# Patient Record
Sex: FEMALE | Race: WHITE | Employment: OTHER | URBAN - METROPOLITAN AREA
[De-identification: names, ages, dates, MRNs, and addresses within clinical notes are randomized per-mention and may not be internally consistent; named-entity substitution may affect disease eponyms.]

---

## 2017-02-08 ENCOUNTER — OFFICE VISIT - HEALTHEAST (OUTPATIENT)
Dept: FAMILY MEDICINE | Facility: CLINIC | Age: 82
End: 2017-02-08

## 2017-02-08 DIAGNOSIS — K56.609 SMALL BOWEL OBSTRUCTION (H): ICD-10-CM

## 2017-02-08 DIAGNOSIS — C50.312 MALIGNANT NEOPLASM OF LOWER-INNER QUADRANT OF LEFT FEMALE BREAST (H): ICD-10-CM

## 2017-02-08 DIAGNOSIS — N63.20 LEFT BREAST LUMP: ICD-10-CM

## 2017-02-08 ASSESSMENT — MIFFLIN-ST. JEOR: SCORE: 1055.45

## 2017-02-15 ENCOUNTER — HOSPITAL ENCOUNTER (OUTPATIENT)
Dept: ULTRASOUND IMAGING | Facility: CLINIC | Age: 82
Discharge: HOME | End: 2017-02-15
Attending: FAMILY MEDICINE

## 2017-02-15 ENCOUNTER — HOSPITAL ENCOUNTER (OUTPATIENT)
Dept: MAMMOGRAPHY | Facility: CLINIC | Age: 82
Discharge: HOME | End: 2017-02-15
Attending: FAMILY MEDICINE

## 2017-02-15 ENCOUNTER — COMMUNICATION - HEALTHEAST (OUTPATIENT)
Dept: TELEHEALTH | Facility: CLINIC | Age: 82
End: 2017-02-15

## 2017-02-15 DIAGNOSIS — N63.20 LEFT BREAST LUMP: ICD-10-CM

## 2017-02-21 ENCOUNTER — COMMUNICATION - HEALTHEAST (OUTPATIENT)
Dept: SCHEDULING | Facility: CLINIC | Age: 82
End: 2017-02-21

## 2017-02-21 DIAGNOSIS — I10 ESSENTIAL HYPERTENSION WITH GOAL BLOOD PRESSURE LESS THAN 140/90: ICD-10-CM

## 2017-02-21 DIAGNOSIS — E78.5 DYSLIPIDEMIA: ICD-10-CM

## 2017-02-24 ENCOUNTER — HOSPITAL ENCOUNTER (OUTPATIENT)
Dept: ULTRASOUND IMAGING | Facility: CLINIC | Age: 82
Discharge: HOME | End: 2017-02-24
Attending: FAMILY MEDICINE

## 2017-02-24 DIAGNOSIS — N63.20 BREAST MASS, LEFT: ICD-10-CM

## 2017-02-27 ENCOUNTER — COMMUNICATION - HEALTHEAST (OUTPATIENT)
Dept: ONCOLOGY | Facility: CLINIC | Age: 82
End: 2017-02-27

## 2017-02-28 ENCOUNTER — COMMUNICATION - HEALTHEAST (OUTPATIENT)
Dept: ONCOLOGY | Facility: CLINIC | Age: 82
End: 2017-02-28

## 2017-03-06 ENCOUNTER — COMMUNICATION - HEALTHEAST (OUTPATIENT)
Dept: FAMILY MEDICINE | Facility: CLINIC | Age: 82
End: 2017-03-06

## 2017-03-06 LAB
LAB AP CHARGES (HE HISTORICAL CONVERSION): ABNORMAL
LAB AP IHC ER/PR REPORT,ADDENDUM (HE HISTORICAL CONVERSION): ABNORMAL
PATH REPORT.ADDENDUM SPEC: ABNORMAL
PATH REPORT.COMMENTS IMP SPEC: ABNORMAL
PATH REPORT.COMMENTS IMP SPEC: ABNORMAL
PATH REPORT.FINAL DX SPEC: ABNORMAL
PATH REPORT.GROSS SPEC: ABNORMAL
PATH REPORT.MICROSCOPIC SPEC OTHER STN: ABNORMAL
PATH REPORT.RELEVANT HX SPEC: ABNORMAL
RESULT FLAG (HE HISTORICAL CONVERSION): ABNORMAL

## 2017-03-07 ENCOUNTER — OFFICE VISIT - HEALTHEAST (OUTPATIENT)
Dept: SURGERY | Facility: CLINIC | Age: 82
End: 2017-03-07

## 2017-03-07 DIAGNOSIS — C50.312 MALIGNANT NEOPLASM OF LOWER-INNER QUADRANT OF LEFT FEMALE BREAST (H): ICD-10-CM

## 2017-03-08 ENCOUNTER — OFFICE VISIT - HEALTHEAST (OUTPATIENT)
Dept: FAMILY MEDICINE | Facility: CLINIC | Age: 82
End: 2017-03-08

## 2017-03-08 ENCOUNTER — AMBULATORY - HEALTHEAST (OUTPATIENT)
Dept: SURGERY | Facility: CLINIC | Age: 82
End: 2017-03-08

## 2017-03-08 DIAGNOSIS — C50.312 MALIGNANT NEOPLASM OF LOWER-INNER QUADRANT OF LEFT FEMALE BREAST (H): ICD-10-CM

## 2017-03-08 DIAGNOSIS — Z01.818 PRE-OP EXAM: ICD-10-CM

## 2017-03-08 LAB
ATRIAL RATE - MUSE: 65 BPM
DIASTOLIC BLOOD PRESSURE - MUSE: NORMAL MMHG
INTERPRETATION ECG - MUSE: NORMAL
P AXIS - MUSE: 24 DEGREES
PR INTERVAL - MUSE: 156 MS
QRS DURATION - MUSE: 82 MS
QT - MUSE: 422 MS
QTC - MUSE: 438 MS
R AXIS - MUSE: -11 DEGREES
SYSTOLIC BLOOD PRESSURE - MUSE: NORMAL MMHG
T AXIS - MUSE: 41 DEGREES
VENTRICULAR RATE- MUSE: 65 BPM

## 2017-03-08 ASSESSMENT — MIFFLIN-ST. JEOR: SCORE: 1075.86

## 2017-03-10 ENCOUNTER — AMBULATORY - HEALTHEAST (OUTPATIENT)
Dept: SURGERY | Facility: CLINIC | Age: 82
End: 2017-03-10

## 2017-03-10 ENCOUNTER — RECORDS - HEALTHEAST (OUTPATIENT)
Dept: ADMINISTRATIVE | Facility: OTHER | Age: 82
End: 2017-03-10

## 2017-03-14 ENCOUNTER — RECORDS - HEALTHEAST (OUTPATIENT)
Dept: ADMINISTRATIVE | Facility: OTHER | Age: 82
End: 2017-03-14

## 2017-03-16 ENCOUNTER — OFFICE VISIT - HEALTHEAST (OUTPATIENT)
Dept: SURGERY | Facility: CLINIC | Age: 82
End: 2017-03-16

## 2017-03-16 ENCOUNTER — COMMUNICATION - HEALTHEAST (OUTPATIENT)
Dept: ONCOLOGY | Facility: CLINIC | Age: 82
End: 2017-03-16

## 2017-03-16 DIAGNOSIS — C50.312 MALIGNANT NEOPLASM OF LOWER-INNER QUADRANT OF LEFT FEMALE BREAST (H): ICD-10-CM

## 2017-03-16 ASSESSMENT — MIFFLIN-ST. JEOR: SCORE: 1094

## 2017-03-17 ENCOUNTER — COMMUNICATION - HEALTHEAST (OUTPATIENT)
Dept: ONCOLOGY | Facility: HOSPITAL | Age: 82
End: 2017-03-17

## 2017-03-27 ENCOUNTER — OFFICE VISIT - HEALTHEAST (OUTPATIENT)
Dept: ONCOLOGY | Facility: CLINIC | Age: 82
End: 2017-03-27

## 2017-03-27 DIAGNOSIS — K56.609 SMALL BOWEL OBSTRUCTION (H): ICD-10-CM

## 2017-03-27 DIAGNOSIS — C50.312 MALIGNANT NEOPLASM OF LOWER-INNER QUADRANT OF LEFT FEMALE BREAST (H): ICD-10-CM

## 2017-03-27 DIAGNOSIS — N83.201 RIGHT OVARIAN CYST: ICD-10-CM

## 2017-03-27 ASSESSMENT — MIFFLIN-ST. JEOR: SCORE: 1089.47

## 2017-03-28 ENCOUNTER — COMMUNICATION - HEALTHEAST (OUTPATIENT)
Dept: ONCOLOGY | Facility: CLINIC | Age: 82
End: 2017-03-28

## 2017-04-03 ENCOUNTER — COMMUNICATION - HEALTHEAST (OUTPATIENT)
Dept: ONCOLOGY | Facility: CLINIC | Age: 82
End: 2017-04-03

## 2017-04-06 ENCOUNTER — COMMUNICATION - HEALTHEAST (OUTPATIENT)
Dept: ONCOLOGY | Facility: CLINIC | Age: 82
End: 2017-04-06

## 2017-04-06 ENCOUNTER — COMMUNICATION - HEALTHEAST (OUTPATIENT)
Dept: ADMINISTRATIVE | Facility: HOSPITAL | Age: 82
End: 2017-04-06

## 2017-04-10 ENCOUNTER — COMMUNICATION - HEALTHEAST (OUTPATIENT)
Dept: ONCOLOGY | Facility: CLINIC | Age: 82
End: 2017-04-10

## 2017-04-13 ENCOUNTER — COMMUNICATION - HEALTHEAST (OUTPATIENT)
Dept: FAMILY MEDICINE | Facility: CLINIC | Age: 82
End: 2017-04-13

## 2017-04-13 DIAGNOSIS — N83.201 RIGHT OVARIAN CYST: ICD-10-CM

## 2017-04-19 ENCOUNTER — COMMUNICATION - HEALTHEAST (OUTPATIENT)
Dept: FAMILY MEDICINE | Facility: CLINIC | Age: 82
End: 2017-04-19

## 2017-05-01 ENCOUNTER — COMMUNICATION - HEALTHEAST (OUTPATIENT)
Dept: ONCOLOGY | Facility: CLINIC | Age: 82
End: 2017-05-01

## 2017-05-11 ENCOUNTER — OFFICE VISIT - HEALTHEAST (OUTPATIENT)
Dept: FAMILY MEDICINE | Facility: CLINIC | Age: 82
End: 2017-05-11

## 2017-05-11 DIAGNOSIS — K56.609 SMALL BOWEL OBSTRUCTION (H): ICD-10-CM

## 2017-05-11 DIAGNOSIS — C50.312 MALIGNANT NEOPLASM OF LOWER-INNER QUADRANT OF LEFT FEMALE BREAST (H): ICD-10-CM

## 2017-05-11 DIAGNOSIS — E78.5 DYSLIPIDEMIA: ICD-10-CM

## 2017-05-11 DIAGNOSIS — E87.6 HYPOKALEMIA: ICD-10-CM

## 2017-05-11 DIAGNOSIS — I10 HYPERTENSION, GOAL BELOW 140/90: ICD-10-CM

## 2017-05-11 DIAGNOSIS — E87.1 HYPONATREMIA: ICD-10-CM

## 2017-05-11 DIAGNOSIS — I10 ESSENTIAL HYPERTENSION WITH GOAL BLOOD PRESSURE LESS THAN 140/90: ICD-10-CM

## 2017-05-11 DIAGNOSIS — E83.42 HYPOMAGNESEMIA: ICD-10-CM

## 2017-05-16 ENCOUNTER — COMMUNICATION - HEALTHEAST (OUTPATIENT)
Dept: ONCOLOGY | Facility: CLINIC | Age: 82
End: 2017-05-16

## 2017-05-22 ENCOUNTER — AMBULATORY - HEALTHEAST (OUTPATIENT)
Dept: ONCOLOGY | Facility: CLINIC | Age: 82
End: 2017-05-22

## 2017-05-23 ENCOUNTER — COMMUNICATION - HEALTHEAST (OUTPATIENT)
Dept: FAMILY MEDICINE | Facility: CLINIC | Age: 82
End: 2017-05-23

## 2017-09-13 ENCOUNTER — RECORDS - HEALTHEAST (OUTPATIENT)
Dept: ADMINISTRATIVE | Facility: OTHER | Age: 82
End: 2017-09-13

## 2017-09-19 ENCOUNTER — COMMUNICATION - HEALTHEAST (OUTPATIENT)
Dept: ONCOLOGY | Facility: CLINIC | Age: 82
End: 2017-09-19

## 2017-09-25 ENCOUNTER — COMMUNICATION - HEALTHEAST (OUTPATIENT)
Dept: ONCOLOGY | Facility: CLINIC | Age: 82
End: 2017-09-25

## 2017-10-10 ENCOUNTER — OFFICE VISIT - HEALTHEAST (OUTPATIENT)
Dept: SURGERY | Facility: CLINIC | Age: 82
End: 2017-10-10

## 2017-10-10 DIAGNOSIS — Z85.3 PERSONAL HISTORY OF BREAST CANCER: ICD-10-CM

## 2017-10-24 ENCOUNTER — COMMUNICATION - HEALTHEAST (OUTPATIENT)
Dept: ONCOLOGY | Facility: CLINIC | Age: 82
End: 2017-10-24

## 2018-06-07 ENCOUNTER — TRANSFERRED RECORDS (OUTPATIENT)
Dept: HEALTH INFORMATION MANAGEMENT | Facility: CLINIC | Age: 83
End: 2018-06-07

## 2018-08-21 ASSESSMENT — ENCOUNTER SYMPTOMS
MEMORY LOSS: 0
EYE REDNESS: 0
ABDOMINAL PAIN: 1
CONSTIPATION: 0
DIFFICULTY URINATING: 0
LIGHT-HEADEDNESS: 0
LEG PAIN: 0
DIZZINESS: 0
PALPITATIONS: 0
RECTAL PAIN: 0
BLOOD IN STOOL: 0
HEMATURIA: 0
MUSCLE WEAKNESS: 1
HYPOTENSION: 1
EYE IRRITATION: 0
WEAKNESS: 0
SINUS CONGESTION: 0
EYE PAIN: 0
HYPERTENSION: 1
BLOATING: 0
PARALYSIS: 0
EXERCISE INTOLERANCE: 0
DISTURBANCES IN COORDINATION: 0
NAUSEA: 0
VOMITING: 0
NUMBNESS: 0
BOWEL INCONTINENCE: 0
SLEEP DISTURBANCES DUE TO BREATHING: 0
SORE THROAT: 0
MYALGIAS: 1
ARTHRALGIAS: 1
NECK PAIN: 0
TREMORS: 0
HEARTBURN: 0
SMELL DISTURBANCE: 0
SINUS PAIN: 0
LOSS OF CONSCIOUSNESS: 0
TASTE DISTURBANCE: 0
DOUBLE VISION: 0
MUSCLE CRAMPS: 0
NECK MASS: 0
HOARSE VOICE: 0
DIARRHEA: 0
SPEECH CHANGE: 0
POOR WOUND HEALING: 1
FLANK PAIN: 0
HEADACHES: 0
TINGLING: 0
NAIL CHANGES: 0
BACK PAIN: 1
SKIN CHANGES: 0
DYSURIA: 0
EYE WATERING: 0
SEIZURES: 0
TROUBLE SWALLOWING: 0
JAUNDICE: 0
ORTHOPNEA: 0
STIFFNESS: 0
SYNCOPE: 0
JOINT SWELLING: 0

## 2018-08-23 PROBLEM — N83.291 COMPLEX CYST OF RIGHT OVARY: Status: ACTIVE | Noted: 2018-07-02

## 2018-08-23 PROBLEM — Z15.89 MONOALLELIC MUTATION OF PMS2 GENE: Status: ACTIVE | Noted: 2018-04-17

## 2018-08-23 PROBLEM — Z17.0 ER+ (ESTROGEN RECEPTOR POSITIVE STATUS): Status: ACTIVE | Noted: 2017-07-10

## 2018-08-23 PROBLEM — Z15.09 MONOALLELIC MUTATION OF PMS2 GENE: Status: ACTIVE | Noted: 2018-04-17

## 2018-08-23 PROBLEM — C50.319 MALIGNANT NEOPLASM OF LOWER-INNER QUADRANT OF FEMALE BREAST (H): Status: ACTIVE | Noted: 2017-02-08

## 2018-08-23 PROBLEM — E78.5 DYSLIPIDEMIA: Status: ACTIVE | Noted: 2017-02-22

## 2018-08-23 RX ORDER — VALSARTAN 80 MG/1
80 TABLET ORAL
COMMUNITY
Start: 2018-04-09 | End: 2018-08-24 | Stop reason: ALTCHOICE

## 2018-08-23 RX ORDER — PRAVASTATIN SODIUM 20 MG
20 TABLET ORAL
COMMUNITY
Start: 2018-03-13 | End: 2021-01-01

## 2018-08-23 RX ORDER — DILTIAZEM HYDROCHLORIDE 300 MG/1
300 CAPSULE, COATED, EXTENDED RELEASE ORAL DAILY
COMMUNITY
Start: 2018-03-13

## 2018-08-23 RX ORDER — METOPROLOL SUCCINATE 50 MG/1
50 TABLET, EXTENDED RELEASE ORAL
COMMUNITY
Start: 2018-03-13 | End: 2018-08-24 | Stop reason: ALTCHOICE

## 2018-08-24 ENCOUNTER — ONCOLOGY VISIT (OUTPATIENT)
Dept: ONCOLOGY | Facility: CLINIC | Age: 83
End: 2018-08-24
Attending: OBSTETRICS & GYNECOLOGY
Payer: MEDICARE

## 2018-08-24 ENCOUNTER — PRE VISIT (OUTPATIENT)
Dept: ONCOLOGY | Facility: CLINIC | Age: 83
End: 2018-08-24

## 2018-08-24 VITALS
BODY MASS INDEX: 27.75 KG/M2 | SYSTOLIC BLOOD PRESSURE: 155 MMHG | TEMPERATURE: 98.2 F | WEIGHT: 156.6 LBS | RESPIRATION RATE: 16 BRPM | HEART RATE: 70 BPM | DIASTOLIC BLOOD PRESSURE: 72 MMHG | HEIGHT: 63 IN

## 2018-08-24 DIAGNOSIS — N83.291 COMPLEX CYST OF RIGHT OVARY: Primary | ICD-10-CM

## 2018-08-24 PROCEDURE — G0463 HOSPITAL OUTPT CLINIC VISIT: HCPCS | Mod: ZF

## 2018-08-24 PROCEDURE — 99205 OFFICE O/P NEW HI 60 MIN: CPT | Mod: ZP | Performed by: OBSTETRICS & GYNECOLOGY

## 2018-08-24 RX ORDER — ATENOLOL 50 MG/1
50 TABLET ORAL
COMMUNITY
Start: 2018-08-10 | End: 2021-01-01

## 2018-08-24 RX ORDER — OLMESARTAN MEDOXOMIL 20 MG/1
20 TABLET ORAL
COMMUNITY
Start: 2018-08-10 | End: 2021-01-01 | Stop reason: ALTCHOICE

## 2018-08-24 RX ORDER — DIPHENOXYLATE HYDROCHLORIDE AND ATROPINE SULFATE 2.5; .025 MG/1; MG/1
1 TABLET ORAL
COMMUNITY
End: 2021-01-01

## 2018-08-24 ASSESSMENT — PAIN SCALES - GENERAL: PAINLEVEL: MILD PAIN (3)

## 2018-08-24 NOTE — PROGRESS NOTES
Consult Notes on Referred Patient    Date: 2018       Dr. Loly INTERIANO Yossi  [facility]  [address] [place] [address]       RE: Angelica Toro  : 10/2/1929  LENCHO: 2018    Dear Dr. Loly INTERIANO Yossi:    I had the pleasure of seeing your patient Angelica Toro here at the Gynecologic Cancer Clinic at the [facility] on 2018.  As you know she is a very pleasant 88 year old woman with a known complex right adnexal mass that has been stable over 5yrs (9596-7766), but recently has increased in size and she is now having daily pelvic pain.  Given these findings she was subsequently sent to the Gynecologic Cancer Clinic for new patient consultation.     She reports 3 years of intermittent RLQ pain that can last up to 6mo at a time but can be a few days. It seems to be getting worse over time and rates up 8/10 pain at times. It is worse when sweeping but walking sometimes makes it better and sometimes worse. She doesn't notice any correlation with any other activities. She currently rates it 3/10. She does not feel like it limits her activities currently. She denies changes in bowel or bladder function. Does have stress urinary incontinence but this is unchanged over many years. Has regular BM 1-2/day.    H/o Illness:  Brief history of disease:  2010: US  Uterus measures: 7.6 x 6.3 x 3.5 cm.  Endometrial stripe measures: Obscured by the presence of a mass. Ill-defined borders of an area of mixed echogenicity partly within the myometrium and extending into what appears to be the endometrium measuring 4.7 x 3.7 x 3.1 cm. While this could represent a submucosal fibroid the margins are somewhat ill-defined and other etiologies are not excluded. The second 3.9 cm hypoechoic mass at the uterine fundus most compatible with a fibroid. Free fluid: None. Right ovary: 4.7 x 3.1 x 3.7 cm. Multiple anechoic areas ranging in size from 3 cm to 1.5 cm compatible with multiple  cyst. Left ovary: 1.5 x 1.3 x 0.9 cm.     6/2010: CA-125 8, AFP 2.3, inhibin A: < 1.0, beta hcg 2, CEA 1.2. Endometrial biopsy showed endocervical polyp with fragmented benign endocervical tissue.    8/2010 US:  No interval change in the size of the right ovary cystic lesion. Measures 4.3 x 3.4 x 3.0 cm. Again seen is the septated cystic lesion involving the right ovary. The larger cystic components are not significantly changed since the prior study measuring 3.2 x 3.2 x 2.2 cm, 1.7 x 1.5 x 1.6 cm and 2.1 x 1.8 x 1.9 cm. No definite Doppler flow is demonstrated within the septations. The left ovary is not visualized.     Elected for conservative management until 4/2015 when she had increasing right groin pain. Thought it was secondary to a cardiac endovascular stent that was placed in 2010 through the same area.     6/1/15: CT Abdomen/Pelvis: Right adnexal mass slightly enlarged 3.8 x 4.1 x 6.3cm, known myomatous uterus.     6/12/15: CA-125 7.    6/16/15: Pelvic US: right adnexa 5.3 x 4.4 x 3.6cm with multiple contiguous cysts vs a multilocular cyst.      7/13/15: Referral to Dr Camejo. Asymptomatic from cyst. Elected for observation over surgery given age and h/o MI and stent, with repeat exam in 1yr with general OB/Gyn.    6/7/18: Pelvis US - Normal ovarian tissue is not seen, however there is a septated cyst within the right adnexa measuring 6.3 x 4.8 x 4.7 cm (4.7 x 4.0 x 4.9 cm on a previous outside CT). This has thin internal septation with no calcification. There is likely normal ovarian tissue surrounding the periphery, however this is not for certain.    7/2/18: Saw generalist OB Gyn and expressed desire to discuss again with GynOnc given new daily symptoms.      Review of Systems:  Answers for HPI/ROS submitted by the patient on 8/21/2018   General Symptoms: No  Skin Symptoms: Yes  HENT Symptoms: Yes  EYE SYMPTOMS: Yes  HEART SYMPTOMS: Yes  LUNG SYMPTOMS: No  INTESTINAL SYMPTOMS: Yes  URINARY SYMPTOMS:  Yes  GYNECOLOGIC SYMPTOMS: No  BREAST SYMPTOMS: No  SKELETAL SYMPTOMS: Yes  BLOOD SYMPTOMS: No  NERVOUS SYSTEM SYMPTOMS: Yes  MENTAL HEALTH SYMPTOMS: No  Changes in hair: No  Changes in moles/birth marks: No  Itching: Yes  Rashes: No  Changes in nails: No  Acne: No  Hair in places you don't want it: No  Change in facial hair: No  Warts: No  Non-healing sores: Yes  Scarring: No  Flaking of skin: Yes  Color changes of hands/feet in cold : No  Sun sensitivity: No  Skin thickening: No  Ear pain: No  Ear discharge: No  Hearing loss: No  Tinnitus: No  Nosebleeds: No  Congestion: No  Sinus pain: No  Trouble swallowing: No   Voice hoarseness: No  Mouth sores: No  Sore throat: No  Tooth pain: No  Gum tenderness: Yes  Bleeding gums: Yes  Change in taste: No  Change in sense of smell: No  Dry mouth: No  Hearing aid used: No  Neck lump: No  Eye pain: No  Vision loss: No  Dry eyes: No  Watery eyes: No  Eye bulging: No  Double vision: No  Flashing of lights: No  Spots: No  Floaters: Yes  Redness: No  Crossed eyes: No  Tunnel Vision: No  Yellowing of eyes: No  Eye irritation: No  Chest pain or pressure: No  Fast or irregular heartbeat: No  Pain in legs with walking: No  Trouble breathing while lying down: No  Fingers or toes appear blue: No  High blood pressure: Yes  Low blood pressure: Yes  Fainting: No  Murmurs: No  Pacemaker: No  Varicose veins: Yes  Edema or swelling: No  Wake up at night with shortness of breath: No  Light-headedness: No  Exercise intolerance: No  Heart burn or indigestion: No  Nausea: No  Vomiting: No  Abdominal pain: Yes  Bloating: No  Constipation: No  Diarrhea: No  Blood in stool: No  Black stools: No  Rectal or Anal pain: No  Fecal incontinence: No  Yellowing of skin or eyes: No  Vomit with blood: No  Change in stools: No  Trouble holding urine or incontinence: Yes  Pain or burning: No  Trouble starting or stopping: No  Increased frequency of urination: No  Blood in urine: No  Decreased frequency of  urination: No  Frequent nighttime urination: No  Flank pain: No  Difficulty emptying bladder: No  Back pain: Yes  Muscle aches: Yes  Neck pain: No  Swollen joints: No  Joint pain: Yes  Bone pain: No  Muscle cramps: No  Muscle weakness: Yes  Joint stiffness: No  Bone fracture: No  Trouble with coordination: No  Dizziness or trouble with balance: No  Fainting or black-out spells: No  Memory loss: No  Headache: No  Seizures: No  Speech problems: No  Tingling: No  Tremor: No  Weakness: No  Difficulty walking: No  Paralysis: No  Numbness: No    Past Medical History:  Past Medical History:   Diagnosis Date     Breast cancer (H)     left, s/p lumpectomy     CAD (coronary artery disease)     MI 2010 s/p stent     Diastolic dysfunction      Diverticulosis      HLD (hyperlipidemia)      HTN (hypertension)      Hx SBO      Right ovarian cyst      STEMI (ST elevation myocardial infarction) (H) 10/28/2010    inferior     Urinary incontinence        Past Surgical History:  Past Surgical History:   Procedure Laterality Date     APPENDECTOMY       AS CORNEAL TRANSPLANT,LAMELLAR       CHOLECYSTECTOMY       FRACTURE TX, ANKLE RT/LT       LIGATE VEIN       LUMPECTOMY BREAST Left      VA PATIENT HAS A CORONARY ARTERY STENT  2010      BREAST BIOPSY LT         Health Maintenance:  Health Maintenance Due   Topic Date Due     PHQ-2 Q1 YR  10/02/1941     TETANUS IMMUNIZATION (SYSTEM ASSIGNED)  10/02/1947     FALL RISK ASSESSMENT  10/02/1994     PNEUMOCOCCAL (1 of 2 - PCV13) 10/02/1994     ADVANCE DIRECTIVE PLANNING Q5 YRS  08/11/2016       Last Pap Smear: NO h/o abnormal, last a few years ago    Last Mammogram: 5/22/18              Result: BIRADS 2 benign      She has had a history of abnormal mammograms - breast cancer s/p lumpectomy in 2017, did not tolerate anastrazole.    Last Colonoscopy: 10 years ago, different doctors have told her she does and doesn't need one, will discuss further with PCP                    Last LAVERNE  "Scan: none    Last Diabetes Screening; none    Last Thyroid Screening:     none     Last Cholest Screening:    none       Current Medications:   has a current medication list which includes the following prescription(s): aspirin, atenolol, diltiazem, ferrous gluconate, multi-vitamins, pravastatin, and olmesartan.     Allergies:   Allergies   Allergen Reactions     Amlodipine Cough, Swelling and Other (See Comments)     PN: swollen tongue, irritated throat     Anastrozole Palpitations     High b/p, headache     Lisinopril Anaphylaxis, Shortness Of Breath and Other (See Comments)     Losartan Swelling     PN: severe leg swelling     Sulfa Drugs Swelling and Anaphylaxis     Throat swelling     Thiazide-Type Diuretics Other (See Comments)     Shakes and chills.  Shakes and chills.     Coffee Bean Extract  [Coffea Arabica]      Other reaction(s): Other (See Comments)  PN: decaff coffee     Furosemide Swelling     Hydrochlorothiazide Swelling     Metolazone Other (See Comments)     PN: Trouble breathing and tremors     No Clinical Screening - See Comments Other (See Comments)     PN: decaff coffee       Social History:   Social History   Substance Use Topics     Smoking status: Never Smoker     Smokeless tobacco: Never Used     Alcohol use 0.6 oz/week     1 Cans of beer per week      Comment: 3 times a year       History   Drug Use No     Family History:   No family h/o cancers    Physical Exam:     /72 (BP Location: Right arm, Patient Position: Sitting, Cuff Size: Adult Regular)  Pulse 70  Temp 98.2  F (36.8  C) (Tympanic)  Resp 16  Ht 1.61 m (5' 3.39\")  Wt 71 kg (156 lb 9.6 oz)  BMI 27.4 kg/m2  Body mass index is 27.4 kg/(m^2).    General Appearance: healthy and alert, no distress     HEENT:  no thyromegaly, no palpable nodules or masses        Cardiovascular: regular rate and rhythm, no gallops, rubs or murmurs     Respiratory: lungs clear, no rales, rhonchi or wheezes, normal diaphragmatic " excursion    Musculoskeletal: extremities non tender and without edema    Skin: no lesions or rashes     Neurological: normal gait, no gross defects     Psychiatric: appropriate mood and affect                               Hematological: normal cervical, supraclavicular and inguinal lymph nodes     Gastrointestinal:       abdomen soft, non-tender, non-distended, no organomegaly or masses    Genitourinary: External genitalia and urethral meatus appears normal.  Vagina is smooth without nodularity or masses, atrophic. Cervix appears normal with small areas of abrasion/atrophy and nabothian cyst x2.  Bimanual exam reveal fullness of right adnexa, mobile, no nodularity noted.  Recto-vaginal exam confirms these findings.      Assessment:    Angelica Toro is a 88 year old woman with known right adnexal complex cyst that has increased in size from 2015, though still not worrisome for cancer, is causing daily pain    A total of 60 minutes was spent with the patient, 40 minutes of which were spent in counseling the patient and/or treatment planning.      Plan:     1.)  Discussed risks and benefits of surgery vs observation again. Given increase in symptoms and we are now further removed from her MI and stent in 2010 with stable cardiac function, surgery is a reasonable option. Discussed that this could be done laparoscopically and would recommend she stay overnight for observation given her medical history though if she was highly inclined to go home the same day and was doing well she could. She would like to consider the options of observation vs surgical management and will contact us if she does decide to proceed with surgery.     2.) Genetic risk factors were assessed and the patient does not meet the qualifications for a referral.      3.) Labs and/or tests ordered include: none.     4.) Health maintenance issues addressed today include none.      Thank you for allowing us to participate in the care of your patient.          Sincerely,    Juanjose Camejo    I have examined this patient with our resident who acted as as scribe and agree with the above findings and plan.    Juanjose Camejo I, Keli Patel MD PGY3, acted as scribe for Dr. Camejo.    CC  Patient Care Team:  Loly Means as PCP - General (OB/Gyn)  LOLY MEANS

## 2018-08-24 NOTE — MR AVS SNAPSHOT
"              After Visit Summary   8/24/2018    Angelica Toro    MRN: [id number]           Patient Information     Date Of Birth          10/2/1929        Visit Information        Provider Department      8/24/2018 9:30 AM Juanjose Camejo MD [facility]        Today's Diagnoses     Complex cyst of right ovary    -  1       Follow-ups after your visit        Who to contact     If you have questions or need follow up information about today's clinic visit or your schedule please contact [facility] directly at [phone].  Normal or non-critical lab and imaging results will be communicated to you by [employer], letter or phone within 4 business days after the clinic has received the results. If you do not hear from us within 7 days, please contact the clinic through [employer] or phone. If you have a critical or abnormal lab result, we will notify you by phone as soon as possible.  Submit refill requests through [employer] or call your pharmacy and they will forward the refill request to us. Please allow 3 business days for your refill to be completed.          Additional Information About Your Visit        MyChart Information     [employer] gives you secure access to your electronic health record. If you see a primary care provider, you can also send messages to your care team and make appointments. If you have questions, please call your primary care clinic.  If you do not have a primary care provider, please call [phone] and they will assist you.        Care EveryWhere ID     This is your Care EveryWhere ID. This could be used by other organizations to access your [facility] medical records  [id number]        Your Vitals Were     Pulse Temperature Respirations Height BMI (Body Mass Index)       70 98.2  F (36.8  C) (Tympanic) 16 1.61 m (5' 3.39\") 27.4 kg/m2        Blood Pressure from Last 3 Encounters:   08/24/18 155/72    Weight from Last 3 Encounters:   08/24/18 71 kg " (156 lb 9.6 oz)              Today, you had the following     No orders found for display         Today's Medication Changes          These changes are accurate as of 8/24/18 12:48 PM.  If you have any questions, ask your nurse or doctor.               Stop taking these medicines if you haven't already. Please contact your care team if you have questions.     metoprolol succinate 50 MG 24 hr tablet   Commonly known as:  TOPROL-XL   Stopped by:  Juanjose Camejo MD           valsartan 80 MG tablet   Commonly known as:  DIOVAN   Stopped by:  Juanjose Camejo MD                    Primary Care Provider Office Phone # Fax #    Loly INTERIANO Yossi [phone]       [place] [address] [facility] [address]        Equal Access to Services     [facility] : Blanca chao Soeleanor, waaxda luqadaha, qaybta kaalmada adeegyada, waxay phuc mendoza . So [facility] [phone].    ATENCIÓN: Si habla español, tiene a coleman disposición servicios gratuitos de asistencia lingüística. [facility] [phone].    We comply with applicable federal civil rights laws and [place] laws. We do not discriminate on the basis of race, color, national origin, age, disability, sex, sexual orientation, or gender identity.            Thank you!     Thank you for choosing [facility]  for your care. Our goal is always to provide you with excellent care. Hearing back from our patients is one way we can continue to improve our services. Please take a few minutes to complete the written survey that you may receive in the mail after your visit with us. Thank you!             Your Updated Medication List - Protect others around you: Learn how to safely use, store and throw away your medicines at www.disposemymeds.org.          This list is accurate as of 8/24/18 12:48 PM.  Always use your most recent med list.                   Brand Name Dispense Instructions for use  Diagnosis    aspirin 81 MG EC tablet      Take 81 mg by mouth        atenolol 50 MG tablet    TENORMIN     Take 50 mg by mouth        diltiazem 300 MG 24 hr capsule      Take 300 mg by mouth        Ferrous Gluconate 225 (27 Fe) MG Tabs      225 mg        MULTI-VITAMINS Tabs      Take 1 tablet by mouth        olmesartan 20 MG tablet    BENICAR     Take 20 mg by mouth        pravastatin 20 MG tablet    PRAVACHOL     Take 20 mg by mouth

## 2018-08-24 NOTE — NURSING NOTE
"Oncology Rooming Note    August 24, 2018 9:01 AM   Angelica Toro is a 88 year old female who presents for:    Chief Complaint   Patient presents with     Oncology Clinic Visit     New patient visit related to Enlarged Ovaries     Initial Vitals: /72 (BP Location: Right arm, Patient Position: Sitting, Cuff Size: Adult Regular)  Pulse 70  Temp 98.2  F (36.8  C) (Tympanic)  Resp 16  Ht 1.61 m (5' 3.39\")  Wt 71 kg (156 lb 9.6 oz)  BMI 27.4 kg/m2 Estimated body mass index is 27.4 kg/(m^2) as calculated from the following:    Height as of this encounter: 1.61 m (5' 3.39\").    Weight as of this encounter: 71 kg (156 lb 9.6 oz). Body surface area is 1.78 meters squared.  Mild Pain (3) Comment: Lower right pelvic area   No LMP recorded. Patient is postmenopausal.  Allergies reviewed: Yes  Medications reviewed: Yes    Medications: Medication refills not needed today.  Pharmacy name entered into [facility]: [facility] [place], [place] - [address] [place] [address] [phone]    Clinical concerns: No new concerns. Provider was notified.    10 minutes for nursing intake (face to face time)     Karma Celaya LPN            "

## 2018-08-24 NOTE — LETTER
2018       RE: Angelica Toro  [address] [place] [address]     Dear Colleague,    Thank you for referring your patient, Angelica Toro, to the [facility]. Please see a copy of my visit note below.                            Consult Notes on Referred Patient    Date: 2018       Dr. Loly Sy  [facility]  [address] [place] [address]       RE: Angelica Toro  : 10/2/1929  LENCHO: 2018    Dear Dr. Loly INTERIANO Yossi:    I had the pleasure of seeing your patient Angelica Toro here at the Gynecologic Cancer Clinic at the [facility] on 2018.  As you know she is a very pleasant 88 year old woman with a known complex right adnexal mass that has been stable over 5yrs ([id number]), but recently has increased in size and she is now having daily pelvic pain.  Given these findings she was subsequently sent to the Gynecologic Cancer Clinic for new patient consultation.     She reports 3 years of intermittent RLQ pain that can last up to 6mo at a time but can be a few days. It seems to be getting worse over time and rates up 8/10 pain at times. It is worse when sweeping but walking sometimes makes it better and sometimes worse. She doesn't notice any correlation with any other activities. She currently rates it 3/10. She does not feel like it limits her activities currently. She denies changes in bowel or bladder function. Does have stress urinary incontinence but this is unchanged over many years. Has regular BM 1-2/day.    H/o Illness:  Brief history of disease:  2010: US  Uterus measures: 7.6 x 6.3 x 3.5 cm.  Endometrial stripe measures: Obscured by the presence of a mass. Ill-defined borders of an area of mixed echogenicity partly within the myometrium and extending into what appears to be the endometrium measuring 4.7 x 3.7 x 3.1 cm. While this could represent a submucosal fibroid the margins are somewhat ill-defined and other etiologies are not  excluded. The second 3.9 cm hypoechoic mass at the uterine fundus most compatible with a fibroid. Free fluid: None. Right ovary: 4.7 x 3.1 x 3.7 cm. Multiple anechoic areas ranging in size from 3 cm to 1.5 cm compatible with multiple cyst. Left ovary: 1.5 x 1.3 x 0.9 cm.     6/2010: CA-125 8, AFP 2.3, inhibin A: < 1.0, beta hcg 2, CEA 1.2. Endometrial biopsy showed endocervical polyp with fragmented benign endocervical tissue.    8/2010 US:  No interval change in the size of the right ovary cystic lesion. Measures 4.3 x 3.4 x 3.0 cm. Again seen is the septated cystic lesion involving the right ovary. The larger cystic components are not significantly changed since the prior study measuring 3.2 x 3.2 x 2.2 cm, 1.7 x 1.5 x 1.6 cm and 2.1 x 1.8 x 1.9 cm. No definite Doppler flow is demonstrated within the septations. The left ovary is not visualized.     Elected for conservative management until 4/2015 when she had increasing right groin pain. Thought it was secondary to a cardiac endovascular stent that was placed in 2010 through the same area.     6/1/15: CT Abdomen/Pelvis: Right adnexal mass slightly enlarged 3.8 x 4.1 x 6.3cm, known myomatous uterus.     6/12/15: CA-125 7.    6/16/15: Pelvic US: right adnexa 5.3 x 4.4 x 3.6cm with multiple contiguous cysts vs a multilocular cyst.      7/13/15: Referral to Dr Camejo. Asymptomatic from cyst. Elected for observation over surgery given age and h/o MI and stent, with repeat exam in 1yr with general OB/Gyn.    6/7/18: Pelvis US - Normal ovarian tissue is not seen, however there is a septated cyst within the right adnexa measuring 6.3 x 4.8 x 4.7 cm (4.7 x 4.0 x 4.9 cm on a previous outside CT). This has thin internal septation with no calcification. There is likely normal ovarian tissue surrounding the periphery, however this is not for certain.    7/2/18: Saw generalist OB Gyn and expressed desire to discuss again with GynOnc given new daily symptoms.      Review of  Systems:  Answers for HPI/ROS submitted by the patient on 8/21/2018   General Symptoms: No  Skin Symptoms: Yes  HENT Symptoms: Yes  EYE SYMPTOMS: Yes  HEART SYMPTOMS: Yes  LUNG SYMPTOMS: No  INTESTINAL SYMPTOMS: Yes  URINARY SYMPTOMS: Yes  GYNECOLOGIC SYMPTOMS: No  BREAST SYMPTOMS: No  SKELETAL SYMPTOMS: Yes  BLOOD SYMPTOMS: No  NERVOUS SYSTEM SYMPTOMS: Yes  MENTAL HEALTH SYMPTOMS: No  Changes in hair: No  Changes in moles/birth marks: No  Itching: Yes  Rashes: No  Changes in nails: No  Acne: No  Hair in places you don't want it: No  Change in facial hair: No  Warts: No  Non-healing sores: Yes  Scarring: No  Flaking of skin: Yes  Color changes of hands/feet in cold : No  Sun sensitivity: No  Skin thickening: No  Ear pain: No  Ear discharge: No  Hearing loss: No  Tinnitus: No  Nosebleeds: No  Congestion: No  Sinus pain: No  Trouble swallowing: No   Voice hoarseness: No  Mouth sores: No  Sore throat: No  Tooth pain: No  Gum tenderness: Yes  Bleeding gums: Yes  Change in taste: No  Change in sense of smell: No  Dry mouth: No  Hearing aid used: No  Neck lump: No  Eye pain: No  Vision loss: No  Dry eyes: No  Watery eyes: No  Eye bulging: No  Double vision: No  Flashing of lights: No  Spots: No  Floaters: Yes  Redness: No  Crossed eyes: No  Tunnel Vision: No  Yellowing of eyes: No  Eye irritation: No  Chest pain or pressure: No  Fast or irregular heartbeat: No  Pain in legs with walking: No  Trouble breathing while lying down: No  Fingers or toes appear blue: No  High blood pressure: Yes  Low blood pressure: Yes  Fainting: No  Murmurs: No  Pacemaker: No  Varicose veins: Yes  Edema or swelling: No  Wake up at night with shortness of breath: No  Light-headedness: No  Exercise intolerance: No  Heart burn or indigestion: No  Nausea: No  Vomiting: No  Abdominal pain: Yes  Bloating: No  Constipation: No  Diarrhea: No  Blood in stool: No  Black stools: No  Rectal or Anal pain: No  Fecal incontinence: No  Yellowing of skin or  eyes: No  Vomit with blood: No  Change in stools: No  Trouble holding urine or incontinence: Yes  Pain or burning: No  Trouble starting or stopping: No  Increased frequency of urination: No  Blood in urine: No  Decreased frequency of urination: No  Frequent nighttime urination: No  Flank pain: No  Difficulty emptying bladder: No  Back pain: Yes  Muscle aches: Yes  Neck pain: No  Swollen joints: No  Joint pain: Yes  Bone pain: No  Muscle cramps: No  Muscle weakness: Yes  Joint stiffness: No  Bone fracture: No  Trouble with coordination: No  Dizziness or trouble with balance: No  Fainting or black-out spells: No  Memory loss: No  Headache: No  Seizures: No  Speech problems: No  Tingling: No  Tremor: No  Weakness: No  Difficulty walking: No  Paralysis: No  Numbness: No    Past Medical History:  Past Medical History:   Diagnosis Date     Breast cancer (H)     left, s/p lumpectomy     CAD (coronary artery disease)     MI 2010 s/p stent     Diastolic dysfunction      Diverticulosis      HLD (hyperlipidemia)      HTN (hypertension)      Hx SBO      Right ovarian cyst      STEMI (ST elevation myocardial infarction) (H) 10/28/2010    inferior     Urinary incontinence        Past Surgical History:  Past Surgical History:   Procedure Laterality Date     APPENDECTOMY       AS CORNEAL TRANSPLANT,LAMELLAR       CHOLECYSTECTOMY       FRACTURE TX, ANKLE RT/LT       LIGATE VEIN       LUMPECTOMY BREAST Left      [place] PATIENT HAS A CORONARY ARTERY STENT  2010     US BREAST BIOPSY LT         Health Maintenance:  Health Maintenance Due   Topic Date Due     PHQ-2 Q1 YR  10/02/1941     TETANUS IMMUNIZATION (SYSTEM ASSIGNED)  10/02/1947     FALL RISK ASSESSMENT  10/02/1994     PNEUMOCOCCAL (1 of 2 - PCV13) 10/02/1994     ADVANCE DIRECTIVE PLANNING Q5 YRS  08/11/2016       Last Pap Smear: NO h/o abnormal, last a few years ago    Last Mammogram: 5/22/18              Result: BIRADS 2 benign      She has had a history of abnormal mammograms -  "breast cancer s/p lumpectomy in 2017, did not tolerate anastrazole.    Last Colonoscopy: 10 years ago, different doctors have told her she does and doesn't need one, will discuss further with PCP                    Last DEXA Scan: none    Last Diabetes Screening; none    Last Thyroid Screening:     none     Last Cholest Screening:    none       Current Medications:   has a current medication list which includes the following prescription(s): aspirin, atenolol, diltiazem, ferrous gluconate, multi-vitamins, pravastatin, and olmesartan.     Allergies:   Allergies   Allergen Reactions     Amlodipine Cough, Swelling and Other (See Comments)     PN: swollen tongue, irritated throat     Anastrozole Palpitations     High b/p, headache     Lisinopril Anaphylaxis, Shortness Of Breath and Other (See Comments)     Losartan Swelling     PN: severe leg swelling     Sulfa Drugs Swelling and Anaphylaxis     Throat swelling     Thiazide-Type Diuretics Other (See Comments)     Shakes and chills.  Shakes and chills.     Coffee Bean Extract  [Coffea Arabica]      Other reaction(s): Other (See Comments)  PN: decaff coffee     Furosemide Swelling     Hydrochlorothiazide Swelling     Metolazone Other (See Comments)     PN: Trouble breathing and tremors     No Clinical Screening - See Comments Other (See Comments)     PN: decaff coffee       Social History:   Social History   Substance Use Topics     Smoking status: Never Smoker     Smokeless tobacco: Never Used     Alcohol use 0.6 oz/week     1 Cans of beer per week      Comment: 3 times a year       History   Drug Use No     Family History:   No family h/o cancers    Physical Exam:     /72 (BP Location: Right arm, Patient Position: Sitting, Cuff Size: Adult Regular)  Pulse 70  Temp 98.2  F (36.8  C) (Tympanic)  Resp 16  Ht 1.61 m (5' 3.39\")  Wt 71 kg (156 lb 9.6 oz)  BMI 27.4 kg/m2  Body mass index is 27.4 kg/(m^2).    General Appearance: healthy and alert, no distress   "   HEENT:  no thyromegaly, no palpable nodules or masses        Cardiovascular: regular rate and rhythm, no gallops, rubs or murmurs     Respiratory: lungs clear, no rales, rhonchi or wheezes, normal diaphragmatic excursion    Musculoskeletal: extremities non tender and without edema    Skin: no lesions or rashes     Neurological: normal gait, no gross defects     Psychiatric: appropriate mood and affect                               Hematological: normal cervical, supraclavicular and inguinal lymph nodes     Gastrointestinal:       abdomen soft, non-tender, non-distended, no organomegaly or masses    Genitourinary: External genitalia and urethral meatus appears normal.  Vagina is smooth without nodularity or masses, atrophic. Cervix appears normal with small areas of abrasion/atrophy and nabothian cyst x2.  Bimanual exam reveal fullness of right adnexa, mobile, no nodularity noted.  Recto-vaginal exam confirms these findings.      Assessment:    Angelica Toro is a 88 year old woman with known right adnexal complex cyst that has increased in size from 2015, though still not worrisome for cancer, is causing daily pain    A total of 60 minutes was spent with the patient, 40 minutes of which were spent in counseling the patient and/or treatment planning.      Plan:     1.)  Discussed risks and benefits of surgery vs observation again. Given increase in symptoms and we are now further removed from her MI and stent in 2010 with stable cardiac function, surgery is a reasonable option. Discussed that this could be done laparoscopically and would recommend she stay overnight for observation given her medical history though if she was highly inclined to go home the same day and was doing well she could. She would like to consider the options of observation vs surgical management and will contact us if she does decide to proceed with surgery.     2.) Genetic risk factors were assessed and the patient does not meet the  qualifications for a referral.      3.) Labs and/or tests ordered include: none.     4.) Health maintenance issues addressed today include none.      Thank you for allowing us to participate in the care of your patient.         Sincerely,    Juanjose Camejo    I have examined this patient with our resident who acted as as scribe and agree with the above findings and plan.    Juanjose Camejo I, Keli Patel MD PGY3, acted as scribe for Dr. Camejo.    CC  Patient Care Team:  Loly Sy as PCP - General (OB/Gyn)

## 2019-09-30 ENCOUNTER — HEALTH MAINTENANCE LETTER (OUTPATIENT)
Age: 84
End: 2019-09-30

## 2019-12-15 ENCOUNTER — HEALTH MAINTENANCE LETTER (OUTPATIENT)
Age: 84
End: 2019-12-15

## 2020-01-01 ENCOUNTER — TELEPHONE (OUTPATIENT)
Dept: ONCOLOGY | Facility: CLINIC | Age: 85
End: 2020-01-01

## 2020-11-24 NOTE — TELEPHONE ENCOUNTER
Returning Angelica's phone call, sounds like she had a repeat CT scan through [facility] that shows her right ovarian cyst from 2018 has increased in size now measuring 8 cm and she has 4.6 cm cyst on her left ovary.  Patient having intermittent discomfort and constipation along with more urinary incontinence.  She would like a follow up with Dr. Camejo when her daughters would be around to help her after middle of Jan 2021, so have her set up 2/1/2021.

## 2021-01-01 ENCOUNTER — HOSPITAL ENCOUNTER (INPATIENT)
Dept: CARDIOLOGY | Facility: CLINIC | Age: 86
DRG: 981 | End: 2021-08-07
Attending: INTERNAL MEDICINE
Payer: MEDICARE

## 2021-01-01 ENCOUNTER — APPOINTMENT (OUTPATIENT)
Dept: OCCUPATIONAL THERAPY | Facility: CLINIC | Age: 86
DRG: 981 | End: 2021-01-01
Payer: MEDICARE

## 2021-01-01 ENCOUNTER — AMBULATORY - HEALTHEAST (OUTPATIENT)
Dept: OTHER | Facility: CLINIC | Age: 86
End: 2021-01-01

## 2021-01-01 ENCOUNTER — APPOINTMENT (OUTPATIENT)
Dept: CT IMAGING | Facility: CLINIC | Age: 86
DRG: 981 | End: 2021-01-01
Attending: FAMILY MEDICINE
Payer: MEDICARE

## 2021-01-01 ENCOUNTER — HOSPITAL ENCOUNTER (INPATIENT)
Facility: HOSPICE | Age: 86
LOS: 6 days | End: 2021-10-18

## 2021-01-01 ENCOUNTER — LAB REQUISITION (OUTPATIENT)
Dept: LAB | Facility: CLINIC | Age: 86
End: 2021-01-01
Payer: MEDICARE

## 2021-01-01 ENCOUNTER — APPOINTMENT (OUTPATIENT)
Dept: CT IMAGING | Facility: CLINIC | Age: 86
DRG: 981 | End: 2021-01-01
Attending: HOSPITALIST
Payer: MEDICARE

## 2021-01-01 ENCOUNTER — APPOINTMENT (OUTPATIENT)
Dept: PHYSICAL THERAPY | Facility: CLINIC | Age: 86
DRG: 981 | End: 2021-01-01
Payer: MEDICARE

## 2021-01-01 ENCOUNTER — HOSPITAL ENCOUNTER (OUTPATIENT)
Facility: CLINIC | Age: 86
Discharge: HOME | End: 2021-03-27
Attending: OBSTETRICS & GYNECOLOGY | Admitting: OBSTETRICS & GYNECOLOGY
Payer: MEDICARE

## 2021-01-01 ENCOUNTER — APPOINTMENT (OUTPATIENT)
Dept: OCCUPATIONAL THERAPY | Facility: CLINIC | Age: 86
DRG: 981 | End: 2021-01-01
Attending: HOSPITALIST
Payer: MEDICARE

## 2021-01-01 ENCOUNTER — ONCOLOGY VISIT (OUTPATIENT)
Dept: ONCOLOGY | Facility: CLINIC | Age: 86
End: 2021-01-01
Attending: OBSTETRICS & GYNECOLOGY
Payer: MEDICARE

## 2021-01-01 ENCOUNTER — APPOINTMENT (OUTPATIENT)
Dept: RADIOLOGY | Facility: CLINIC | Age: 86
DRG: 981 | End: 2021-01-01
Attending: SURGERY
Payer: MEDICARE

## 2021-01-01 ENCOUNTER — HEALTH MAINTENANCE LETTER (OUTPATIENT)
Age: 86
End: 2021-01-01

## 2021-01-01 ENCOUNTER — MEDICAL CORRESPONDENCE (OUTPATIENT)
Dept: HEALTH INFORMATION MANAGEMENT | Facility: CLINIC | Age: 86
End: 2021-01-01

## 2021-01-01 ENCOUNTER — ANESTHESIA (OUTPATIENT)
Dept: SURGERY | Facility: CLINIC | Age: 86
End: 2021-01-01
Payer: MEDICARE

## 2021-01-01 ENCOUNTER — DOCUMENTATION ONLY (OUTPATIENT)
Dept: ONCOLOGY | Facility: CLINIC | Age: 86
End: 2021-01-01

## 2021-01-01 ENCOUNTER — APPOINTMENT (OUTPATIENT)
Dept: CT IMAGING | Facility: CLINIC | Age: 86
DRG: 981 | End: 2021-01-01
Attending: SPECIALIST
Payer: MEDICARE

## 2021-01-01 ENCOUNTER — DOCUMENTATION ONLY (OUTPATIENT)
Dept: OTHER | Facility: CLINIC | Age: 86
End: 2021-01-01

## 2021-01-01 ENCOUNTER — ANESTHESIA EVENT (OUTPATIENT)
Dept: SURGERY | Facility: CLINIC | Age: 86
End: 2021-01-01
Payer: MEDICARE

## 2021-01-01 ENCOUNTER — APPOINTMENT (OUTPATIENT)
Dept: CT IMAGING | Facility: CLINIC | Age: 86
DRG: 981 | End: 2021-01-01
Attending: PHYSICIAN ASSISTANT
Payer: MEDICARE

## 2021-01-01 ENCOUNTER — PATIENT OUTREACH (OUTPATIENT)
Dept: ONCOLOGY | Facility: CLINIC | Age: 86
End: 2021-01-01

## 2021-01-01 ENCOUNTER — HOSPITAL ENCOUNTER (OUTPATIENT)
Dept: CT IMAGING | Facility: HOSPITAL | Age: 86
End: 2021-09-03
Attending: NURSE PRACTITIONER
Payer: MEDICARE

## 2021-01-01 ENCOUNTER — TELEPHONE (OUTPATIENT)
Dept: ONCOLOGY | Facility: CLINIC | Age: 86
End: 2021-01-01

## 2021-01-01 ENCOUNTER — MEDICAL CORRESPONDENCE (OUTPATIENT)
Dept: HEALTH INFORMATION MANAGEMENT | Facility: CLINIC | Age: 86
End: 2021-01-01
Payer: MEDICARE

## 2021-01-01 ENCOUNTER — ANCILLARY PROCEDURE (OUTPATIENT)
Dept: ULTRASOUND IMAGING | Facility: CLINIC | Age: 86
End: 2021-01-01
Payer: MEDICARE

## 2021-01-01 ENCOUNTER — APPOINTMENT (OUTPATIENT)
Dept: PHYSICAL THERAPY | Facility: CLINIC | Age: 86
DRG: 981 | End: 2021-01-01
Attending: HOSPITALIST
Payer: MEDICARE

## 2021-01-01 ENCOUNTER — APPOINTMENT (OUTPATIENT)
Dept: CT IMAGING | Facility: CLINIC | Age: 86
DRG: 981 | End: 2021-01-01
Attending: NURSE PRACTITIONER
Payer: MEDICARE

## 2021-01-01 ENCOUNTER — HOSPITAL ENCOUNTER (INPATIENT)
Facility: CLINIC | Age: 86
LOS: 13 days | Discharge: HOME | DRG: 981 | End: 2021-08-19
Attending: EMERGENCY MEDICINE | Admitting: HOSPITALIST
Payer: MEDICARE

## 2021-01-01 ENCOUNTER — APPOINTMENT (OUTPATIENT)
Dept: RADIOLOGY | Facility: CLINIC | Age: 86
DRG: 981 | End: 2021-01-01
Attending: HOSPITALIST
Payer: MEDICARE

## 2021-01-01 ENCOUNTER — HOSPITAL ENCOUNTER (OUTPATIENT)
Dept: INTERVENTIONAL RADIOLOGY/VASCULAR | Facility: HOSPITAL | Age: 86
End: 2021-09-03
Attending: NURSE PRACTITIONER
Payer: MEDICARE

## 2021-01-01 VITALS
DIASTOLIC BLOOD PRESSURE: 57 MMHG | BODY MASS INDEX: 24.18 KG/M2 | HEIGHT: 63 IN | SYSTOLIC BLOOD PRESSURE: 122 MMHG | TEMPERATURE: 98.4 F | RESPIRATION RATE: 17 BRPM | OXYGEN SATURATION: 94 % | WEIGHT: 136.47 LBS | HEART RATE: 67 BPM

## 2021-01-01 VITALS — HEIGHT: 64 IN | BODY MASS INDEX: 26.12 KG/M2 | WEIGHT: 153 LBS

## 2021-01-01 VITALS
WEIGHT: 127.1 LBS | DIASTOLIC BLOOD PRESSURE: 64 MMHG | HEART RATE: 90 BPM | TEMPERATURE: 98.1 F | OXYGEN SATURATION: 93 % | SYSTOLIC BLOOD PRESSURE: 139 MMHG | HEIGHT: 62 IN | RESPIRATION RATE: 18 BRPM | BODY MASS INDEX: 23.39 KG/M2

## 2021-01-01 VITALS
WEIGHT: 134 LBS | DIASTOLIC BLOOD PRESSURE: 66 MMHG | TEMPERATURE: 98.2 F | OXYGEN SATURATION: 97 % | BODY MASS INDEX: 23.74 KG/M2 | HEART RATE: 83 BPM | SYSTOLIC BLOOD PRESSURE: 166 MMHG

## 2021-01-01 VITALS
TEMPERATURE: 98 F | DIASTOLIC BLOOD PRESSURE: 83 MMHG | RESPIRATION RATE: 18 BRPM | BODY MASS INDEX: 24.03 KG/M2 | SYSTOLIC BLOOD PRESSURE: 184 MMHG | HEART RATE: 77 BPM | OXYGEN SATURATION: 98 % | WEIGHT: 137.3 LBS

## 2021-01-01 VITALS — HEIGHT: 64 IN | WEIGHT: 149 LBS | BODY MASS INDEX: 25.44 KG/M2

## 2021-01-01 VITALS — WEIGHT: 148 LBS | BODY MASS INDEX: 26.22 KG/M2 | HEIGHT: 63 IN

## 2021-01-01 VITALS — BODY MASS INDEX: 26.43 KG/M2 | WEIGHT: 154 LBS

## 2021-01-01 VITALS
OXYGEN SATURATION: 94 % | RESPIRATION RATE: 20 BRPM | SYSTOLIC BLOOD PRESSURE: 132 MMHG | HEIGHT: 62 IN | WEIGHT: 127.87 LBS | TEMPERATURE: 96.8 F | HEART RATE: 80 BPM | BODY MASS INDEX: 23.53 KG/M2 | DIASTOLIC BLOOD PRESSURE: 92 MMHG

## 2021-01-01 VITALS — BODY MASS INDEX: 26.93 KG/M2 | WEIGHT: 152 LBS

## 2021-01-01 VITALS — HEIGHT: 64 IN | WEIGHT: 152 LBS | BODY MASS INDEX: 25.95 KG/M2

## 2021-01-01 VITALS — BODY MASS INDEX: 26.26 KG/M2 | WEIGHT: 153 LBS

## 2021-01-01 DIAGNOSIS — N30.00 ACUTE CYSTITIS WITHOUT HEMATURIA: ICD-10-CM

## 2021-01-01 DIAGNOSIS — D50.9 IRON DEFICIENCY ANEMIA, UNSPECIFIED: ICD-10-CM

## 2021-01-01 DIAGNOSIS — Z48.03 CHANGE OR REMOVAL OF DRAINS: ICD-10-CM

## 2021-01-01 DIAGNOSIS — N73.9 PELVIC ABSCESS IN FEMALE: Primary | ICD-10-CM

## 2021-01-01 DIAGNOSIS — R19.00 PELVIC MASS: ICD-10-CM

## 2021-01-01 DIAGNOSIS — E87.6 HYPOKALEMIA: ICD-10-CM

## 2021-01-01 DIAGNOSIS — R19.7 DIARRHEA, UNSPECIFIED: ICD-10-CM

## 2021-01-01 DIAGNOSIS — R79.89 OTHER SPECIFIED ABNORMAL FINDINGS OF BLOOD CHEMISTRY: ICD-10-CM

## 2021-01-01 DIAGNOSIS — I48.91 ATRIAL FIBRILLATION WITH RAPID VENTRICULAR RESPONSE (H): ICD-10-CM

## 2021-01-01 DIAGNOSIS — D72.829 ELEVATED WHITE BLOOD CELL COUNT, UNSPECIFIED: ICD-10-CM

## 2021-01-01 DIAGNOSIS — I50.30 UNSPECIFIED DIASTOLIC (CONGESTIVE) HEART FAILURE (H): ICD-10-CM

## 2021-01-01 DIAGNOSIS — Z48.03 CHANGE OR REMOVAL OF DRAINS: Primary | ICD-10-CM

## 2021-01-01 DIAGNOSIS — L25.8 INCONTINENCE ASSOCIATED DERMATITIS: ICD-10-CM

## 2021-01-01 DIAGNOSIS — Z01.818 PREOP GENERAL PHYSICAL EXAM: ICD-10-CM

## 2021-01-01 DIAGNOSIS — A04.72 ENTEROCOLITIS DUE TO CLOSTRIDIUM DIFFICILE, NOT SPECIFIED AS RECURRENT: ICD-10-CM

## 2021-01-01 DIAGNOSIS — Z11.59 ENCOUNTER FOR SCREENING FOR OTHER VIRAL DISEASES: Primary | ICD-10-CM

## 2021-01-01 DIAGNOSIS — Z98.890 S/P LAPAROSCOPY: Primary | ICD-10-CM

## 2021-01-01 DIAGNOSIS — R19.00 PELVIC MASS: Primary | ICD-10-CM

## 2021-01-01 DIAGNOSIS — R32 INCONTINENCE ASSOCIATED DERMATITIS: ICD-10-CM

## 2021-01-01 DIAGNOSIS — R68.83 CHILLS: ICD-10-CM

## 2021-01-01 LAB
ABO + RH BLD: NORMAL
ABO + RH BLD: NORMAL
ALBUMIN SERPL-MCNC: 1.4 G/DL (ref 3.5–5)
ALBUMIN SERPL-MCNC: 2.4 G/DL (ref 3.5–5)
ALBUMIN UR-MCNC: 10 MG/DL
ALP SERPL-CCNC: 126 U/L (ref 45–120)
ALP SERPL-CCNC: 81 U/L (ref 45–120)
ALT SERPL W P-5'-P-CCNC: 21 U/L (ref 0–45)
ALT SERPL W P-5'-P-CCNC: 26 U/L (ref 0–45)
ANION GAP SERPL CALCULATED.3IONS-SCNC: 10 MMOL/L (ref 5–18)
ANION GAP SERPL CALCULATED.3IONS-SCNC: 12 MMOL/L (ref 5–18)
ANION GAP SERPL CALCULATED.3IONS-SCNC: 5 MMOL/L (ref 5–18)
ANION GAP SERPL CALCULATED.3IONS-SCNC: 6 MMOL/L (ref 5–18)
ANION GAP SERPL CALCULATED.3IONS-SCNC: 7 MMOL/L (ref 5–18)
ANION GAP SERPL CALCULATED.3IONS-SCNC: 8 MMOL/L (ref 5–18)
APPEARANCE UR: ABNORMAL
AST SERPL W P-5'-P-CCNC: 22 U/L (ref 0–40)
AST SERPL W P-5'-P-CCNC: 24 U/L (ref 0–40)
BACTERIA #/AREA URNS HPF: ABNORMAL /HPF
BACTERIA ABSC ANAEROBE+AEROBE CULT: ABNORMAL
BACTERIA ABSC ANAEROBE+AEROBE CULT: NORMAL
BACTERIA BLD CULT: NO GROWTH
BACTERIA UR CULT: ABNORMAL
BASOPHILS # BLD AUTO: 0 10E3/UL (ref 0–0.2)
BASOPHILS # BLD AUTO: 0.1 10E3/UL (ref 0–0.2)
BASOPHILS NFR BLD AUTO: 0 %
BASOPHILS NFR BLD AUTO: 1 %
BILIRUB SERPL-MCNC: 0.6 MG/DL (ref 0–1)
BILIRUB SERPL-MCNC: 0.7 MG/DL (ref 0–1)
BILIRUB UR QL STRIP: NEGATIVE
BLD GP AB SCN SERPL QL: NORMAL
BLOOD BANK CMNT PATIENT-IMP: NORMAL
BUN SERPL-MCNC: 10 MG/DL (ref 8–28)
BUN SERPL-MCNC: 10 MG/DL (ref 8–28)
BUN SERPL-MCNC: 12 MG/DL (ref 8–28)
BUN SERPL-MCNC: 14 MG/DL (ref 8–28)
BUN SERPL-MCNC: 15 MG/DL (ref 8–28)
BUN SERPL-MCNC: 17 MG/DL (ref 8–28)
BUN SERPL-MCNC: 19 MG/DL (ref 8–28)
BUN SERPL-MCNC: 26 MG/DL (ref 8–28)
BUN SERPL-MCNC: 27 MG/DL (ref 8–28)
BUN SERPL-MCNC: 7 MG/DL (ref 8–28)
BUN SERPL-MCNC: 8 MG/DL (ref 8–28)
BUN SERPL-MCNC: 8 MG/DL (ref 8–28)
BUN SERPL-MCNC: 9 MG/DL (ref 8–28)
BUN SERPL-MCNC: 9 MG/DL (ref 8–28)
C DIFF TOX B STL QL: POSITIVE
CALCIUM SERPL-MCNC: 7.6 MG/DL (ref 8.5–10.5)
CALCIUM SERPL-MCNC: 7.7 MG/DL (ref 8.5–10.5)
CALCIUM SERPL-MCNC: 7.8 MG/DL (ref 8.5–10.5)
CALCIUM SERPL-MCNC: 7.9 MG/DL (ref 8.5–10.5)
CALCIUM SERPL-MCNC: 8 MG/DL (ref 8.5–10.5)
CALCIUM SERPL-MCNC: 8 MG/DL (ref 8.5–10.5)
CALCIUM SERPL-MCNC: 8.1 MG/DL (ref 8.5–10.5)
CALCIUM SERPL-MCNC: 8.1 MG/DL (ref 8.5–10.5)
CALCIUM SERPL-MCNC: 8.2 MG/DL (ref 8.5–10.5)
CALCIUM SERPL-MCNC: 8.6 MG/DL (ref 8.5–10.5)
CALCIUM SERPL-MCNC: 8.6 MG/DL (ref 8.5–10.5)
CALCIUM SERPL-MCNC: 8.8 MG/DL (ref 8.5–10.5)
CHLORIDE BLD-SCNC: 100 MMOL/L (ref 98–107)
CHLORIDE BLD-SCNC: 102 MMOL/L (ref 98–107)
CHLORIDE BLD-SCNC: 103 MMOL/L (ref 98–107)
CHLORIDE BLD-SCNC: 105 MMOL/L (ref 98–107)
CHLORIDE BLD-SCNC: 91 MMOL/L (ref 98–107)
CHLORIDE BLD-SCNC: 93 MMOL/L (ref 98–107)
CHLORIDE BLD-SCNC: 95 MMOL/L (ref 98–107)
CHLORIDE BLD-SCNC: 95 MMOL/L (ref 98–107)
CHLORIDE BLD-SCNC: 96 MMOL/L (ref 98–107)
CHLORIDE BLD-SCNC: 96 MMOL/L (ref 98–107)
CHLORIDE BLD-SCNC: 97 MMOL/L (ref 98–107)
CHLORIDE BLD-SCNC: 98 MMOL/L (ref 98–107)
CHLORIDE BLD-SCNC: 99 MMOL/L (ref 98–107)
CHLORIDE BLD-SCNC: 99 MMOL/L (ref 98–107)
CO2 SERPL-SCNC: 20 MMOL/L (ref 22–31)
CO2 SERPL-SCNC: 20 MMOL/L (ref 22–31)
CO2 SERPL-SCNC: 21 MMOL/L (ref 22–31)
CO2 SERPL-SCNC: 22 MMOL/L (ref 22–31)
CO2 SERPL-SCNC: 22 MMOL/L (ref 22–31)
CO2 SERPL-SCNC: 23 MMOL/L (ref 22–31)
CO2 SERPL-SCNC: 24 MMOL/L (ref 22–31)
CO2 SERPL-SCNC: 25 MMOL/L (ref 22–31)
CO2 SERPL-SCNC: 26 MMOL/L (ref 22–31)
CO2 SERPL-SCNC: 28 MMOL/L (ref 22–31)
CO2 SERPL-SCNC: 31 MMOL/L (ref 22–31)
COLOR UR AUTO: YELLOW
COPATH REPORT: NORMAL
CREAT SERPL-MCNC: 0.46 MG/DL (ref 0.6–1.1)
CREAT SERPL-MCNC: 0.49 MG/DL (ref 0.6–1.1)
CREAT SERPL-MCNC: 0.5 MG/DL (ref 0.6–1.1)
CREAT SERPL-MCNC: 0.52 MG/DL (ref 0.6–1.1)
CREAT SERPL-MCNC: 0.52 MG/DL (ref 0.6–1.1)
CREAT SERPL-MCNC: 0.55 MG/DL (ref 0.6–1.1)
CREAT SERPL-MCNC: 0.56 MG/DL (ref 0.6–1.1)
CREAT SERPL-MCNC: 0.57 MG/DL (ref 0.6–1.1)
CREAT SERPL-MCNC: 0.58 MG/DL (ref 0.6–1.1)
CREAT SERPL-MCNC: 0.59 MG/DL (ref 0.6–1.1)
CREAT SERPL-MCNC: 0.62 MG/DL (ref 0.6–1.1)
CREAT SERPL-MCNC: 0.64 MG/DL (ref 0.6–1.1)
CREAT SERPL-MCNC: 0.64 MG/DL (ref 0.6–1.1)
CREAT SERPL-MCNC: 0.66 MG/DL (ref 0.6–1.1)
CREAT SERPL-MCNC: 1.02 MG/DL (ref 0.6–1.1)
EOSINOPHIL # BLD AUTO: 0 10E3/UL (ref 0–0.7)
EOSINOPHIL # BLD AUTO: 0 10E3/UL (ref 0–0.7)
EOSINOPHIL NFR BLD AUTO: 0 %
EOSINOPHIL NFR BLD AUTO: 0 %
ERYTHROCYTE [DISTWIDTH] IN BLOOD BY AUTOMATED COUNT: 13.5 % (ref 10–15)
ERYTHROCYTE [DISTWIDTH] IN BLOOD BY AUTOMATED COUNT: 13.7 % (ref 10–15)
ERYTHROCYTE [DISTWIDTH] IN BLOOD BY AUTOMATED COUNT: 14.1 % (ref 10–15)
ERYTHROCYTE [DISTWIDTH] IN BLOOD BY AUTOMATED COUNT: 14.4 % (ref 10–15)
ERYTHROCYTE [DISTWIDTH] IN BLOOD BY AUTOMATED COUNT: 14.5 % (ref 10–15)
ERYTHROCYTE [DISTWIDTH] IN BLOOD BY AUTOMATED COUNT: 14.5 % (ref 10–15)
ERYTHROCYTE [DISTWIDTH] IN BLOOD BY AUTOMATED COUNT: 14.6 % (ref 10–15)
ERYTHROCYTE [DISTWIDTH] IN BLOOD BY AUTOMATED COUNT: 14.6 % (ref 10–15)
ERYTHROCYTE [DISTWIDTH] IN BLOOD BY AUTOMATED COUNT: 14.8 % (ref 10–15)
ERYTHROCYTE [DISTWIDTH] IN BLOOD BY AUTOMATED COUNT: 14.8 % (ref 10–15)
ERYTHROCYTE [DISTWIDTH] IN BLOOD BY AUTOMATED COUNT: 15.2 % (ref 10–15)
ERYTHROCYTE [DISTWIDTH] IN BLOOD BY AUTOMATED COUNT: 15.3 % (ref 10–15)
ERYTHROCYTE [DISTWIDTH] IN BLOOD BY AUTOMATED COUNT: 15.6 % (ref 10–15)
ERYTHROCYTE [DISTWIDTH] IN BLOOD BY AUTOMATED COUNT: 15.7 % (ref 10–15)
ERYTHROCYTE [DISTWIDTH] IN BLOOD BY AUTOMATED COUNT: 15.9 % (ref 10–15)
ERYTHROCYTE [DISTWIDTH] IN BLOOD BY AUTOMATED COUNT: 16.9 % (ref 10–15)
ERYTHROCYTE [DISTWIDTH] IN BLOOD BY AUTOMATED COUNT: 18.9 % (ref 10–15)
ERYTHROCYTE [DISTWIDTH] IN BLOOD BY AUTOMATED COUNT: 19 % (ref 10–15)
ERYTHROCYTE [DISTWIDTH] IN BLOOD BY AUTOMATED COUNT: 19 % (ref 10–15)
ERYTHROCYTE [DISTWIDTH] IN BLOOD BY AUTOMATED COUNT: 19.8 % (ref 10–15)
ERYTHROCYTE [DISTWIDTH] IN BLOOD BY AUTOMATED COUNT: 19.9 % (ref 10–15)
ERYTHROCYTE [DISTWIDTH] IN BLOOD BY AUTOMATED COUNT: 20 % (ref 10–15)
FERRITIN SERPL-MCNC: 68 NG/ML (ref 10–130)
GFR SERPL CREATININE-BSD FRML MDRD: 48 ML/MIN/1.73M2
GFR SERPL CREATININE-BSD FRML MDRD: 77 ML/MIN/1.73M2
GFR SERPL CREATININE-BSD FRML MDRD: 78 ML/MIN/1.73M2
GFR SERPL CREATININE-BSD FRML MDRD: 78 ML/MIN/1.73M2
GFR SERPL CREATININE-BSD FRML MDRD: 79 ML/MIN/1.73M2
GFR SERPL CREATININE-BSD FRML MDRD: 80 ML/MIN/1.73M2
GFR SERPL CREATININE-BSD FRML MDRD: 81 ML/MIN/1.73M2
GFR SERPL CREATININE-BSD FRML MDRD: 81 ML/MIN/1.73M2
GFR SERPL CREATININE-BSD FRML MDRD: 82 ML/MIN/1.73M2
GFR SERPL CREATININE-BSD FRML MDRD: 82 ML/MIN/1.73M2
GFR SERPL CREATININE-BSD FRML MDRD: 84 ML/MIN/1.73M2
GFR SERPL CREATININE-BSD FRML MDRD: 85 ML/MIN/1.73M2
GFR SERPL CREATININE-BSD FRML MDRD: 87 ML/MIN/1.73M2
GLUCOSE BLD-MCNC: 103 MG/DL (ref 70–125)
GLUCOSE BLD-MCNC: 105 MG/DL (ref 70–125)
GLUCOSE BLD-MCNC: 106 MG/DL (ref 70–125)
GLUCOSE BLD-MCNC: 110 MG/DL (ref 70–125)
GLUCOSE BLD-MCNC: 112 MG/DL (ref 70–125)
GLUCOSE BLD-MCNC: 117 MG/DL (ref 70–125)
GLUCOSE BLD-MCNC: 123 MG/DL (ref 70–125)
GLUCOSE BLD-MCNC: 125 MG/DL (ref 70–125)
GLUCOSE BLD-MCNC: 129 MG/DL (ref 70–125)
GLUCOSE BLD-MCNC: 182 MG/DL (ref 70–125)
GLUCOSE BLD-MCNC: 89 MG/DL (ref 70–125)
GLUCOSE BLD-MCNC: 96 MG/DL (ref 70–125)
GLUCOSE BLD-MCNC: 97 MG/DL (ref 70–125)
GLUCOSE BLD-MCNC: 97 MG/DL (ref 70–125)
GLUCOSE BLD-MCNC: 99 MG/DL (ref 70–125)
GLUCOSE BLD-MCNC: 99 MG/DL (ref 70–125)
GLUCOSE BLDC GLUCOMTR-MCNC: 101 MG/DL (ref 70–99)
GLUCOSE BLDC GLUCOMTR-MCNC: 102 MG/DL (ref 70–125)
GLUCOSE BLDC GLUCOMTR-MCNC: 130 MG/DL (ref 70–125)
GLUCOSE BLDC GLUCOMTR-MCNC: 140 MG/DL (ref 70–99)
GLUCOSE BLDC GLUCOMTR-MCNC: 152 MG/DL (ref 70–125)
GLUCOSE UR STRIP-MCNC: NEGATIVE MG/DL
GRAM STAIN RESULT: ABNORMAL
HCT VFR BLD AUTO: 25.9 % (ref 35–47)
HCT VFR BLD AUTO: 26.6 % (ref 35–47)
HCT VFR BLD AUTO: 28.4 % (ref 35–47)
HCT VFR BLD AUTO: 28.9 % (ref 35–47)
HCT VFR BLD AUTO: 29 % (ref 35–47)
HCT VFR BLD AUTO: 29 % (ref 35–47)
HCT VFR BLD AUTO: 29.1 % (ref 35–47)
HCT VFR BLD AUTO: 29.1 % (ref 35–47)
HCT VFR BLD AUTO: 29.3 % (ref 35–47)
HCT VFR BLD AUTO: 29.3 % (ref 35–47)
HCT VFR BLD AUTO: 29.5 % (ref 35–47)
HCT VFR BLD AUTO: 29.6 % (ref 35–47)
HCT VFR BLD AUTO: 29.7 % (ref 35–47)
HCT VFR BLD AUTO: 29.8 % (ref 35–47)
HCT VFR BLD AUTO: 29.8 % (ref 35–47)
HCT VFR BLD AUTO: 30.7 % (ref 35–47)
HCT VFR BLD AUTO: 31 % (ref 35–47)
HCT VFR BLD AUTO: 31.5 % (ref 35–47)
HCT VFR BLD AUTO: 31.5 % (ref 35–47)
HCT VFR BLD AUTO: 31.7 % (ref 35–47)
HCT VFR BLD AUTO: 32.2 % (ref 35–47)
HCT VFR BLD AUTO: 32.5 % (ref 35–47)
HGB BLD-MCNC: 10 G/DL (ref 11.7–15.7)
HGB BLD-MCNC: 10.2 G/DL (ref 11.7–15.7)
HGB BLD-MCNC: 10.3 G/DL (ref 11.7–15.7)
HGB BLD-MCNC: 10.4 G/DL (ref 11.7–15.7)
HGB BLD-MCNC: 10.6 G/DL (ref 11.7–15.7)
HGB BLD-MCNC: 8.4 G/DL (ref 11.7–15.7)
HGB BLD-MCNC: 8.7 G/DL (ref 11.7–15.7)
HGB BLD-MCNC: 9.3 G/DL (ref 11.7–15.7)
HGB BLD-MCNC: 9.3 G/DL (ref 11.7–15.7)
HGB BLD-MCNC: 9.4 G/DL (ref 11.7–15.7)
HGB BLD-MCNC: 9.5 G/DL (ref 11.7–15.7)
HGB BLD-MCNC: 9.6 G/DL (ref 11.7–15.7)
HGB BLD-MCNC: 9.7 G/DL (ref 11.7–15.7)
HGB BLD-MCNC: 9.7 G/DL (ref 11.7–15.7)
HGB BLD-MCNC: 9.8 G/DL (ref 11.7–15.7)
HGB UR QL STRIP: ABNORMAL
HOLD SPECIMEN: NORMAL
IMM GRANULOCYTES # BLD: 0 10E3/UL
IMM GRANULOCYTES # BLD: 0.1 10E3/UL
IMM GRANULOCYTES NFR BLD: 0 %
IMM GRANULOCYTES NFR BLD: 1 %
INR PPP: 1.58 (ref 0.85–1.15)
KETONES UR STRIP-MCNC: NEGATIVE MG/DL
LACTATE SERPL-SCNC: 0.7 MMOL/L (ref 0.7–2)
LACTATE SERPL-SCNC: 1.1 MMOL/L (ref 0.7–2)
LEUKOCYTE ESTERASE UR QL STRIP: ABNORMAL
LIPASE SERPL-CCNC: 24 U/L (ref 0–52)
LYMPHOCYTES # BLD AUTO: 1 10E3/UL (ref 0.8–5.3)
LYMPHOCYTES # BLD AUTO: 1 10E3/UL (ref 0.8–5.3)
LYMPHOCYTES NFR BLD AUTO: 10 %
LYMPHOCYTES NFR BLD AUTO: 22 %
MAGNESIUM SERPL-MCNC: 1.6 MG/DL (ref 1.8–2.6)
MAGNESIUM SERPL-MCNC: 1.7 MG/DL (ref 1.8–2.6)
MAGNESIUM SERPL-MCNC: 1.7 MG/DL (ref 1.8–2.6)
MAGNESIUM SERPL-MCNC: 1.8 MG/DL (ref 1.8–2.6)
MAGNESIUM SERPL-MCNC: 1.9 MG/DL (ref 1.8–2.6)
MAGNESIUM SERPL-MCNC: 2 MG/DL (ref 1.8–2.6)
MAGNESIUM SERPL-MCNC: 2 MG/DL (ref 1.8–2.6)
MCH RBC QN AUTO: 24.6 PG (ref 26.5–33)
MCH RBC QN AUTO: 24.7 PG (ref 26.5–33)
MCH RBC QN AUTO: 24.8 PG (ref 26.5–33)
MCH RBC QN AUTO: 24.8 PG (ref 26.5–33)
MCH RBC QN AUTO: 24.9 PG (ref 26.5–33)
MCH RBC QN AUTO: 25.1 PG (ref 26.5–33)
MCH RBC QN AUTO: 25.2 PG (ref 26.5–33)
MCH RBC QN AUTO: 25.3 PG (ref 26.5–33)
MCH RBC QN AUTO: 25.4 PG (ref 26.5–33)
MCH RBC QN AUTO: 25.6 PG (ref 26.5–33)
MCH RBC QN AUTO: 25.6 PG (ref 26.5–33)
MCH RBC QN AUTO: 25.7 PG (ref 26.5–33)
MCH RBC QN AUTO: 26 PG (ref 26.5–33)
MCH RBC QN AUTO: 26 PG (ref 26.5–33)
MCH RBC QN AUTO: 26.1 PG (ref 26.5–33)
MCH RBC QN AUTO: 26.7 PG (ref 26.5–33)
MCH RBC QN AUTO: 26.8 PG (ref 26.5–33)
MCH RBC QN AUTO: 26.9 PG (ref 26.5–33)
MCHC RBC AUTO-ENTMCNC: 31.9 G/DL (ref 31.5–36.5)
MCHC RBC AUTO-ENTMCNC: 31.9 G/DL (ref 31.5–36.5)
MCHC RBC AUTO-ENTMCNC: 32 G/DL (ref 31.5–36.5)
MCHC RBC AUTO-ENTMCNC: 32.1 G/DL (ref 31.5–36.5)
MCHC RBC AUTO-ENTMCNC: 32.1 G/DL (ref 31.5–36.5)
MCHC RBC AUTO-ENTMCNC: 32.2 G/DL (ref 31.5–36.5)
MCHC RBC AUTO-ENTMCNC: 32.2 G/DL (ref 31.5–36.5)
MCHC RBC AUTO-ENTMCNC: 32.3 G/DL (ref 31.5–36.5)
MCHC RBC AUTO-ENTMCNC: 32.4 G/DL (ref 31.5–36.5)
MCHC RBC AUTO-ENTMCNC: 32.4 G/DL (ref 31.5–36.5)
MCHC RBC AUTO-ENTMCNC: 32.6 G/DL (ref 31.5–36.5)
MCHC RBC AUTO-ENTMCNC: 32.7 G/DL (ref 31.5–36.5)
MCHC RBC AUTO-ENTMCNC: 32.8 G/DL (ref 31.5–36.5)
MCHC RBC AUTO-ENTMCNC: 32.9 G/DL (ref 31.5–36.5)
MCHC RBC AUTO-ENTMCNC: 33 G/DL (ref 31.5–36.5)
MCHC RBC AUTO-ENTMCNC: 33.1 G/DL (ref 31.5–36.5)
MCHC RBC AUTO-ENTMCNC: 33.1 G/DL (ref 31.5–36.5)
MCHC RBC AUTO-ENTMCNC: 33.2 G/DL (ref 31.5–36.5)
MCV RBC AUTO: 76 FL (ref 78–100)
MCV RBC AUTO: 77 FL (ref 78–100)
MCV RBC AUTO: 78 FL (ref 78–100)
MCV RBC AUTO: 81 FL (ref 78–100)
MCV RBC AUTO: 81 FL (ref 78–100)
MCV RBC AUTO: 82 FL (ref 78–100)
MCV RBC AUTO: 82 FL (ref 78–100)
MCV RBC AUTO: 83 FL (ref 78–100)
MCV RBC AUTO: 84 FL (ref 78–100)
MONOCYTES # BLD AUTO: 0.4 10E3/UL (ref 0–1.3)
MONOCYTES # BLD AUTO: 0.9 10E3/UL (ref 0–1.3)
MONOCYTES NFR BLD AUTO: 8 %
MONOCYTES NFR BLD AUTO: 9 %
MUCOUS THREADS #/AREA URNS LPF: PRESENT /LPF
NEUTROPHILS # BLD AUTO: 3.1 10E3/UL (ref 1.6–8.3)
NEUTROPHILS # BLD AUTO: 7.8 10E3/UL (ref 1.6–8.3)
NEUTROPHILS NFR BLD AUTO: 69 %
NEUTROPHILS NFR BLD AUTO: 80 %
NITRATE UR QL: POSITIVE
NRBC # BLD AUTO: 0 10E3/UL
NRBC # BLD AUTO: 0 10E3/UL
NRBC BLD AUTO-RTO: 0 /100
NRBC BLD AUTO-RTO: 0 /100
PH UR STRIP: 6.5 [PH] (ref 5–7)
PLATELET # BLD AUTO: 328 10E3/UL (ref 150–450)
PLATELET # BLD AUTO: 337 10E3/UL (ref 150–450)
PLATELET # BLD AUTO: 377 10E3/UL (ref 150–450)
PLATELET # BLD AUTO: 391 10E3/UL (ref 150–450)
PLATELET # BLD AUTO: 394 10E3/UL (ref 150–450)
PLATELET # BLD AUTO: 394 10E3/UL (ref 150–450)
PLATELET # BLD AUTO: 459 10E3/UL (ref 150–450)
PLATELET # BLD AUTO: 466 10E3/UL (ref 150–450)
PLATELET # BLD AUTO: 469 10E3/UL (ref 150–450)
PLATELET # BLD AUTO: 475 10E3/UL (ref 150–450)
PLATELET # BLD AUTO: 480 10E3/UL (ref 150–450)
PLATELET # BLD AUTO: 480 10E3/UL (ref 150–450)
PLATELET # BLD AUTO: 483 10E3/UL (ref 150–450)
PLATELET # BLD AUTO: 486 10E3/UL (ref 150–450)
PLATELET # BLD AUTO: 489 10E3/UL (ref 150–450)
PLATELET # BLD AUTO: 492 10E3/UL (ref 150–450)
PLATELET # BLD AUTO: 503 10E3/UL (ref 150–450)
PLATELET # BLD AUTO: 503 10E3/UL (ref 150–450)
PLATELET # BLD AUTO: 525 10E3/UL (ref 150–450)
PLATELET # BLD AUTO: 529 10E3/UL (ref 150–450)
PLATELET # BLD AUTO: 535 10E3/UL (ref 150–450)
PLATELET # BLD AUTO: 555 10E3/UL (ref 150–450)
POTASSIUM BLD-SCNC: 2.6 MMOL/L (ref 3.5–5)
POTASSIUM BLD-SCNC: 2.9 MMOL/L (ref 3.5–5)
POTASSIUM BLD-SCNC: 3 MMOL/L (ref 3.5–5)
POTASSIUM BLD-SCNC: 3.1 MMOL/L (ref 3.5–5)
POTASSIUM BLD-SCNC: 3.1 MMOL/L (ref 3.5–5)
POTASSIUM BLD-SCNC: 3.2 MMOL/L (ref 3.5–5)
POTASSIUM BLD-SCNC: 3.2 MMOL/L (ref 3.5–5)
POTASSIUM BLD-SCNC: 3.3 MMOL/L (ref 3.5–5)
POTASSIUM BLD-SCNC: 3.3 MMOL/L (ref 3.5–5)
POTASSIUM BLD-SCNC: 3.4 MMOL/L (ref 3.5–5)
POTASSIUM BLD-SCNC: 3.6 MMOL/L (ref 3.5–5)
POTASSIUM BLD-SCNC: 3.6 MMOL/L (ref 3.5–5)
POTASSIUM BLD-SCNC: 3.7 MMOL/L (ref 3.5–5)
POTASSIUM BLD-SCNC: 3.8 MMOL/L (ref 3.5–5)
POTASSIUM BLD-SCNC: 3.8 MMOL/L (ref 3.5–5)
POTASSIUM BLD-SCNC: 3.9 MMOL/L (ref 3.5–5)
POTASSIUM BLD-SCNC: 4 MMOL/L (ref 3.5–5)
POTASSIUM BLD-SCNC: 4.1 MMOL/L (ref 3.5–5)
POTASSIUM BLD-SCNC: 4.1 MMOL/L (ref 3.5–5)
POTASSIUM BLD-SCNC: 4.3 MMOL/L (ref 3.5–5)
POTASSIUM BLD-SCNC: 4.5 MMOL/L (ref 3.5–5)
POTASSIUM BLD-SCNC: 4.5 MMOL/L (ref 3.5–5)
POTASSIUM BLD-SCNC: 5 MMOL/L (ref 3.5–5)
PROT SERPL-MCNC: 4.8 G/DL (ref 6–8)
PROT SERPL-MCNC: 6.9 G/DL (ref 6–8)
RBC # BLD AUTO: 3.14 10E6/UL (ref 3.8–5.2)
RBC # BLD AUTO: 3.23 10E6/UL (ref 3.8–5.2)
RBC # BLD AUTO: 3.52 10E6/UL (ref 3.8–5.2)
RBC # BLD AUTO: 3.65 10E6/UL (ref 3.8–5.2)
RBC # BLD AUTO: 3.7 10E6/UL (ref 3.8–5.2)
RBC # BLD AUTO: 3.74 10E6/UL (ref 3.8–5.2)
RBC # BLD AUTO: 3.75 10E6/UL (ref 3.8–5.2)
RBC # BLD AUTO: 3.76 10E6/UL (ref 3.8–5.2)
RBC # BLD AUTO: 3.79 10E6/UL (ref 3.8–5.2)
RBC # BLD AUTO: 3.79 10E6/UL (ref 3.8–5.2)
RBC # BLD AUTO: 3.81 10E6/UL (ref 3.8–5.2)
RBC # BLD AUTO: 3.82 10E6/UL (ref 3.8–5.2)
RBC # BLD AUTO: 3.82 10E6/UL (ref 3.8–5.2)
RBC # BLD AUTO: 3.83 10E6/UL (ref 3.8–5.2)
RBC # BLD AUTO: 3.86 10E6/UL (ref 3.8–5.2)
RBC # BLD AUTO: 3.92 10E6/UL (ref 3.8–5.2)
RBC # BLD AUTO: 3.93 10E6/UL (ref 3.8–5.2)
RBC # BLD AUTO: 3.98 10E6/UL (ref 3.8–5.2)
RBC # BLD AUTO: 4.06 10E6/UL (ref 3.8–5.2)
RBC # BLD AUTO: 4.07 10E6/UL (ref 3.8–5.2)
RBC # BLD AUTO: 4.18 10E6/UL (ref 3.8–5.2)
RBC # BLD AUTO: 4.2 10E6/UL (ref 3.8–5.2)
RBC URINE: 10 /HPF
SARS-COV-2 RNA RESP QL NAA+PROBE: NEGATIVE
SARS-COV-2 RNA RESP QL NAA+PROBE: NEGATIVE
SODIUM SERPL-SCNC: 124 MMOL/L (ref 136–145)
SODIUM SERPL-SCNC: 126 MMOL/L (ref 136–145)
SODIUM SERPL-SCNC: 127 MMOL/L (ref 136–145)
SODIUM SERPL-SCNC: 128 MMOL/L (ref 136–145)
SODIUM SERPL-SCNC: 129 MMOL/L (ref 136–145)
SODIUM SERPL-SCNC: 129 MMOL/L (ref 136–145)
SODIUM SERPL-SCNC: 130 MMOL/L (ref 136–145)
SODIUM SERPL-SCNC: 131 MMOL/L (ref 136–145)
SODIUM SERPL-SCNC: 131 MMOL/L (ref 136–145)
SODIUM SERPL-SCNC: 134 MMOL/L (ref 136–145)
SP GR UR STRIP: 1.01 (ref 1–1.03)
SPECIMEN EXP DATE BLD: NORMAL
SQUAMOUS EPITHELIAL: 1 /HPF
TROPONIN I SERPL-MCNC: 0.07 NG/ML (ref 0–0.29)
TSH SERPL DL<=0.005 MIU/L-ACNC: 1.41 UIU/ML (ref 0.3–5)
UFH PPP CHRO-ACNC: <=0.1 IU/ML
UROBILINOGEN UR STRIP-MCNC: 2 MG/DL
WBC # BLD AUTO: 10.5 10E3/UL (ref 4–11)
WBC # BLD AUTO: 10.5 10E3/UL (ref 4–11)
WBC # BLD AUTO: 10.6 10E3/UL (ref 4–11)
WBC # BLD AUTO: 10.8 10E3/UL (ref 4–11)
WBC # BLD AUTO: 10.8 10E3/UL (ref 4–11)
WBC # BLD AUTO: 11.5 10E3/UL (ref 4–11)
WBC # BLD AUTO: 12.6 10E3/UL (ref 4–11)
WBC # BLD AUTO: 15.6 10E3/UL (ref 4–11)
WBC # BLD AUTO: 15.8 10E3/UL (ref 4–11)
WBC # BLD AUTO: 17.1 10E3/UL (ref 4–11)
WBC # BLD AUTO: 17.4 10E3/UL (ref 4–11)
WBC # BLD AUTO: 17.8 10E3/UL (ref 4–11)
WBC # BLD AUTO: 18.4 10E3/UL (ref 4–11)
WBC # BLD AUTO: 20.5 10E3/UL (ref 4–11)
WBC # BLD AUTO: 4.5 10E3/UL (ref 4–11)
WBC # BLD AUTO: 5.3 10E3/UL (ref 4–11)
WBC # BLD AUTO: 6.3 10E3/UL (ref 4–11)
WBC # BLD AUTO: 7 10E3/UL (ref 4–11)
WBC # BLD AUTO: 9.5 10E3/UL (ref 4–11)
WBC # BLD AUTO: 9.6 10E3/UL (ref 4–11)
WBC # BLD AUTO: 9.6 10E3/UL (ref 4–11)
WBC # BLD AUTO: 9.8 10E3/UL (ref 4–11)
WBC URINE: 25 /HPF

## 2021-01-01 PROCEDURE — 84132 ASSAY OF SERUM POTASSIUM: CPT | Performed by: FAMILY MEDICINE

## 2021-01-01 PROCEDURE — 36415 COLL VENOUS BLD VENIPUNCTURE: CPT | Performed by: FAMILY MEDICINE

## 2021-01-01 PROCEDURE — 360N000076 HC SURGERY LEVEL 3, PER MIN: Performed by: OBSTETRICS & GYNECOLOGY

## 2021-01-01 PROCEDURE — 85027 COMPLETE CBC AUTOMATED: CPT | Performed by: PHYSICIAN ASSISTANT

## 2021-01-01 PROCEDURE — 250N000013 HC RX MED GY IP 250 OP 250 PS 637: Performed by: FAMILY MEDICINE

## 2021-01-01 PROCEDURE — 99233 SBSQ HOSP IP/OBS HIGH 50: CPT | Performed by: INTERNAL MEDICINE

## 2021-01-01 PROCEDURE — 250N000011 HC RX IP 250 OP 636: Performed by: HOSPITALIST

## 2021-01-01 PROCEDURE — 85027 COMPLETE CBC AUTOMATED: CPT | Performed by: NURSE PRACTITIONER

## 2021-01-01 PROCEDURE — 87086 URINE CULTURE/COLONY COUNT: CPT | Performed by: STUDENT IN AN ORGANIZED HEALTH CARE EDUCATION/TRAINING PROGRAM

## 2021-01-01 PROCEDURE — 93005 ELECTROCARDIOGRAM TRACING: CPT | Performed by: HOSPITALIST

## 2021-01-01 PROCEDURE — 36415 COLL VENOUS BLD VENIPUNCTURE: CPT | Performed by: HOSPITALIST

## 2021-01-01 PROCEDURE — 99231 SBSQ HOSP IP/OBS SF/LOW 25: CPT | Performed by: SPECIALIST

## 2021-01-01 PROCEDURE — 120N000004 HC R&B MS OVERFLOW

## 2021-01-01 PROCEDURE — 88341 IMHCHEM/IMCYTCHM EA ADD ANTB: CPT | Mod: TC | Performed by: OBSTETRICS & GYNECOLOGY

## 2021-01-01 PROCEDURE — 250N000011 HC RX IP 250 OP 636: Performed by: RADIOLOGY

## 2021-01-01 PROCEDURE — 250N000013 HC RX MED GY IP 250 OP 250 PS 637: Performed by: INTERNAL MEDICINE

## 2021-01-01 PROCEDURE — 84132 ASSAY OF SERUM POTASSIUM: CPT | Performed by: INTERNAL MEDICINE

## 2021-01-01 PROCEDURE — 36415 COLL VENOUS BLD VENIPUNCTURE: CPT | Performed by: INTERNAL MEDICINE

## 2021-01-01 PROCEDURE — 250N000011 HC RX IP 250 OP 636: Performed by: FAMILY MEDICINE

## 2021-01-01 PROCEDURE — 250N000011 HC RX IP 250 OP 636: Performed by: OBSTETRICS & GYNECOLOGY

## 2021-01-01 PROCEDURE — 97530 THERAPEUTIC ACTIVITIES: CPT | Mod: GP

## 2021-01-01 PROCEDURE — 99024 POSTOP FOLLOW-UP VISIT: CPT | Performed by: OBSTETRICS & GYNECOLOGY

## 2021-01-01 PROCEDURE — 82040 ASSAY OF SERUM ALBUMIN: CPT | Performed by: STUDENT IN AN ORGANIZED HEALTH CARE EDUCATION/TRAINING PROGRAM

## 2021-01-01 PROCEDURE — 99233 SBSQ HOSP IP/OBS HIGH 50: CPT | Performed by: FAMILY MEDICINE

## 2021-01-01 PROCEDURE — 250N000013 HC RX MED GY IP 250 OP 250 PS 637: Performed by: HOSPITALIST

## 2021-01-01 PROCEDURE — 258N000003 HC RX IP 258 OP 636: Performed by: HOSPITALIST

## 2021-01-01 PROCEDURE — 99214 OFFICE O/P EST MOD 30 MIN: CPT | Performed by: OBSTETRICS & GYNECOLOGY

## 2021-01-01 PROCEDURE — 250N000013 HC RX MED GY IP 250 OP 250 PS 637: Performed by: EMERGENCY MEDICINE

## 2021-01-01 PROCEDURE — 250N000013 HC RX MED GY IP 250 OP 250 PS 637: Performed by: STUDENT IN AN ORGANIZED HEALTH CARE EDUCATION/TRAINING PROGRAM

## 2021-01-01 PROCEDURE — 97535 SELF CARE MNGMENT TRAINING: CPT | Mod: GO

## 2021-01-01 PROCEDURE — 250N000011 HC RX IP 250 OP 636: Performed by: EMERGENCY MEDICINE

## 2021-01-01 PROCEDURE — G0378 HOSPITAL OBSERVATION PER HR: HCPCS

## 2021-01-01 PROCEDURE — 999N000250 HC STATISTIC ECG ASSIST

## 2021-01-01 PROCEDURE — 99232 SBSQ HOSP IP/OBS MODERATE 35: CPT | Performed by: FAMILY MEDICINE

## 2021-01-01 PROCEDURE — 80048 BASIC METABOLIC PNL TOTAL CA: CPT | Performed by: NURSE PRACTITIONER

## 2021-01-01 PROCEDURE — P9604 ONE-WAY ALLOW PRORATED TRIP: HCPCS | Performed by: NURSE PRACTITIONER

## 2021-01-01 PROCEDURE — 88307 TISSUE EXAM BY PATHOLOGIST: CPT | Mod: 26 | Performed by: PATHOLOGY

## 2021-01-01 PROCEDURE — 120N000001 HC R&B MED SURG/OB

## 2021-01-01 PROCEDURE — 87106 FUNGI IDENTIFICATION YEAST: CPT | Performed by: RADIOLOGY

## 2021-01-01 PROCEDURE — 86850 RBC ANTIBODY SCREEN: CPT | Performed by: ANESTHESIOLOGY

## 2021-01-01 PROCEDURE — 97110 THERAPEUTIC EXERCISES: CPT | Mod: GP

## 2021-01-01 PROCEDURE — 250N000011 HC RX IP 250 OP 636: Performed by: INTERNAL MEDICINE

## 2021-01-01 PROCEDURE — 97166 OT EVAL MOD COMPLEX 45 MIN: CPT | Mod: GO | Performed by: OCCUPATIONAL THERAPIST

## 2021-01-01 PROCEDURE — T2046 HOSPICE LONG TERM CARE, R&B: HCPCS

## 2021-01-01 PROCEDURE — 258N000003 HC RX IP 258 OP 636: Performed by: INTERNAL MEDICINE

## 2021-01-01 PROCEDURE — 250N000009 HC RX 250

## 2021-01-01 PROCEDURE — 36415 COLL VENOUS BLD VENIPUNCTURE: CPT | Performed by: PHYSICIAN ASSISTANT

## 2021-01-01 PROCEDURE — 88342 IMHCHEM/IMCYTCHM 1ST ANTB: CPT | Mod: 26 | Performed by: PATHOLOGY

## 2021-01-01 PROCEDURE — 83605 ASSAY OF LACTIC ACID: CPT | Performed by: HOSPITALIST

## 2021-01-01 PROCEDURE — 36415 COLL VENOUS BLD VENIPUNCTURE: CPT | Performed by: NURSE PRACTITIONER

## 2021-01-01 PROCEDURE — 80048 BASIC METABOLIC PNL TOTAL CA: CPT | Performed by: INTERNAL MEDICINE

## 2021-01-01 PROCEDURE — 99232 SBSQ HOSP IP/OBS MODERATE 35: CPT | Performed by: INTERNAL MEDICINE

## 2021-01-01 PROCEDURE — 99285 EMERGENCY DEPT VISIT HI MDM: CPT | Mod: 25

## 2021-01-01 PROCEDURE — 85014 HEMATOCRIT: CPT | Performed by: PHYSICIAN ASSISTANT

## 2021-01-01 PROCEDURE — 99207 PR BUNDLED PROCEDURE IN GLOBAL PKG STATISTIC: CPT | Performed by: OBSTETRICS & GYNECOLOGY

## 2021-01-01 PROCEDURE — 84443 ASSAY THYROID STIM HORMONE: CPT | Performed by: HOSPITALIST

## 2021-01-01 PROCEDURE — 85520 HEPARIN ASSAY: CPT | Performed by: HOSPITALIST

## 2021-01-01 PROCEDURE — 83735 ASSAY OF MAGNESIUM: CPT | Performed by: HOSPITALIST

## 2021-01-01 PROCEDURE — 49406 IMAGE CATH FLUID PERI/RETRO: CPT

## 2021-01-01 PROCEDURE — 250N000009 HC RX 250: Performed by: NURSE ANESTHETIST, CERTIFIED REGISTERED

## 2021-01-01 PROCEDURE — 80048 BASIC METABOLIC PNL TOTAL CA: CPT | Performed by: FAMILY MEDICINE

## 2021-01-01 PROCEDURE — 88331 PATH CONSLTJ SURG 1 BLK 1SPC: CPT | Mod: TC | Performed by: OBSTETRICS & GYNECOLOGY

## 2021-01-01 PROCEDURE — 99232 SBSQ HOSP IP/OBS MODERATE 35: CPT | Performed by: PHYSICIAN ASSISTANT

## 2021-01-01 PROCEDURE — 88342 IMHCHEM/IMCYTCHM 1ST ANTB: CPT | Mod: TC | Performed by: OBSTETRICS & GYNECOLOGY

## 2021-01-01 PROCEDURE — 87186 SC STD MICRODIL/AGAR DIL: CPT | Performed by: RADIOLOGY

## 2021-01-01 PROCEDURE — 85610 PROTHROMBIN TIME: CPT | Performed by: RADIOLOGY

## 2021-01-01 PROCEDURE — 99222 1ST HOSP IP/OBS MODERATE 55: CPT | Performed by: SPECIALIST

## 2021-01-01 PROCEDURE — 93306 TTE W/DOPPLER COMPLETE: CPT

## 2021-01-01 PROCEDURE — 87493 C DIFF AMPLIFIED PROBE: CPT | Performed by: FAMILY MEDICINE

## 2021-01-01 PROCEDURE — 87075 CULTR BACTERIA EXCEPT BLOOD: CPT | Performed by: RADIOLOGY

## 2021-01-01 PROCEDURE — 99207 PR CDG-CODE CATEGORY CHANGED: CPT | Performed by: INTERNAL MEDICINE

## 2021-01-01 PROCEDURE — 49424 ASSESS CYST CONTRAST INJECT: CPT

## 2021-01-01 PROCEDURE — 87070 CULTURE OTHR SPECIMN AEROBIC: CPT | Performed by: RADIOLOGY

## 2021-01-01 PROCEDURE — 99207 PR NO CHARGE LOS: CPT | Performed by: PHYSICIAN ASSISTANT

## 2021-01-01 PROCEDURE — 93306 TTE W/DOPPLER COMPLETE: CPT | Mod: 26 | Performed by: INTERNAL MEDICINE

## 2021-01-01 PROCEDURE — 88341 IMHCHEM/IMCYTCHM EA ADD ANTB: CPT | Mod: 26 | Performed by: PATHOLOGY

## 2021-01-01 PROCEDURE — 255N000002 HC RX 255 OP 636: Performed by: NURSE PRACTITIONER

## 2021-01-01 PROCEDURE — 83690 ASSAY OF LIPASE: CPT | Performed by: HOSPITALIST

## 2021-01-01 PROCEDURE — 80048 BASIC METABOLIC PNL TOTAL CA: CPT | Performed by: HOSPITALIST

## 2021-01-01 PROCEDURE — 82374 ASSAY BLOOD CARBON DIOXIDE: CPT | Performed by: FAMILY MEDICINE

## 2021-01-01 PROCEDURE — 85025 COMPLETE CBC W/AUTO DIFF WBC: CPT | Mod: ORL | Performed by: FAMILY MEDICINE

## 2021-01-01 PROCEDURE — 36415 COLL VENOUS BLD VENIPUNCTURE: CPT | Mod: ORL | Performed by: FAMILY MEDICINE

## 2021-01-01 PROCEDURE — 88331 PATH CONSLTJ SURG 1 BLK 1SPC: CPT | Mod: 26 | Performed by: PATHOLOGY

## 2021-01-01 PROCEDURE — 74177 CT ABD & PELVIS W/CONTRAST: CPT

## 2021-01-01 PROCEDURE — 250N000011 HC RX IP 250 OP 636

## 2021-01-01 PROCEDURE — 85027 COMPLETE CBC AUTOMATED: CPT | Performed by: INTERNAL MEDICINE

## 2021-01-01 PROCEDURE — 272N000431 CT ABDOMEN PERITONEUM ABSCESS ASPIRATION

## 2021-01-01 PROCEDURE — 99221 1ST HOSP IP/OBS SF/LOW 40: CPT | Performed by: INTERNAL MEDICINE

## 2021-01-01 PROCEDURE — 87205 SMEAR GRAM STAIN: CPT | Performed by: RADIOLOGY

## 2021-01-01 PROCEDURE — 999N000054 HC STATISTIC EKG NON-CHARGEABLE

## 2021-01-01 PROCEDURE — 80053 COMPREHEN METABOLIC PANEL: CPT | Performed by: FAMILY MEDICINE

## 2021-01-01 PROCEDURE — 77012 CT SCAN FOR NEEDLE BIOPSY: CPT

## 2021-01-01 PROCEDURE — 250N000009 HC RX 250: Performed by: HOSPITALIST

## 2021-01-01 PROCEDURE — 97535 SELF CARE MNGMENT TRAINING: CPT | Mod: GO | Performed by: OCCUPATIONAL THERAPIST

## 2021-01-01 PROCEDURE — G0463 HOSPITAL OUTPT CLINIC VISIT: HCPCS

## 2021-01-01 PROCEDURE — P9604 ONE-WAY ALLOW PRORATED TRIP: HCPCS | Performed by: FAMILY MEDICINE

## 2021-01-01 PROCEDURE — C9803 HOPD COVID-19 SPEC COLLECT: HCPCS

## 2021-01-01 PROCEDURE — 88305 TISSUE EXAM BY PATHOLOGIST: CPT | Mod: 26 | Performed by: PATHOLOGY

## 2021-01-01 PROCEDURE — 87635 SARS-COV-2 COVID-19 AMP PRB: CPT | Performed by: INTERNAL MEDICINE

## 2021-01-01 PROCEDURE — 210N000002 HC R&B HEART CARE

## 2021-01-01 PROCEDURE — 82728 ASSAY OF FERRITIN: CPT | Mod: ORL | Performed by: FAMILY MEDICINE

## 2021-01-01 PROCEDURE — 84132 ASSAY OF SERUM POTASSIUM: CPT | Performed by: HOSPITALIST

## 2021-01-01 PROCEDURE — 84484 ASSAY OF TROPONIN QUANT: CPT | Performed by: HOSPITALIST

## 2021-01-01 PROCEDURE — 0W9J40Z DRAINAGE OF PELVIC CAVITY WITH DRAINAGE DEVICE, PERCUTANEOUS ENDOSCOPIC APPROACH: ICD-10-PCS | Performed by: RADIOLOGY

## 2021-01-01 PROCEDURE — 99239 HOSP IP/OBS DSCHRG MGMT >30: CPT | Performed by: FAMILY MEDICINE

## 2021-01-01 PROCEDURE — 999N000141 HC STATISTIC PRE-PROCEDURE NURSING ASSESSMENT: Performed by: OBSTETRICS & GYNECOLOGY

## 2021-01-01 PROCEDURE — 99233 SBSQ HOSP IP/OBS HIGH 50: CPT | Performed by: HOSPITALIST

## 2021-01-01 PROCEDURE — 258N000003 HC RX IP 258 OP 636: Performed by: EMERGENCY MEDICINE

## 2021-01-01 PROCEDURE — 89051 BODY FLUID CELL COUNT: CPT | Performed by: RADIOLOGY

## 2021-01-01 PROCEDURE — 999N000157 HC STATISTIC RCP TIME EA 10 MIN

## 2021-01-01 PROCEDURE — 85027 COMPLETE CBC AUTOMATED: CPT | Mod: ORL | Performed by: FAMILY MEDICINE

## 2021-01-01 PROCEDURE — 82962 GLUCOSE BLOOD TEST: CPT

## 2021-01-01 PROCEDURE — 87635 SARS-COV-2 COVID-19 AMP PRB: CPT | Performed by: EMERGENCY MEDICINE

## 2021-01-01 PROCEDURE — 250N000025 HC SEVOFLURANE, PER MIN: Performed by: OBSTETRICS & GYNECOLOGY

## 2021-01-01 PROCEDURE — 99231 SBSQ HOSP IP/OBS SF/LOW 25: CPT | Performed by: PHYSICIAN ASSISTANT

## 2021-01-01 PROCEDURE — 81001 URINALYSIS AUTO W/SCOPE: CPT | Performed by: STUDENT IN AN ORGANIZED HEALTH CARE EDUCATION/TRAINING PROGRAM

## 2021-01-01 PROCEDURE — 74018 RADEX ABDOMEN 1 VIEW: CPT

## 2021-01-01 PROCEDURE — 36415 COLL VENOUS BLD VENIPUNCTURE: CPT | Performed by: STUDENT IN AN ORGANIZED HEALTH CARE EDUCATION/TRAINING PROGRAM

## 2021-01-01 PROCEDURE — 97110 THERAPEUTIC EXERCISES: CPT | Mod: GO

## 2021-01-01 PROCEDURE — 710N000010 HC RECOVERY PHASE 1, LEVEL 2, PER MIN: Performed by: OBSTETRICS & GYNECOLOGY

## 2021-01-01 PROCEDURE — 0W9J30Z DRAINAGE OF PELVIC CAVITY WITH DRAINAGE DEVICE, PERCUTANEOUS APPROACH: ICD-10-PCS | Performed by: RADIOLOGY

## 2021-01-01 PROCEDURE — 99231 SBSQ HOSP IP/OBS SF/LOW 25: CPT | Performed by: SURGERY

## 2021-01-01 PROCEDURE — 99223 1ST HOSP IP/OBS HIGH 75: CPT | Performed by: INTERNAL MEDICINE

## 2021-01-01 PROCEDURE — 370N000017 HC ANESTHESIA TECHNICAL FEE, PER MIN: Performed by: OBSTETRICS & GYNECOLOGY

## 2021-01-01 PROCEDURE — 272N000001 HC OR GENERAL SUPPLY STERILE: Performed by: OBSTETRICS & GYNECOLOGY

## 2021-01-01 PROCEDURE — 85014 HEMATOCRIT: CPT | Performed by: FAMILY MEDICINE

## 2021-01-01 PROCEDURE — 97116 GAIT TRAINING THERAPY: CPT | Mod: GP

## 2021-01-01 PROCEDURE — 96365 THER/PROPH/DIAG IV INF INIT: CPT

## 2021-01-01 PROCEDURE — 250N000011 HC RX IP 250 OP 636: Performed by: NURSE PRACTITIONER

## 2021-01-01 PROCEDURE — 86901 BLOOD TYPING SEROLOGIC RH(D): CPT | Performed by: ANESTHESIOLOGY

## 2021-01-01 PROCEDURE — 87040 BLOOD CULTURE FOR BACTERIA: CPT | Performed by: HOSPITALIST

## 2021-01-01 PROCEDURE — 258N000003 HC RX IP 258 OP 636: Performed by: RADIOLOGY

## 2021-01-01 PROCEDURE — 97162 PT EVAL MOD COMPLEX 30 MIN: CPT | Mod: GP

## 2021-01-01 PROCEDURE — 83735 ASSAY OF MAGNESIUM: CPT | Performed by: INTERNAL MEDICINE

## 2021-01-01 PROCEDURE — 82565 ASSAY OF CREATININE: CPT | Performed by: HOSPITALIST

## 2021-01-01 PROCEDURE — 99221 1ST HOSP IP/OBS SF/LOW 40: CPT | Performed by: SURGERY

## 2021-01-01 PROCEDURE — 82310 ASSAY OF CALCIUM: CPT | Performed by: FAMILY MEDICINE

## 2021-01-01 PROCEDURE — 83735 ASSAY OF MAGNESIUM: CPT | Performed by: FAMILY MEDICINE

## 2021-01-01 PROCEDURE — 74177 CT ABD & PELVIS W/CONTRAST: CPT | Mod: MG

## 2021-01-01 PROCEDURE — 85027 COMPLETE CBC AUTOMATED: CPT | Performed by: FAMILY MEDICINE

## 2021-01-01 PROCEDURE — 99223 1ST HOSP IP/OBS HIGH 75: CPT | Mod: AI | Performed by: HOSPITALIST

## 2021-01-01 PROCEDURE — 87076 CULTURE ANAEROBE IDENT EACH: CPT | Performed by: RADIOLOGY

## 2021-01-01 PROCEDURE — 97129 THER IVNTJ 1ST 15 MIN: CPT | Mod: GO

## 2021-01-01 PROCEDURE — 86900 BLOOD TYPING SEROLOGIC ABO: CPT | Performed by: ANESTHESIOLOGY

## 2021-01-01 PROCEDURE — 88305 TISSUE EXAM BY PATHOLOGIST: CPT | Mod: TC | Performed by: OBSTETRICS & GYNECOLOGY

## 2021-01-01 PROCEDURE — 87205 SMEAR GRAM STAIN: CPT | Performed by: FAMILY MEDICINE

## 2021-01-01 PROCEDURE — 36415 COLL VENOUS BLD VENIPUNCTURE: CPT | Performed by: RADIOLOGY

## 2021-01-01 PROCEDURE — 88307 TISSUE EXAM BY PATHOLOGIST: CPT | Mod: TC | Performed by: OBSTETRICS & GYNECOLOGY

## 2021-01-01 PROCEDURE — 85025 COMPLETE CBC W/AUTO DIFF WBC: CPT | Performed by: STUDENT IN AN ORGANIZED HEALTH CARE EDUCATION/TRAINING PROGRAM

## 2021-01-01 RX ORDER — POTASSIUM CHLORIDE 7.45 MG/ML
10 INJECTION INTRAVENOUS
Status: COMPLETED | OUTPATIENT
Start: 2021-01-01 | End: 2021-01-01

## 2021-01-01 RX ORDER — NALOXONE HYDROCHLORIDE 0.4 MG/ML
0.4 INJECTION, SOLUTION INTRAMUSCULAR; INTRAVENOUS; SUBCUTANEOUS
Status: CANCELLED | OUTPATIENT
Start: 2021-01-01

## 2021-01-01 RX ORDER — PIPERACILLIN SODIUM, TAZOBACTAM SODIUM 3; .375 G/15ML; G/15ML
3.38 INJECTION, POWDER, LYOPHILIZED, FOR SOLUTION INTRAVENOUS EVERY 8 HOURS
Status: DISCONTINUED | OUTPATIENT
Start: 2021-01-01 | End: 2021-01-01

## 2021-01-01 RX ORDER — CEFAZOLIN SODIUM 1 G/3ML
1 INJECTION, POWDER, FOR SOLUTION INTRAMUSCULAR; INTRAVENOUS SEE ADMIN INSTRUCTIONS
Status: DISCONTINUED | OUTPATIENT
Start: 2021-01-01 | End: 2021-01-01 | Stop reason: HOSPADM

## 2021-01-01 RX ORDER — CEFPODOXIME PROXETIL 200 MG/1
200 TABLET, FILM COATED ORAL 2 TIMES DAILY
Status: DISCONTINUED | OUTPATIENT
Start: 2021-01-01 | End: 2021-01-01

## 2021-01-01 RX ORDER — DILTIAZEM HYDROCHLORIDE 300 MG/1
300 CAPSULE, COATED, EXTENDED RELEASE ORAL DAILY
Status: DISCONTINUED | OUTPATIENT
Start: 2021-01-01 | End: 2021-01-01 | Stop reason: HOSPADM

## 2021-01-01 RX ORDER — NALOXONE HYDROCHLORIDE 0.4 MG/ML
0.4 INJECTION, SOLUTION INTRAMUSCULAR; INTRAVENOUS; SUBCUTANEOUS
Status: DISCONTINUED | OUTPATIENT
Start: 2021-01-01 | End: 2021-01-01 | Stop reason: HOSPADM

## 2021-01-01 RX ORDER — POTASSIUM CHLORIDE 20MEQ/15ML
20 LIQUID (ML) ORAL 3 TIMES DAILY
Status: DISCONTINUED | OUTPATIENT
Start: 2021-01-01 | End: 2021-01-01 | Stop reason: HOSPADM

## 2021-01-01 RX ORDER — METRONIDAZOLE 500 MG/1
500 TABLET ORAL 3 TIMES DAILY
Qty: 42 TABLET | Refills: 0 | Status: SHIPPED | OUTPATIENT
Start: 2021-01-01 | End: 2021-01-01

## 2021-01-01 RX ORDER — CEFAZOLIN SODIUM 2 G/50ML
2 SOLUTION INTRAVENOUS
Status: CANCELLED | OUTPATIENT
Start: 2021-01-01

## 2021-01-01 RX ORDER — NALOXONE HYDROCHLORIDE 0.4 MG/ML
0.2 INJECTION, SOLUTION INTRAMUSCULAR; INTRAVENOUS; SUBCUTANEOUS
Status: DISCONTINUED | OUTPATIENT
Start: 2021-01-01 | End: 2021-01-01 | Stop reason: HOSPADM

## 2021-01-01 RX ORDER — NALOXONE HYDROCHLORIDE 0.4 MG/ML
0.2 INJECTION, SOLUTION INTRAMUSCULAR; INTRAVENOUS; SUBCUTANEOUS
Status: CANCELLED | OUTPATIENT
Start: 2021-01-01

## 2021-01-01 RX ORDER — CEFAZOLIN SODIUM 2 G/100ML
2 INJECTION, SOLUTION INTRAVENOUS
Status: COMPLETED | OUTPATIENT
Start: 2021-01-01 | End: 2021-01-01

## 2021-01-01 RX ORDER — LIDOCAINE HYDROCHLORIDE 20 MG/ML
INJECTION, SOLUTION INFILTRATION; PERINEURAL PRN
Status: DISCONTINUED | OUTPATIENT
Start: 2021-01-01 | End: 2021-01-01

## 2021-01-01 RX ORDER — ONDANSETRON 4 MG/1
4 TABLET, ORALLY DISINTEGRATING ORAL EVERY 6 HOURS PRN
Status: DISCONTINUED | OUTPATIENT
Start: 2021-01-01 | End: 2021-01-01

## 2021-01-01 RX ORDER — AMIODARONE HYDROCHLORIDE 200 MG/1
200 TABLET ORAL 2 TIMES DAILY
Status: DISCONTINUED | OUTPATIENT
Start: 2021-01-01 | End: 2021-01-01 | Stop reason: HOSPADM

## 2021-01-01 RX ORDER — CARVEDILOL 3.12 MG/1
6.25 TABLET ORAL 2 TIMES DAILY WITH MEALS
Status: DISCONTINUED | OUTPATIENT
Start: 2021-01-01 | End: 2021-01-01 | Stop reason: HOSPADM

## 2021-01-01 RX ORDER — POTASSIUM CHLORIDE 1.5 G/1.58G
20 POWDER, FOR SOLUTION ORAL ONCE
Status: COMPLETED | OUTPATIENT
Start: 2021-01-01 | End: 2021-01-01

## 2021-01-01 RX ORDER — ACETAMINOPHEN 325 MG/1
650 TABLET ORAL EVERY 6 HOURS PRN
Status: DISCONTINUED | OUTPATIENT
Start: 2021-01-01 | End: 2021-01-01 | Stop reason: HOSPADM

## 2021-01-01 RX ORDER — POTASSIUM CHLORIDE 1500 MG/1
80 TABLET, EXTENDED RELEASE ORAL ONCE
Status: COMPLETED | OUTPATIENT
Start: 2021-01-01 | End: 2021-01-01

## 2021-01-01 RX ORDER — ONDANSETRON 4 MG/1
4 TABLET, ORALLY DISINTEGRATING ORAL EVERY 6 HOURS PRN
Start: 2021-01-01

## 2021-01-01 RX ORDER — LIDOCAINE 40 MG/G
CREAM TOPICAL
Status: DISCONTINUED | OUTPATIENT
Start: 2021-01-01 | End: 2021-01-01 | Stop reason: HOSPADM

## 2021-01-01 RX ORDER — ONDANSETRON 4 MG/1
4 TABLET, ORALLY DISINTEGRATING ORAL EVERY 6 HOURS PRN
Status: DISCONTINUED | OUTPATIENT
Start: 2021-01-01 | End: 2021-01-01 | Stop reason: HOSPADM

## 2021-01-01 RX ORDER — MAGNESIUM SULFATE HEPTAHYDRATE 40 MG/ML
2 INJECTION, SOLUTION INTRAVENOUS ONCE
Status: COMPLETED | OUTPATIENT
Start: 2021-01-01 | End: 2021-01-01

## 2021-01-01 RX ORDER — FENTANYL CITRATE 50 UG/ML
INJECTION, SOLUTION INTRAMUSCULAR; INTRAVENOUS PRN
Status: DISCONTINUED | OUTPATIENT
Start: 2021-01-01 | End: 2021-01-01

## 2021-01-01 RX ORDER — CARVEDILOL 6.25 MG/1
6.25 TABLET ORAL 2 TIMES DAILY WITH MEALS
COMMUNITY
Start: 2020-01-01

## 2021-01-01 RX ORDER — FENTANYL CITRATE 50 UG/ML
25-50 INJECTION, SOLUTION INTRAMUSCULAR; INTRAVENOUS EVERY 5 MIN PRN
Status: DISCONTINUED | OUTPATIENT
Start: 2021-01-01 | End: 2021-01-01

## 2021-01-01 RX ORDER — NALOXONE HYDROCHLORIDE 0.4 MG/ML
0.4 INJECTION, SOLUTION INTRAMUSCULAR; INTRAVENOUS; SUBCUTANEOUS
Status: DISCONTINUED | OUTPATIENT
Start: 2021-01-01 | End: 2021-01-01

## 2021-01-01 RX ORDER — IOPAMIDOL 755 MG/ML
100 INJECTION, SOLUTION INTRAVASCULAR ONCE
Status: COMPLETED | OUTPATIENT
Start: 2021-01-01 | End: 2021-01-01

## 2021-01-01 RX ORDER — SCOLOPAMINE TRANSDERMAL SYSTEM 1 MG/1
1 PATCH, EXTENDED RELEASE TRANSDERMAL
Status: DISCONTINUED | OUTPATIENT
Start: 2021-01-01 | End: 2021-01-01 | Stop reason: HOSPADM

## 2021-01-01 RX ORDER — DILTIAZEM HYDROCHLORIDE 300 MG/1
300 CAPSULE, COATED, EXTENDED RELEASE ORAL DAILY
Status: DISCONTINUED | OUTPATIENT
Start: 2021-01-01 | End: 2021-01-01

## 2021-01-01 RX ORDER — ONDANSETRON 2 MG/ML
4 INJECTION INTRAMUSCULAR; INTRAVENOUS EVERY 30 MIN PRN
Status: DISCONTINUED | OUTPATIENT
Start: 2021-01-01 | End: 2021-01-01 | Stop reason: HOSPADM

## 2021-01-01 RX ORDER — AMIODARONE HYDROCHLORIDE 200 MG/1
200 TABLET ORAL DAILY
Status: DISCONTINUED | OUTPATIENT
Start: 2021-01-01 | End: 2021-01-01 | Stop reason: HOSPADM

## 2021-01-01 RX ORDER — BUPIVACAINE HYDROCHLORIDE 2.5 MG/ML
INJECTION, SOLUTION EPIDURAL; INFILTRATION; INTRACAUDAL PRN
Status: DISCONTINUED | OUTPATIENT
Start: 2021-01-01 | End: 2021-01-01 | Stop reason: HOSPADM

## 2021-01-01 RX ORDER — LORAZEPAM 2 MG/ML
1 CONCENTRATE ORAL
Status: DISCONTINUED | OUTPATIENT
Start: 2021-01-01 | End: 2021-01-01 | Stop reason: HOSPADM

## 2021-01-01 RX ORDER — VALSARTAN 80 MG/1
80 TABLET ORAL DAILY
Status: CANCELLED | OUTPATIENT
Start: 2021-01-01

## 2021-01-01 RX ORDER — AMIODARONE HYDROCHLORIDE 200 MG/1
200 TABLET ORAL 2 TIMES DAILY
Qty: 60 TABLET | Refills: 0 | Status: SHIPPED | OUTPATIENT
Start: 2021-01-01

## 2021-01-01 RX ORDER — VALSARTAN 80 MG/1
80 TABLET ORAL EVERY EVENING
COMMUNITY

## 2021-01-01 RX ORDER — FLUMAZENIL 0.1 MG/ML
0.2 INJECTION, SOLUTION INTRAVENOUS
Status: CANCELLED | OUTPATIENT
Start: 2021-01-01

## 2021-01-01 RX ORDER — ACETAMINOPHEN 325 MG/1
650 TABLET ORAL ONCE
Status: DISCONTINUED | OUTPATIENT
Start: 2021-01-01 | End: 2021-01-01 | Stop reason: HOSPADM

## 2021-01-01 RX ORDER — ONDANSETRON 8 MG/1
8 TABLET, ORALLY DISINTEGRATING ORAL EVERY 8 HOURS SCHEDULED
Status: DISCONTINUED | OUTPATIENT
Start: 2021-01-01 | End: 2021-01-01 | Stop reason: HOSPADM

## 2021-01-01 RX ORDER — LANOLIN ALCOHOL/MO/W.PET/CERES
6 CREAM (GRAM) TOPICAL
Status: DISCONTINUED | OUTPATIENT
Start: 2021-01-01 | End: 2021-01-01 | Stop reason: HOSPADM

## 2021-01-01 RX ORDER — METOPROLOL TARTRATE 1 MG/ML
5 INJECTION, SOLUTION INTRAVENOUS EVERY 5 MIN PRN
Status: DISCONTINUED | OUTPATIENT
Start: 2021-01-01 | End: 2021-01-01 | Stop reason: HOSPADM

## 2021-01-01 RX ORDER — METOPROLOL TARTRATE 1 MG/ML
INJECTION, SOLUTION INTRAVENOUS
Status: COMPLETED
Start: 2021-01-01 | End: 2021-01-01

## 2021-01-01 RX ORDER — DOXYCYCLINE HYCLATE 20 MG
100 TABLET ORAL EVERY 12 HOURS SCHEDULED
Status: DISCONTINUED | OUTPATIENT
Start: 2021-01-01 | End: 2021-01-01

## 2021-01-01 RX ORDER — CARVEDILOL 6.25 MG/1
6.25 TABLET ORAL 2 TIMES DAILY WITH MEALS
Status: DISCONTINUED | OUTPATIENT
Start: 2021-01-01 | End: 2021-01-01 | Stop reason: HOSPADM

## 2021-01-01 RX ORDER — ONDANSETRON 2 MG/ML
4 INJECTION INTRAMUSCULAR; INTRAVENOUS EVERY 6 HOURS PRN
Status: DISCONTINUED | OUTPATIENT
Start: 2021-01-01 | End: 2021-01-01 | Stop reason: HOSPADM

## 2021-01-01 RX ORDER — HALOPERIDOL 2 MG/ML
1 SOLUTION ORAL
Status: DISCONTINUED | OUTPATIENT
Start: 2021-01-01 | End: 2021-01-01 | Stop reason: HOSPADM

## 2021-01-01 RX ORDER — OXYCODONE HYDROCHLORIDE 5 MG/1
5 TABLET ORAL EVERY 4 HOURS PRN
Status: DISCONTINUED | OUTPATIENT
Start: 2021-01-01 | End: 2021-01-01 | Stop reason: HOSPADM

## 2021-01-01 RX ORDER — METOPROLOL TARTRATE 1 MG/ML
5 INJECTION, SOLUTION INTRAVENOUS ONCE
Status: COMPLETED | OUTPATIENT
Start: 2021-01-01 | End: 2021-01-01

## 2021-01-01 RX ORDER — ACETAMINOPHEN 325 MG/1
650 TABLET ORAL ONCE
Status: COMPLETED | OUTPATIENT
Start: 2021-01-01 | End: 2021-01-01

## 2021-01-01 RX ORDER — VALSARTAN 80 MG/1
80 TABLET ORAL
COMMUNITY
Start: 2020-02-10 | End: 2021-01-01

## 2021-01-01 RX ORDER — POTASSIUM CHLORIDE 1500 MG/1
40 TABLET, EXTENDED RELEASE ORAL ONCE
Status: COMPLETED | OUTPATIENT
Start: 2021-01-01 | End: 2021-01-01

## 2021-01-01 RX ORDER — HALOPERIDOL 2 MG/ML
0.5 SOLUTION ORAL
Status: DISCONTINUED | OUTPATIENT
Start: 2021-01-01 | End: 2021-01-01

## 2021-01-01 RX ORDER — NALOXONE HYDROCHLORIDE 0.4 MG/ML
0.2 INJECTION, SOLUTION INTRAMUSCULAR; INTRAVENOUS; SUBCUTANEOUS
Status: DISCONTINUED | OUTPATIENT
Start: 2021-01-01 | End: 2021-01-01

## 2021-01-01 RX ORDER — PROCHLORPERAZINE MALEATE 5 MG
5 TABLET ORAL EVERY 6 HOURS PRN
Status: DISCONTINUED | OUTPATIENT
Start: 2021-01-01 | End: 2021-01-01 | Stop reason: HOSPADM

## 2021-01-01 RX ORDER — SODIUM CHLORIDE 9 MG/ML
INJECTION, SOLUTION INTRAVENOUS CONTINUOUS
Status: ACTIVE | OUTPATIENT
Start: 2021-01-01 | End: 2021-01-01

## 2021-01-01 RX ORDER — SODIUM CHLORIDE 9 MG/ML
INJECTION, SOLUTION INTRAVENOUS CONTINUOUS PRN
Status: COMPLETED | OUTPATIENT
Start: 2021-01-01 | End: 2021-01-01

## 2021-01-01 RX ORDER — PIPERACILLIN SODIUM, TAZOBACTAM SODIUM 3; .375 G/15ML; G/15ML
3.38 INJECTION, POWDER, LYOPHILIZED, FOR SOLUTION INTRAVENOUS EVERY 8 HOURS
Status: DISCONTINUED | OUTPATIENT
Start: 2021-01-01 | End: 2021-01-01 | Stop reason: HOSPADM

## 2021-01-01 RX ORDER — ACETAMINOPHEN 325 MG/1
650 TABLET ORAL EVERY 4 HOURS PRN
COMMUNITY
Start: 2021-01-01

## 2021-01-01 RX ORDER — METRONIDAZOLE 500 MG/1
500 TABLET ORAL 3 TIMES DAILY
Status: DISCONTINUED | OUTPATIENT
Start: 2021-01-01 | End: 2021-01-01

## 2021-01-01 RX ORDER — PIPERACILLIN SODIUM, TAZOBACTAM SODIUM 3; .375 G/15ML; G/15ML
3.38 INJECTION, POWDER, LYOPHILIZED, FOR SOLUTION INTRAVENOUS ONCE
Status: COMPLETED | OUTPATIENT
Start: 2021-01-01 | End: 2021-01-01

## 2021-01-01 RX ORDER — POTASSIUM CHLORIDE 20MEQ/15ML
20 LIQUID (ML) ORAL 3 TIMES DAILY
Start: 2021-01-01

## 2021-01-01 RX ORDER — POTASSIUM CHLORIDE 1.5 G/1.58G
40 POWDER, FOR SOLUTION ORAL ONCE
Status: DISCONTINUED | OUTPATIENT
Start: 2021-01-01 | End: 2021-01-01 | Stop reason: ALTCHOICE

## 2021-01-01 RX ORDER — SODIUM CHLORIDE, SODIUM LACTATE, POTASSIUM CHLORIDE, CALCIUM CHLORIDE 600; 310; 30; 20 MG/100ML; MG/100ML; MG/100ML; MG/100ML
INJECTION, SOLUTION INTRAVENOUS CONTINUOUS
Status: DISCONTINUED | OUTPATIENT
Start: 2021-01-01 | End: 2021-01-01 | Stop reason: HOSPADM

## 2021-01-01 RX ORDER — AMOXICILLIN 250 MG
1 CAPSULE ORAL 2 TIMES DAILY
Status: DISCONTINUED | OUTPATIENT
Start: 2021-01-01 | End: 2021-01-01 | Stop reason: HOSPADM

## 2021-01-01 RX ORDER — SODIUM CHLORIDE AND POTASSIUM CHLORIDE 150; 900 MG/100ML; MG/100ML
INJECTION, SOLUTION INTRAVENOUS CONTINUOUS
Status: DISCONTINUED | OUTPATIENT
Start: 2021-01-01 | End: 2021-01-01

## 2021-01-01 RX ORDER — OXYCODONE HYDROCHLORIDE 5 MG/1
5 TABLET ORAL EVERY 6 HOURS PRN
Qty: 6 TABLET | Refills: 0 | Status: SHIPPED | OUTPATIENT
Start: 2021-01-01 | End: 2021-01-01

## 2021-01-01 RX ORDER — VALSARTAN 80 MG/1
80 TABLET ORAL EVERY EVENING
Status: DISCONTINUED | OUTPATIENT
Start: 2021-01-01 | End: 2021-01-01 | Stop reason: HOSPADM

## 2021-01-01 RX ORDER — CEFTRIAXONE 1 G/1
1 INJECTION, POWDER, FOR SOLUTION INTRAMUSCULAR; INTRAVENOUS ONCE
Status: COMPLETED | OUTPATIENT
Start: 2021-01-01 | End: 2021-01-01

## 2021-01-01 RX ORDER — ACETAMINOPHEN 325 MG/1
650 TABLET ORAL EVERY 4 HOURS PRN
Status: DISCONTINUED | OUTPATIENT
Start: 2021-01-01 | End: 2021-01-01 | Stop reason: HOSPADM

## 2021-01-01 RX ORDER — OXYCODONE HYDROCHLORIDE 5 MG/1
5 TABLET ORAL EVERY 6 HOURS PRN
Status: DISCONTINUED | OUTPATIENT
Start: 2021-01-01 | End: 2021-01-01 | Stop reason: HOSPADM

## 2021-01-01 RX ORDER — HYDROMORPHONE HYDROCHLORIDE 1 MG/ML
.3-.5 INJECTION, SOLUTION INTRAMUSCULAR; INTRAVENOUS; SUBCUTANEOUS EVERY 5 MIN PRN
Status: DISCONTINUED | OUTPATIENT
Start: 2021-01-01 | End: 2021-01-01 | Stop reason: HOSPADM

## 2021-01-01 RX ORDER — CARVEDILOL 6.25 MG/1
6.25 TABLET ORAL 2 TIMES DAILY WITH MEALS
Status: DISCONTINUED | OUTPATIENT
Start: 2021-01-01 | End: 2021-01-01

## 2021-01-01 RX ORDER — ASPIRIN 81 MG/1
81 TABLET ORAL DAILY
Status: DISCONTINUED | OUTPATIENT
Start: 2021-01-01 | End: 2021-01-01 | Stop reason: HOSPADM

## 2021-01-01 RX ORDER — DEXAMETHASONE SODIUM PHOSPHATE 4 MG/ML
INJECTION, SOLUTION INTRA-ARTICULAR; INTRALESIONAL; INTRAMUSCULAR; INTRAVENOUS; SOFT TISSUE PRN
Status: DISCONTINUED | OUTPATIENT
Start: 2021-01-01 | End: 2021-01-01

## 2021-01-01 RX ORDER — POTASSIUM CHLORIDE 1500 MG/1
20 TABLET, EXTENDED RELEASE ORAL ONCE
Status: COMPLETED | OUTPATIENT
Start: 2021-01-01 | End: 2021-01-01

## 2021-01-01 RX ORDER — LIDOCAINE 40 MG/G
CREAM TOPICAL
Status: DISCONTINUED | OUTPATIENT
Start: 2021-01-01 | End: 2021-01-01

## 2021-01-01 RX ORDER — POTASSIUM CHLORIDE 29.8 MG/ML
20 INJECTION INTRAVENOUS ONCE
Status: DISCONTINUED | OUTPATIENT
Start: 2021-01-01 | End: 2021-01-01 | Stop reason: CLARIF

## 2021-01-01 RX ORDER — FENTANYL CITRATE 50 UG/ML
25-50 INJECTION, SOLUTION INTRAMUSCULAR; INTRAVENOUS EVERY 5 MIN PRN
Status: CANCELLED | OUTPATIENT
Start: 2021-01-01

## 2021-01-01 RX ORDER — DILTIAZEM HYDROCHLORIDE 120 MG/1
120 CAPSULE, COATED, EXTENDED RELEASE ORAL DAILY
Status: DISCONTINUED | OUTPATIENT
Start: 2021-01-01 | End: 2021-01-01 | Stop reason: HOSPADM

## 2021-01-01 RX ORDER — ONDANSETRON 4 MG/1
4 TABLET, ORALLY DISINTEGRATING ORAL EVERY 30 MIN PRN
Status: DISCONTINUED | OUTPATIENT
Start: 2021-01-01 | End: 2021-01-01 | Stop reason: HOSPADM

## 2021-01-01 RX ORDER — BISACODYL 10 MG
10 SUPPOSITORY, RECTAL RECTAL DAILY PRN
Status: DISCONTINUED | OUTPATIENT
Start: 2021-01-01 | End: 2021-01-01 | Stop reason: HOSPADM

## 2021-01-01 RX ORDER — ATROPINE SULFATE 10 MG/ML
2 SOLUTION/ DROPS OPHTHALMIC
Status: DISCONTINUED | OUTPATIENT
Start: 2021-01-01 | End: 2021-01-01 | Stop reason: HOSPADM

## 2021-01-01 RX ORDER — FLUMAZENIL 0.1 MG/ML
0.2 INJECTION, SOLUTION INTRAVENOUS
Status: DISCONTINUED | OUTPATIENT
Start: 2021-01-01 | End: 2021-01-01 | Stop reason: HOSPADM

## 2021-01-01 RX ORDER — METRONIDAZOLE 500 MG/1
500 TABLET ORAL 3 TIMES DAILY
Qty: 14 TABLET | Refills: 0 | Status: SHIPPED | OUTPATIENT
Start: 2021-01-01 | End: 2021-01-01

## 2021-01-01 RX ORDER — LORAZEPAM 2 MG/ML
0.5 CONCENTRATE ORAL
Status: DISCONTINUED | OUTPATIENT
Start: 2021-01-01 | End: 2021-01-01

## 2021-01-01 RX ORDER — ONDANSETRON 2 MG/ML
INJECTION INTRAMUSCULAR; INTRAVENOUS PRN
Status: DISCONTINUED | OUTPATIENT
Start: 2021-01-01 | End: 2021-01-01

## 2021-01-01 RX ORDER — DOXYCYCLINE HYCLATE 20 MG
20 TABLET ORAL EVERY 12 HOURS SCHEDULED
Status: DISCONTINUED | OUTPATIENT
Start: 2021-01-01 | End: 2021-01-01

## 2021-01-01 RX ORDER — SODIUM CHLORIDE, SODIUM GLUCONATE, SODIUM ACETATE, POTASSIUM CHLORIDE AND MAGNESIUM CHLORIDE 526; 502; 368; 37; 30 MG/100ML; MG/100ML; MG/100ML; MG/100ML; MG/100ML
INJECTION, SOLUTION INTRAVENOUS CONTINUOUS PRN
Status: DISCONTINUED | OUTPATIENT
Start: 2021-01-01 | End: 2021-01-01

## 2021-01-01 RX ORDER — FENTANYL CITRATE 50 UG/ML
INJECTION, SOLUTION INTRAMUSCULAR; INTRAVENOUS PRN
Status: COMPLETED | OUTPATIENT
Start: 2021-01-01 | End: 2021-01-01

## 2021-01-01 RX ORDER — FENTANYL CITRATE 50 UG/ML
25-50 INJECTION, SOLUTION INTRAMUSCULAR; INTRAVENOUS
Status: DISCONTINUED | OUTPATIENT
Start: 2021-01-01 | End: 2021-01-01 | Stop reason: HOSPADM

## 2021-01-01 RX ORDER — LEVOFLOXACIN 500 MG/1
500 TABLET, FILM COATED ORAL DAILY
Qty: 14 TABLET | Refills: 0 | Status: SHIPPED | OUTPATIENT
Start: 2021-01-01 | End: 2021-01-01

## 2021-01-01 RX ORDER — HEPARIN SODIUM 10000 [USP'U]/100ML
0-5000 INJECTION, SOLUTION INTRAVENOUS CONTINUOUS
Status: DISCONTINUED | OUTPATIENT
Start: 2021-01-01 | End: 2021-01-01

## 2021-01-01 RX ORDER — CEFAZOLIN SODIUM 1 G/50ML
1 INJECTION, SOLUTION INTRAVENOUS SEE ADMIN INSTRUCTIONS
Status: CANCELLED | OUTPATIENT
Start: 2021-01-01

## 2021-01-01 RX ORDER — PROPOFOL 10 MG/ML
INJECTION, EMULSION INTRAVENOUS PRN
Status: DISCONTINUED | OUTPATIENT
Start: 2021-01-01 | End: 2021-01-01

## 2021-01-01 RX ORDER — POLYETHYLENE GLYCOL 3350 17 G/17G
17 POWDER, FOR SOLUTION ORAL DAILY PRN
Status: DISCONTINUED | OUTPATIENT
Start: 2021-01-01 | End: 2021-01-01 | Stop reason: HOSPADM

## 2021-01-01 RX ORDER — PRAVASTATIN SODIUM 20 MG
20 TABLET ORAL DAILY
Status: DISCONTINUED | OUTPATIENT
Start: 2021-01-01 | End: 2021-01-01 | Stop reason: HOSPADM

## 2021-01-01 RX ORDER — DILTIAZEM HYDROCHLORIDE 180 MG/1
180 CAPSULE, COATED, EXTENDED RELEASE ORAL DAILY
Status: DISCONTINUED | OUTPATIENT
Start: 2021-01-01 | End: 2021-01-01 | Stop reason: HOSPADM

## 2021-01-01 RX ORDER — POTASSIUM CHLORIDE 1500 MG/1
20 TABLET, EXTENDED RELEASE ORAL 3 TIMES DAILY
Status: DISCONTINUED | OUTPATIENT
Start: 2021-01-01 | End: 2021-01-01 | Stop reason: CLARIF

## 2021-01-01 RX ORDER — VANCOMYCIN HYDROCHLORIDE 125 MG/1
125 CAPSULE ORAL 2 TIMES DAILY
Status: DISCONTINUED | OUTPATIENT
Start: 2021-01-01 | End: 2021-01-01

## 2021-01-01 RX ORDER — FLUMAZENIL 0.1 MG/ML
0.2 INJECTION, SOLUTION INTRAVENOUS
Status: DISCONTINUED | OUTPATIENT
Start: 2021-01-01 | End: 2021-01-01

## 2021-01-01 RX ORDER — POLYETHYLENE GLYCOL 3350 17 G/17G
17 POWDER, FOR SOLUTION ORAL DAILY
Qty: 510 G | COMMUNITY
Start: 2021-01-01

## 2021-01-01 RX ORDER — CEFTRIAXONE 1 G/1
1 INJECTION, POWDER, FOR SOLUTION INTRAMUSCULAR; INTRAVENOUS EVERY 24 HOURS
Status: DISCONTINUED | OUTPATIENT
Start: 2021-01-01 | End: 2021-01-01

## 2021-01-01 RX ADMIN — DILTIAZEM HYDROCHLORIDE 120 MG: 120 CAPSULE, COATED, EXTENDED RELEASE ORAL at 09:29

## 2021-01-01 RX ADMIN — POTASSIUM CHLORIDE 10 MEQ: 10 INJECTION, SOLUTION INTRAVENOUS at 11:32

## 2021-01-01 RX ADMIN — VALSARTAN 80 MG: 80 TABLET, FILM COATED ORAL at 22:19

## 2021-01-01 RX ADMIN — DILTIAZEM HYDROCHLORIDE 300 MG: 300 CAPSULE, COATED, EXTENDED RELEASE ORAL at 11:54

## 2021-01-01 RX ADMIN — APIXABAN 2.5 MG: 2.5 TABLET, FILM COATED ORAL at 09:40

## 2021-01-01 RX ADMIN — VALSARTAN 80 MG: 80 TABLET, FILM COATED ORAL at 20:43

## 2021-01-01 RX ADMIN — POTASSIUM CHLORIDE 10 MEQ: 10 INJECTION, SOLUTION INTRAVENOUS at 09:17

## 2021-01-01 RX ADMIN — CARVEDILOL 6.25 MG: 6.25 TABLET, FILM COATED ORAL at 19:16

## 2021-01-01 RX ADMIN — CEFPODOXIME PROXETIL 200 MG: 200 TABLET, FILM COATED ORAL at 10:00

## 2021-01-01 RX ADMIN — AMIODARONE HYDROCHLORIDE 200 MG: 200 TABLET ORAL at 10:10

## 2021-01-01 RX ADMIN — DILTIAZEM HYDROCHLORIDE 180 MG: 180 CAPSULE, EXTENDED RELEASE ORAL at 09:31

## 2021-01-01 RX ADMIN — LORAZEPAM 0.5 MG: 2 CONCENTRATE ORAL at 14:27

## 2021-01-01 RX ADMIN — APIXABAN 2.5 MG: 2.5 TABLET, FILM COATED ORAL at 20:34

## 2021-01-01 RX ADMIN — HALOPERIDOL 1 MG: 2 SOLUTION ORAL at 00:11

## 2021-01-01 RX ADMIN — ONDANSETRON 8 MG: 8 TABLET, ORALLY DISINTEGRATING ORAL at 08:05

## 2021-01-01 RX ADMIN — PIPERACILLIN AND TAZOBACTAM 3.38 G: 3; .375 INJECTION, POWDER, LYOPHILIZED, FOR SOLUTION INTRAVENOUS at 05:40

## 2021-01-01 RX ADMIN — ASPIRIN 81 MG: 81 TABLET, DELAYED RELEASE ORAL at 09:17

## 2021-01-01 RX ADMIN — SODIUM CHLORIDE 1000 ML: 9 INJECTION, SOLUTION INTRAVENOUS at 16:03

## 2021-01-01 RX ADMIN — PIPERACILLIN AND TAZOBACTAM 3.38 G: 3; .375 INJECTION, POWDER, LYOPHILIZED, FOR SOLUTION INTRAVENOUS at 14:05

## 2021-01-01 RX ADMIN — POTASSIUM CHLORIDE 10 MEQ: 10 INJECTION, SOLUTION INTRAVENOUS at 13:22

## 2021-01-01 RX ADMIN — MICAFUNGIN SODIUM 100 MG: 50 INJECTION, POWDER, LYOPHILIZED, FOR SOLUTION INTRAVENOUS at 20:40

## 2021-01-01 RX ADMIN — PIPERACILLIN AND TAZOBACTAM 3.38 G: 3; .375 INJECTION, POWDER, LYOPHILIZED, FOR SOLUTION INTRAVENOUS at 22:37

## 2021-01-01 RX ADMIN — POTASSIUM CHLORIDE 10 MEQ: 7.46 INJECTION, SOLUTION INTRAVENOUS at 21:04

## 2021-01-01 RX ADMIN — POTASSIUM CHLORIDE 10 MEQ: 10 INJECTION, SOLUTION INTRAVENOUS at 09:32

## 2021-01-01 RX ADMIN — DILTIAZEM HYDROCHLORIDE 120 MG: 120 CAPSULE, COATED, EXTENDED RELEASE ORAL at 08:26

## 2021-01-01 RX ADMIN — APIXABAN 2.5 MG: 2.5 TABLET, FILM COATED ORAL at 09:30

## 2021-01-01 RX ADMIN — CARVEDILOL 6.25 MG: 6.25 TABLET, FILM COATED ORAL at 09:30

## 2021-01-01 RX ADMIN — POTASSIUM CHLORIDE 20 MEQ: 20 SOLUTION ORAL at 20:40

## 2021-01-01 RX ADMIN — POTASSIUM CHLORIDE AND SODIUM CHLORIDE: 900; 150 INJECTION, SOLUTION INTRAVENOUS at 06:53

## 2021-01-01 RX ADMIN — DILTIAZEM HYDROCHLORIDE 120 MG: 120 CAPSULE, COATED, EXTENDED RELEASE ORAL at 10:07

## 2021-01-01 RX ADMIN — AMIODARONE HYDROCHLORIDE 200 MG: 200 TABLET ORAL at 20:26

## 2021-01-01 RX ADMIN — PIPERACILLIN AND TAZOBACTAM 3.38 G: 3; .375 INJECTION, POWDER, LYOPHILIZED, FOR SOLUTION INTRAVENOUS at 22:05

## 2021-01-01 RX ADMIN — APIXABAN 5 MG: 5 TABLET, FILM COATED ORAL at 08:26

## 2021-01-01 RX ADMIN — AMIODARONE HYDROCHLORIDE 150 MG: 1.5 INJECTION, SOLUTION INTRAVENOUS at 14:57

## 2021-01-01 RX ADMIN — POTASSIUM CHLORIDE 20 MEQ: 1500 TABLET, EXTENDED RELEASE ORAL at 15:43

## 2021-01-01 RX ADMIN — POTASSIUM CHLORIDE 20 MEQ: 1500 TABLET, EXTENDED RELEASE ORAL at 09:19

## 2021-01-01 RX ADMIN — AMIODARONE HYDROCHLORIDE 200 MG: 200 TABLET ORAL at 21:55

## 2021-01-01 RX ADMIN — LORAZEPAM 0.5 MG: 2 CONCENTRATE ORAL at 01:49

## 2021-01-01 RX ADMIN — METRONIDAZOLE 500 MG: 500 TABLET ORAL at 20:05

## 2021-01-01 RX ADMIN — PIPERACILLIN AND TAZOBACTAM 3.38 G: 3; .375 INJECTION, POWDER, LYOPHILIZED, FOR SOLUTION INTRAVENOUS at 20:47

## 2021-01-01 RX ADMIN — AMIODARONE HYDROCHLORIDE 200 MG: 200 TABLET ORAL at 09:09

## 2021-01-01 RX ADMIN — POTASSIUM CHLORIDE 20 MEQ: 1500 TABLET, EXTENDED RELEASE ORAL at 20:34

## 2021-01-01 RX ADMIN — CARVEDILOL 6.25 MG: 3.12 TABLET, FILM COATED ORAL at 08:50

## 2021-01-01 RX ADMIN — AMIODARONE HYDROCHLORIDE 200 MG: 200 TABLET ORAL at 10:00

## 2021-01-01 RX ADMIN — LORAZEPAM 0.5 MG: 2 CONCENTRATE ORAL at 15:46

## 2021-01-01 RX ADMIN — DILTIAZEM HYDROCHLORIDE 120 MG: 120 CAPSULE, COATED, EXTENDED RELEASE ORAL at 09:32

## 2021-01-01 RX ADMIN — ASPIRIN 81 MG: 81 TABLET, DELAYED RELEASE ORAL at 09:18

## 2021-01-01 RX ADMIN — DEXAMETHASONE SODIUM PHOSPHATE 8 MG: 4 INJECTION, SOLUTION INTRA-ARTICULAR; INTRALESIONAL; INTRAMUSCULAR; INTRAVENOUS; SOFT TISSUE at 14:48

## 2021-01-01 RX ADMIN — Medication 10 MG: at 19:21

## 2021-01-01 RX ADMIN — POTASSIUM CHLORIDE 10 MEQ: 10 INJECTION, SOLUTION INTRAVENOUS at 10:35

## 2021-01-01 RX ADMIN — POTASSIUM CHLORIDE 10 MEQ: 10 INJECTION, SOLUTION INTRAVENOUS at 10:49

## 2021-01-01 RX ADMIN — ONDANSETRON 8 MG: 8 TABLET, ORALLY DISINTEGRATING ORAL at 06:08

## 2021-01-01 RX ADMIN — AMIODARONE HYDROCHLORIDE 200 MG: 200 TABLET ORAL at 09:18

## 2021-01-01 RX ADMIN — CARVEDILOL 6.25 MG: 6.25 TABLET, FILM COATED ORAL at 09:17

## 2021-01-01 RX ADMIN — LORAZEPAM 0.5 MG: 2 CONCENTRATE ORAL at 19:26

## 2021-01-01 RX ADMIN — ONDANSETRON 8 MG: 8 TABLET, ORALLY DISINTEGRATING ORAL at 21:11

## 2021-01-01 RX ADMIN — IOPAMIDOL 100 ML: 755 INJECTION, SOLUTION INTRAVENOUS at 09:37

## 2021-01-01 RX ADMIN — PIPERACILLIN AND TAZOBACTAM 3.38 G: 3; .375 INJECTION, POWDER, LYOPHILIZED, FOR SOLUTION INTRAVENOUS at 13:05

## 2021-01-01 RX ADMIN — CARVEDILOL 6.25 MG: 6.25 TABLET, FILM COATED ORAL at 18:23

## 2021-01-01 RX ADMIN — AMIODARONE HYDROCHLORIDE 200 MG: 200 TABLET ORAL at 21:12

## 2021-01-01 RX ADMIN — METRONIDAZOLE 500 MG: 500 TABLET ORAL at 20:38

## 2021-01-01 RX ADMIN — VALSARTAN 80 MG: 80 TABLET, FILM COATED ORAL at 21:20

## 2021-01-01 RX ADMIN — POTASSIUM CHLORIDE 10 MEQ: 7.46 INJECTION, SOLUTION INTRAVENOUS at 02:32

## 2021-01-01 RX ADMIN — CARVEDILOL 6.25 MG: 6.25 TABLET, FILM COATED ORAL at 09:32

## 2021-01-01 RX ADMIN — ONDANSETRON 4 MG: 4 TABLET, ORALLY DISINTEGRATING ORAL at 06:43

## 2021-01-01 RX ADMIN — POTASSIUM CHLORIDE 20 MEQ: 20 SOLUTION ORAL at 15:33

## 2021-01-01 RX ADMIN — APIXABAN 5 MG: 5 TABLET, FILM COATED ORAL at 20:25

## 2021-01-01 RX ADMIN — DILTIAZEM HYDROCHLORIDE 180 MG: 180 CAPSULE, EXTENDED RELEASE ORAL at 09:17

## 2021-01-01 RX ADMIN — ROCURONIUM BROMIDE 10 MG: 10 INJECTION INTRAVENOUS at 13:59

## 2021-01-01 RX ADMIN — PIPERACILLIN AND TAZOBACTAM 3.38 G: 3; .375 INJECTION, POWDER, LYOPHILIZED, FOR SOLUTION INTRAVENOUS at 06:32

## 2021-01-01 RX ADMIN — ASPIRIN 81 MG: 81 TABLET, DELAYED RELEASE ORAL at 09:08

## 2021-01-01 RX ADMIN — ASPIRIN 81 MG: 81 TABLET, DELAYED RELEASE ORAL at 21:44

## 2021-01-01 RX ADMIN — AMIODARONE HYDROCHLORIDE 200 MG: 200 TABLET ORAL at 20:46

## 2021-01-01 RX ADMIN — CARVEDILOL 6.25 MG: 6.25 TABLET, FILM COATED ORAL at 09:09

## 2021-01-01 RX ADMIN — SODIUM CHLORIDE: 9 INJECTION, SOLUTION INTRAVENOUS at 15:00

## 2021-01-01 RX ADMIN — MICAFUNGIN SODIUM 100 MG: 50 INJECTION, POWDER, LYOPHILIZED, FOR SOLUTION INTRAVENOUS at 21:13

## 2021-01-01 RX ADMIN — HALOPERIDOL 0.5 MG: 2 SOLUTION ORAL at 17:15

## 2021-01-01 RX ADMIN — PIPERACILLIN AND TAZOBACTAM 3.38 G: 3; .375 INJECTION, POWDER, LYOPHILIZED, FOR SOLUTION INTRAVENOUS at 23:23

## 2021-01-01 RX ADMIN — DILTIAZEM HYDROCHLORIDE 120 MG: 120 CAPSULE, COATED, EXTENDED RELEASE ORAL at 10:15

## 2021-01-01 RX ADMIN — CARVEDILOL 6.25 MG: 6.25 TABLET, FILM COATED ORAL at 18:47

## 2021-01-01 RX ADMIN — POTASSIUM CHLORIDE 20 MEQ: 1500 TABLET, EXTENDED RELEASE ORAL at 15:13

## 2021-01-01 RX ADMIN — VANCOMYCIN HYDROCHLORIDE 1000 MG: 5 INJECTION, POWDER, LYOPHILIZED, FOR SOLUTION INTRAVENOUS at 03:10

## 2021-01-01 RX ADMIN — DILTIAZEM HYDROCHLORIDE 120 MG: 120 CAPSULE, COATED, EXTENDED RELEASE ORAL at 09:09

## 2021-01-01 RX ADMIN — DILTIAZEM HYDROCHLORIDE 180 MG: 180 CAPSULE, EXTENDED RELEASE ORAL at 08:26

## 2021-01-01 RX ADMIN — PIPERACILLIN AND TAZOBACTAM 3.38 G: 3; .375 INJECTION, POWDER, LYOPHILIZED, FOR SOLUTION INTRAVENOUS at 14:29

## 2021-01-01 RX ADMIN — METOPROLOL TARTRATE 5 MG: 1 INJECTION, SOLUTION INTRAVENOUS at 07:08

## 2021-01-01 RX ADMIN — AMIODARONE HYDROCHLORIDE 200 MG: 200 TABLET ORAL at 22:19

## 2021-01-01 RX ADMIN — CARVEDILOL 6.25 MG: 6.25 TABLET, FILM COATED ORAL at 09:31

## 2021-01-01 RX ADMIN — PIPERACILLIN AND TAZOBACTAM 3.38 G: 3; .375 INJECTION, POWDER, LYOPHILIZED, FOR SOLUTION INTRAVENOUS at 14:28

## 2021-01-01 RX ADMIN — APIXABAN 2.5 MG: 5 TABLET, FILM COATED ORAL at 11:42

## 2021-01-01 RX ADMIN — PIPERACILLIN AND TAZOBACTAM 3.38 G: 3; .375 INJECTION, POWDER, LYOPHILIZED, FOR SOLUTION INTRAVENOUS at 16:28

## 2021-01-01 RX ADMIN — AMIODARONE HYDROCHLORIDE 200 MG: 200 TABLET ORAL at 21:18

## 2021-01-01 RX ADMIN — CARVEDILOL 6.25 MG: 6.25 TABLET, FILM COATED ORAL at 18:20

## 2021-01-01 RX ADMIN — CARVEDILOL 6.25 MG: 6.25 TABLET, FILM COATED ORAL at 10:07

## 2021-01-01 RX ADMIN — VANCOMYCIN HYDROCHLORIDE 125 MG: 125 CAPSULE ORAL at 20:38

## 2021-01-01 RX ADMIN — VALSARTAN 80 MG: 80 TABLET, FILM COATED ORAL at 20:46

## 2021-01-01 RX ADMIN — PIPERACILLIN AND TAZOBACTAM 3.38 G: 3; .375 INJECTION, POWDER, LYOPHILIZED, FOR SOLUTION INTRAVENOUS at 04:20

## 2021-01-01 RX ADMIN — SODIUM CHLORIDE 500 ML: 9 INJECTION, SOLUTION INTRAVENOUS at 07:00

## 2021-01-01 RX ADMIN — LORAZEPAM 0.5 MG: 2 CONCENTRATE ORAL at 04:11

## 2021-01-01 RX ADMIN — POTASSIUM CHLORIDE 20 MEQ: 20 SOLUTION ORAL at 09:08

## 2021-01-01 RX ADMIN — DILTIAZEM HYDROCHLORIDE 180 MG: 180 CAPSULE, EXTENDED RELEASE ORAL at 11:06

## 2021-01-01 RX ADMIN — AMIODARONE HYDROCHLORIDE 200 MG: 200 TABLET ORAL at 20:32

## 2021-01-01 RX ADMIN — PIPERACILLIN AND TAZOBACTAM 3.38 G: 3; .375 INJECTION, POWDER, LYOPHILIZED, FOR SOLUTION INTRAVENOUS at 20:34

## 2021-01-01 RX ADMIN — VALSARTAN 80 MG: 80 TABLET, FILM COATED ORAL at 22:06

## 2021-01-01 RX ADMIN — PIPERACILLIN AND TAZOBACTAM 3.38 G: 3; .375 INJECTION, POWDER, LYOPHILIZED, FOR SOLUTION INTRAVENOUS at 22:19

## 2021-01-01 RX ADMIN — VANCOMYCIN HYDROCHLORIDE 125 MG: 125 CAPSULE ORAL at 20:05

## 2021-01-01 RX ADMIN — VALSARTAN 80 MG: 80 TABLET, FILM COATED ORAL at 21:55

## 2021-01-01 RX ADMIN — PIPERACILLIN AND TAZOBACTAM 3.38 G: 3; .375 INJECTION, POWDER, LYOPHILIZED, FOR SOLUTION INTRAVENOUS at 04:40

## 2021-01-01 RX ADMIN — ONDANSETRON 4 MG: 4 TABLET, ORALLY DISINTEGRATING ORAL at 09:25

## 2021-01-01 RX ADMIN — APIXABAN 5 MG: 5 TABLET, FILM COATED ORAL at 22:38

## 2021-01-01 RX ADMIN — PIPERACILLIN AND TAZOBACTAM 3.38 G: 3; .375 INJECTION, POWDER, LYOPHILIZED, FOR SOLUTION INTRAVENOUS at 21:12

## 2021-01-01 RX ADMIN — AMIODARONE HYDROCHLORIDE 200 MG: 200 TABLET ORAL at 08:26

## 2021-01-01 RX ADMIN — ASPIRIN 81 MG: 81 TABLET, DELAYED RELEASE ORAL at 08:26

## 2021-01-01 RX ADMIN — POTASSIUM CHLORIDE 20 MEQ: 1500 TABLET, EXTENDED RELEASE ORAL at 22:19

## 2021-01-01 RX ADMIN — CEFAZOLIN 2 G: 10 INJECTION, POWDER, FOR SOLUTION INTRAVENOUS at 13:27

## 2021-01-01 RX ADMIN — ASPIRIN 81 MG: 81 TABLET, DELAYED RELEASE ORAL at 13:17

## 2021-01-01 RX ADMIN — ASPIRIN 81 MG: 81 TABLET, DELAYED RELEASE ORAL at 10:13

## 2021-01-01 RX ADMIN — CARVEDILOL 6.25 MG: 6.25 TABLET, FILM COATED ORAL at 17:30

## 2021-01-01 RX ADMIN — CARVEDILOL 6.25 MG: 6.25 TABLET, FILM COATED ORAL at 18:32

## 2021-01-01 RX ADMIN — PIPERACILLIN AND TAZOBACTAM 3.38 G: 3; .375 INJECTION, POWDER, LYOPHILIZED, FOR SOLUTION INTRAVENOUS at 14:59

## 2021-01-01 RX ADMIN — LORAZEPAM 0.5 MG: 2 CONCENTRATE ORAL at 23:50

## 2021-01-01 RX ADMIN — SODIUM CHLORIDE, SODIUM GLUCONATE, SODIUM ACETATE, POTASSIUM CHLORIDE AND MAGNESIUM CHLORIDE: 526; 502; 368; 37; 30 INJECTION, SOLUTION INTRAVENOUS at 12:59

## 2021-01-01 RX ADMIN — MICAFUNGIN SODIUM 100 MG: 50 INJECTION, POWDER, LYOPHILIZED, FOR SOLUTION INTRAVENOUS at 19:16

## 2021-01-01 RX ADMIN — POTASSIUM CHLORIDE 20 MEQ: 1500 TABLET, EXTENDED RELEASE ORAL at 13:01

## 2021-01-01 RX ADMIN — PIPERACILLIN AND TAZOBACTAM 3.38 G: 3; .375 INJECTION, POWDER, LYOPHILIZED, FOR SOLUTION INTRAVENOUS at 07:05

## 2021-01-01 RX ADMIN — ONDANSETRON 4 MG: 4 TABLET, ORALLY DISINTEGRATING ORAL at 00:15

## 2021-01-01 RX ADMIN — FENTANYL CITRATE 125 MCG: 50 INJECTION, SOLUTION INTRAMUSCULAR; INTRAVENOUS at 13:05

## 2021-01-01 RX ADMIN — PIPERACILLIN AND TAZOBACTAM 3.38 G: 3; .375 INJECTION, POWDER, LYOPHILIZED, FOR SOLUTION INTRAVENOUS at 13:32

## 2021-01-01 RX ADMIN — CARVEDILOL 6.25 MG: 6.25 TABLET, FILM COATED ORAL at 10:11

## 2021-01-01 RX ADMIN — POTASSIUM CHLORIDE 20 MEQ: 1500 TABLET, EXTENDED RELEASE ORAL at 20:46

## 2021-01-01 RX ADMIN — ONDANSETRON 8 MG: 8 TABLET, ORALLY DISINTEGRATING ORAL at 22:14

## 2021-01-01 RX ADMIN — ACETAMINOPHEN 650 MG: 325 TABLET ORAL at 15:35

## 2021-01-01 RX ADMIN — PIPERACILLIN AND TAZOBACTAM 3.38 G: 3; .375 INJECTION, POWDER, LYOPHILIZED, FOR SOLUTION INTRAVENOUS at 21:55

## 2021-01-01 RX ADMIN — POTASSIUM CHLORIDE 20 MEQ: 1500 TABLET, EXTENDED RELEASE ORAL at 20:32

## 2021-01-01 RX ADMIN — POTASSIUM CHLORIDE 10 MEQ: 10 INJECTION, SOLUTION INTRAVENOUS at 09:25

## 2021-01-01 RX ADMIN — PIPERACILLIN AND TAZOBACTAM 3.38 G: 3; .375 INJECTION, POWDER, LYOPHILIZED, FOR SOLUTION INTRAVENOUS at 15:09

## 2021-01-01 RX ADMIN — VALSARTAN 80 MG: 80 TABLET, FILM COATED ORAL at 20:34

## 2021-01-01 RX ADMIN — POTASSIUM CHLORIDE 40 MEQ: 1500 TABLET, EXTENDED RELEASE ORAL at 10:51

## 2021-01-01 RX ADMIN — ONDANSETRON 8 MG: 8 TABLET, ORALLY DISINTEGRATING ORAL at 14:00

## 2021-01-01 RX ADMIN — POTASSIUM CHLORIDE 20 MEQ: 1500 TABLET, EXTENDED RELEASE ORAL at 22:38

## 2021-01-01 RX ADMIN — PIPERACILLIN AND TAZOBACTAM 3.38 G: 3; .375 INJECTION, POWDER, LYOPHILIZED, FOR SOLUTION INTRAVENOUS at 04:53

## 2021-01-01 RX ADMIN — POTASSIUM CHLORIDE 20 MEQ: 20 SOLUTION ORAL at 16:27

## 2021-01-01 RX ADMIN — CARVEDILOL 6.25 MG: 6.25 TABLET, FILM COATED ORAL at 17:07

## 2021-01-01 RX ADMIN — ACETAMINOPHEN 325 MG: 325 TABLET, FILM COATED ORAL at 21:47

## 2021-01-01 RX ADMIN — DILTIAZEM HYDROCHLORIDE 180 MG: 180 CAPSULE, EXTENDED RELEASE ORAL at 09:32

## 2021-01-01 RX ADMIN — POTASSIUM CHLORIDE 20 MEQ: 1500 TABLET, EXTENDED RELEASE ORAL at 11:30

## 2021-01-01 RX ADMIN — ONDANSETRON 4 MG: 2 INJECTION INTRAMUSCULAR; INTRAVENOUS at 09:22

## 2021-01-01 RX ADMIN — POTASSIUM CHLORIDE 10 MEQ: 7.46 INJECTION, SOLUTION INTRAVENOUS at 13:32

## 2021-01-01 RX ADMIN — POTASSIUM CHLORIDE 20 MEQ: 1500 TABLET, EXTENDED RELEASE ORAL at 08:26

## 2021-01-01 RX ADMIN — VALSARTAN 80 MG: 80 TABLET, FILM COATED ORAL at 22:46

## 2021-01-01 RX ADMIN — AMIODARONE HYDROCHLORIDE 200 MG: 200 TABLET ORAL at 09:31

## 2021-01-01 RX ADMIN — DIATRIZOATE MEGLUMINE AND DIATRIZOATE SODIUM 75 ML: 660; 100 SOLUTION ORAL; RECTAL at 16:20

## 2021-01-01 RX ADMIN — ASPIRIN 81 MG: 81 TABLET, DELAYED RELEASE ORAL at 11:05

## 2021-01-01 RX ADMIN — POTASSIUM CHLORIDE 10 MEQ: 7.46 INJECTION, SOLUTION INTRAVENOUS at 12:14

## 2021-01-01 RX ADMIN — APIXABAN 2.5 MG: 2.5 TABLET, FILM COATED ORAL at 09:09

## 2021-01-01 RX ADMIN — POTASSIUM CHLORIDE 10 MEQ: 7.46 INJECTION, SOLUTION INTRAVENOUS at 01:13

## 2021-01-01 RX ADMIN — SODIUM CHLORIDE: 9 INJECTION, SOLUTION INTRAVENOUS at 09:38

## 2021-01-01 RX ADMIN — APIXABAN 5 MG: 5 TABLET, FILM COATED ORAL at 10:07

## 2021-01-01 RX ADMIN — ASPIRIN 81 MG: 81 TABLET, DELAYED RELEASE ORAL at 09:09

## 2021-01-01 RX ADMIN — VALSARTAN 80 MG: 80 TABLET, FILM COATED ORAL at 20:32

## 2021-01-01 RX ADMIN — HALOPERIDOL 0.5 MG: 2 SOLUTION ORAL at 14:27

## 2021-01-01 RX ADMIN — CARVEDILOL 6.25 MG: 6.25 TABLET, FILM COATED ORAL at 18:07

## 2021-01-01 RX ADMIN — FENTANYL CITRATE 25 MCG: 50 INJECTION, SOLUTION INTRAMUSCULAR; INTRAVENOUS at 11:28

## 2021-01-01 RX ADMIN — POTASSIUM CHLORIDE 20 MEQ: 20 SOLUTION ORAL at 10:07

## 2021-01-01 RX ADMIN — CARVEDILOL 6.25 MG: 6.25 TABLET, FILM COATED ORAL at 08:26

## 2021-01-01 RX ADMIN — IOPAMIDOL 100 ML: 755 INJECTION, SOLUTION INTRAVENOUS at 12:24

## 2021-01-01 RX ADMIN — METOPROLOL TARTRATE 5 MG: 1 INJECTION, SOLUTION INTRAVENOUS at 10:51

## 2021-01-01 RX ADMIN — CARVEDILOL 6.25 MG: 6.25 TABLET, FILM COATED ORAL at 20:08

## 2021-01-01 RX ADMIN — Medication 100 MG: at 10:00

## 2021-01-01 RX ADMIN — PIPERACILLIN AND TAZOBACTAM 3.38 G: 3; .375 INJECTION, POWDER, LYOPHILIZED, FOR SOLUTION INTRAVENOUS at 06:02

## 2021-01-01 RX ADMIN — HALOPERIDOL 0.5 MG: 2 SOLUTION ORAL at 18:16

## 2021-01-01 RX ADMIN — CARVEDILOL 6.25 MG: 6.25 TABLET, FILM COATED ORAL at 09:08

## 2021-01-01 RX ADMIN — VALSARTAN 80 MG: 80 TABLET, FILM COATED ORAL at 20:25

## 2021-01-01 RX ADMIN — VALSARTAN 80 MG: 80 TABLET, FILM COATED ORAL at 21:18

## 2021-01-01 RX ADMIN — PROPOFOL 60 MG: 10 INJECTION, EMULSION INTRAVENOUS at 13:07

## 2021-01-01 RX ADMIN — APIXABAN 2.5 MG: 2.5 TABLET, FILM COATED ORAL at 09:18

## 2021-01-01 RX ADMIN — PIPERACILLIN AND TAZOBACTAM 3.38 G: 3; .375 INJECTION, POWDER, LYOPHILIZED, FOR SOLUTION INTRAVENOUS at 20:29

## 2021-01-01 RX ADMIN — CARVEDILOL 6.25 MG: 6.25 TABLET, FILM COATED ORAL at 19:27

## 2021-01-01 RX ADMIN — POTASSIUM CHLORIDE 20 MEQ: 20 SOLUTION ORAL at 13:10

## 2021-01-01 RX ADMIN — VANCOMYCIN HYDROCHLORIDE 125 MG: 125 CAPSULE ORAL at 10:00

## 2021-01-01 RX ADMIN — ROCURONIUM BROMIDE 10 MG: 10 INJECTION INTRAVENOUS at 13:23

## 2021-01-01 RX ADMIN — ASPIRIN 81 MG: 81 TABLET, DELAYED RELEASE ORAL at 09:38

## 2021-01-01 RX ADMIN — APIXABAN 5 MG: 5 TABLET, FILM COATED ORAL at 21:17

## 2021-01-01 RX ADMIN — FENTANYL CITRATE 25 MCG: 50 INJECTION, SOLUTION INTRAMUSCULAR; INTRAVENOUS at 16:07

## 2021-01-01 RX ADMIN — DILTIAZEM HYDROCHLORIDE 180 MG: 180 CAPSULE, EXTENDED RELEASE ORAL at 10:07

## 2021-01-01 RX ADMIN — POTASSIUM CHLORIDE 20 MEQ: 1500 TABLET, EXTENDED RELEASE ORAL at 09:18

## 2021-01-01 RX ADMIN — POTASSIUM CHLORIDE 10 MEQ: 7.46 INJECTION, SOLUTION INTRAVENOUS at 18:47

## 2021-01-01 RX ADMIN — PIPERACILLIN AND TAZOBACTAM 3.38 G: 3; .375 INJECTION, POWDER, LYOPHILIZED, FOR SOLUTION INTRAVENOUS at 04:45

## 2021-01-01 RX ADMIN — PIPERACILLIN AND TAZOBACTAM 3.38 G: 3; .375 INJECTION, POWDER, LYOPHILIZED, FOR SOLUTION INTRAVENOUS at 21:14

## 2021-01-01 RX ADMIN — ONDANSETRON 4 MG: 2 INJECTION INTRAMUSCULAR; INTRAVENOUS at 13:09

## 2021-01-01 RX ADMIN — CARVEDILOL 6.25 MG: 6.25 TABLET, FILM COATED ORAL at 17:38

## 2021-01-01 RX ADMIN — SODIUM CHLORIDE 10 ML/HR: 9 INJECTION, SOLUTION INTRAVENOUS at 11:25

## 2021-01-01 RX ADMIN — POTASSIUM CHLORIDE 20 MEQ: 1500 TABLET, EXTENDED RELEASE ORAL at 21:12

## 2021-01-01 RX ADMIN — APIXABAN 5 MG: 5 TABLET, FILM COATED ORAL at 22:19

## 2021-01-01 RX ADMIN — METOPROLOL TARTRATE 5 MG: 1 INJECTION, SOLUTION INTRAVENOUS at 06:58

## 2021-01-01 RX ADMIN — AMIODARONE HYDROCHLORIDE 200 MG: 200 TABLET ORAL at 20:09

## 2021-01-01 RX ADMIN — PIPERACILLIN AND TAZOBACTAM 3.38 G: 3; .375 INJECTION, POWDER, LYOPHILIZED, FOR SOLUTION INTRAVENOUS at 13:06

## 2021-01-01 RX ADMIN — CARVEDILOL 6.25 MG: 6.25 TABLET, FILM COATED ORAL at 08:16

## 2021-01-01 RX ADMIN — LORAZEPAM 1 MG: 2 CONCENTRATE ORAL at 00:11

## 2021-01-01 RX ADMIN — PIPERACILLIN AND TAZOBACTAM 3.38 G: 3; .375 INJECTION, POWDER, LYOPHILIZED, FOR SOLUTION INTRAVENOUS at 13:57

## 2021-01-01 RX ADMIN — METRONIDAZOLE 500 MG: 500 TABLET ORAL at 10:00

## 2021-01-01 RX ADMIN — AMIODARONE HYDROCHLORIDE 200 MG: 200 TABLET ORAL at 09:37

## 2021-01-01 RX ADMIN — Medication 100 MG: at 20:05

## 2021-01-01 RX ADMIN — DILTIAZEM HYDROCHLORIDE 180 MG: 180 CAPSULE, EXTENDED RELEASE ORAL at 10:08

## 2021-01-01 RX ADMIN — SODIUM CHLORIDE 500 ML: 9 INJECTION, SOLUTION INTRAVENOUS at 18:56

## 2021-01-01 RX ADMIN — POTASSIUM CHLORIDE 20 MEQ: 1500 TABLET, EXTENDED RELEASE ORAL at 13:57

## 2021-01-01 RX ADMIN — POTASSIUM CHLORIDE 20 MEQ: 1500 TABLET, EXTENDED RELEASE ORAL at 20:25

## 2021-01-01 RX ADMIN — ASPIRIN 81 MG: 81 TABLET, DELAYED RELEASE ORAL at 09:29

## 2021-01-01 RX ADMIN — POTASSIUM CHLORIDE 10 MEQ: 7.46 INJECTION, SOLUTION INTRAVENOUS at 19:57

## 2021-01-01 RX ADMIN — AMIODARONE HYDROCHLORIDE 200 MG: 200 TABLET ORAL at 11:06

## 2021-01-01 RX ADMIN — MIDAZOLAM HYDROCHLORIDE 0.5 MG: 1 INJECTION, SOLUTION INTRAMUSCULAR; INTRAVENOUS at 11:27

## 2021-01-01 RX ADMIN — APIXABAN 2.5 MG: 2.5 TABLET, FILM COATED ORAL at 21:24

## 2021-01-01 RX ADMIN — PIPERACILLIN AND TAZOBACTAM 3.38 G: 3; .375 INJECTION, POWDER, LYOPHILIZED, FOR SOLUTION INTRAVENOUS at 22:03

## 2021-01-01 RX ADMIN — PROPOFOL 30 MG: 10 INJECTION, EMULSION INTRAVENOUS at 13:09

## 2021-01-01 RX ADMIN — PIPERACILLIN AND TAZOBACTAM 3.38 G: 3; .375 INJECTION, POWDER, LYOPHILIZED, FOR SOLUTION INTRAVENOUS at 12:04

## 2021-01-01 RX ADMIN — POTASSIUM CHLORIDE 20 MEQ: 1500 TABLET, EXTENDED RELEASE ORAL at 14:09

## 2021-01-01 RX ADMIN — DILTIAZEM HYDROCHLORIDE 120 MG: 120 CAPSULE, COATED, EXTENDED RELEASE ORAL at 11:06

## 2021-01-01 RX ADMIN — POTASSIUM CHLORIDE 20 MEQ: 1500 TABLET, EXTENDED RELEASE ORAL at 10:08

## 2021-01-01 RX ADMIN — POTASSIUM CHLORIDE 10 MEQ: 10 INJECTION, SOLUTION INTRAVENOUS at 06:49

## 2021-01-01 RX ADMIN — ONDANSETRON 8 MG: 8 TABLET, ORALLY DISINTEGRATING ORAL at 14:06

## 2021-01-01 RX ADMIN — PIPERACILLIN AND TAZOBACTAM 3.38 G: 3; .375 INJECTION, POWDER, LYOPHILIZED, FOR SOLUTION INTRAVENOUS at 20:32

## 2021-01-01 RX ADMIN — APIXABAN 5 MG: 5 TABLET, FILM COATED ORAL at 20:43

## 2021-01-01 RX ADMIN — POTASSIUM CHLORIDE 20 MEQ: 1.5 POWDER, FOR SOLUTION ORAL at 08:20

## 2021-01-01 RX ADMIN — Medication 10 MG: at 09:37

## 2021-01-01 RX ADMIN — POTASSIUM CHLORIDE AND SODIUM CHLORIDE: 900; 150 INJECTION, SOLUTION INTRAVENOUS at 17:37

## 2021-01-01 RX ADMIN — VALSARTAN 80 MG: 80 TABLET, FILM COATED ORAL at 21:44

## 2021-01-01 RX ADMIN — IOHEXOL 5 ML: 350 INJECTION, SOLUTION INTRAVENOUS at 09:49

## 2021-01-01 RX ADMIN — DILTIAZEM HYDROCHLORIDE 180 MG: 180 CAPSULE, EXTENDED RELEASE ORAL at 09:37

## 2021-01-01 RX ADMIN — DILTIAZEM HYDROCHLORIDE 120 MG: 120 CAPSULE, COATED, EXTENDED RELEASE ORAL at 09:26

## 2021-01-01 RX ADMIN — MAGNESIUM SULFATE HEPTAHYDRATE 2 G: 40 INJECTION, SOLUTION INTRAVENOUS at 10:04

## 2021-01-01 RX ADMIN — PIPERACILLIN AND TAZOBACTAM 3.38 G: 3; .375 INJECTION, POWDER, LYOPHILIZED, FOR SOLUTION INTRAVENOUS at 12:48

## 2021-01-01 RX ADMIN — ROCURONIUM BROMIDE 10 MG: 10 INJECTION INTRAVENOUS at 14:57

## 2021-01-01 RX ADMIN — PIPERACILLIN AND TAZOBACTAM 3.38 G: 3; .375 INJECTION, POWDER, LYOPHILIZED, FOR SOLUTION INTRAVENOUS at 05:05

## 2021-01-01 RX ADMIN — IOPAMIDOL 100 ML: 755 INJECTION, SOLUTION INTRAVENOUS at 09:16

## 2021-01-01 RX ADMIN — LORAZEPAM 0.5 MG: 2 CONCENTRATE ORAL at 17:15

## 2021-01-01 RX ADMIN — APIXABAN 5 MG: 5 TABLET, FILM COATED ORAL at 10:08

## 2021-01-01 RX ADMIN — AMIODARONE HYDROCHLORIDE 200 MG: 200 TABLET ORAL at 20:34

## 2021-01-01 RX ADMIN — POTASSIUM CHLORIDE 20 MEQ: 20 SOLUTION ORAL at 21:14

## 2021-01-01 RX ADMIN — ASPIRIN 81 MG: 81 TABLET, DELAYED RELEASE ORAL at 10:07

## 2021-01-01 RX ADMIN — DILTIAZEM HYDROCHLORIDE 120 MG: 120 CAPSULE, COATED, EXTENDED RELEASE ORAL at 09:38

## 2021-01-01 RX ADMIN — VALSARTAN 80 MG: 80 TABLET, FILM COATED ORAL at 21:12

## 2021-01-01 RX ADMIN — DILTIAZEM HYDROCHLORIDE 180 MG: 180 CAPSULE, EXTENDED RELEASE ORAL at 09:08

## 2021-01-01 RX ADMIN — ACETAMINOPHEN 650 MG: 325 TABLET ORAL at 04:09

## 2021-01-01 RX ADMIN — PROCHLORPERAZINE EDISYLATE 5 MG: 5 INJECTION INTRAMUSCULAR; INTRAVENOUS at 10:15

## 2021-01-01 RX ADMIN — AMIODARONE HYDROCHLORIDE 200 MG: 200 TABLET ORAL at 09:32

## 2021-01-01 RX ADMIN — PIPERACILLIN AND TAZOBACTAM 3.38 G: 3; .375 INJECTION, POWDER, LYOPHILIZED, FOR SOLUTION INTRAVENOUS at 05:16

## 2021-01-01 RX ADMIN — APIXABAN 5 MG: 5 TABLET, FILM COATED ORAL at 10:11

## 2021-01-01 RX ADMIN — PIPERACILLIN AND TAZOBACTAM 3.38 G: 3; .375 INJECTION, POWDER, LYOPHILIZED, FOR SOLUTION INTRAVENOUS at 05:06

## 2021-01-01 RX ADMIN — APIXABAN 2.5 MG: 2.5 TABLET, FILM COATED ORAL at 18:13

## 2021-01-01 RX ADMIN — APIXABAN 5 MG: 5 TABLET, FILM COATED ORAL at 09:09

## 2021-01-01 RX ADMIN — DILTIAZEM HYDROCHLORIDE 120 MG: 120 CAPSULE, COATED, EXTENDED RELEASE ORAL at 10:08

## 2021-01-01 RX ADMIN — ONDANSETRON 4 MG: 4 TABLET, ORALLY DISINTEGRATING ORAL at 18:40

## 2021-01-01 RX ADMIN — CARVEDILOL 6.25 MG: 6.25 TABLET, FILM COATED ORAL at 20:47

## 2021-01-01 RX ADMIN — LORAZEPAM 0.5 MG: 2 CONCENTRATE ORAL at 21:30

## 2021-01-01 RX ADMIN — APIXABAN 2.5 MG: 2.5 TABLET, FILM COATED ORAL at 21:14

## 2021-01-01 RX ADMIN — VANCOMYCIN HYDROCHLORIDE 1000 MG: 5 INJECTION, POWDER, LYOPHILIZED, FOR SOLUTION INTRAVENOUS at 13:05

## 2021-01-01 RX ADMIN — IOPAMIDOL 100 ML: 755 INJECTION, SOLUTION INTRAVENOUS at 14:21

## 2021-01-01 RX ADMIN — AMIODARONE HYDROCHLORIDE 200 MG: 200 TABLET ORAL at 09:30

## 2021-01-01 RX ADMIN — DILTIAZEM HYDROCHLORIDE 120 MG: 120 CAPSULE, COATED, EXTENDED RELEASE ORAL at 09:30

## 2021-01-01 RX ADMIN — HEPARIN SODIUM 1050 UNITS/HR: 10000 INJECTION, SOLUTION INTRAVENOUS at 12:27

## 2021-01-01 RX ADMIN — CEFPODOXIME PROXETIL 200 MG: 200 TABLET, FILM COATED ORAL at 20:05

## 2021-01-01 RX ADMIN — CARVEDILOL 6.25 MG: 6.25 TABLET, FILM COATED ORAL at 18:13

## 2021-01-01 RX ADMIN — CEFTRIAXONE 1 G: 1 INJECTION, POWDER, FOR SOLUTION INTRAMUSCULAR; INTRAVENOUS at 18:57

## 2021-01-01 RX ADMIN — POTASSIUM CHLORIDE 80 MEQ: 1500 TABLET, EXTENDED RELEASE ORAL at 12:32

## 2021-01-01 RX ADMIN — PIPERACILLIN AND TAZOBACTAM 3.38 G: 3; .375 INJECTION, POWDER, LYOPHILIZED, FOR SOLUTION INTRAVENOUS at 04:59

## 2021-01-01 RX ADMIN — DILTIAZEM HYDROCHLORIDE 180 MG: 180 CAPSULE, EXTENDED RELEASE ORAL at 10:15

## 2021-01-01 RX ADMIN — POTASSIUM CHLORIDE 10 MEQ: 7.46 INJECTION, SOLUTION INTRAVENOUS at 22:08

## 2021-01-01 RX ADMIN — DEXTROSE AND SODIUM CHLORIDE: 5; 900 INJECTION, SOLUTION INTRAVENOUS at 00:29

## 2021-01-01 RX ADMIN — ONDANSETRON 4 MG: 2 INJECTION INTRAMUSCULAR; INTRAVENOUS at 20:09

## 2021-01-01 RX ADMIN — CARVEDILOL 6.25 MG: 6.25 TABLET, FILM COATED ORAL at 11:06

## 2021-01-01 RX ADMIN — SUGAMMADEX 200 MG: 100 INJECTION, SOLUTION INTRAVENOUS at 15:13

## 2021-01-01 RX ADMIN — ACETAMINOPHEN 325 MG: 325 TABLET, FILM COATED ORAL at 05:52

## 2021-01-01 RX ADMIN — LORAZEPAM 1 MG: 2 CONCENTRATE ORAL at 22:00

## 2021-01-01 RX ADMIN — ONDANSETRON 4 MG: 4 TABLET, ORALLY DISINTEGRATING ORAL at 08:15

## 2021-01-01 RX ADMIN — FENTANYL CITRATE 50 MCG: 50 INJECTION, SOLUTION INTRAMUSCULAR; INTRAVENOUS at 13:39

## 2021-01-01 RX ADMIN — AMIODARONE HYDROCHLORIDE 200 MG: 200 TABLET ORAL at 21:14

## 2021-01-01 RX ADMIN — FENTANYL CITRATE 50 MCG: 50 INJECTION, SOLUTION INTRAMUSCULAR; INTRAVENOUS at 15:28

## 2021-01-01 RX ADMIN — POTASSIUM CHLORIDE 20 MEQ: 1500 TABLET, EXTENDED RELEASE ORAL at 09:09

## 2021-01-01 RX ADMIN — LORAZEPAM 0.5 MG: 2 CONCENTRATE ORAL at 04:50

## 2021-01-01 RX ADMIN — POTASSIUM CHLORIDE 20 MEQ: 1500 TABLET, EXTENDED RELEASE ORAL at 13:17

## 2021-01-01 RX ADMIN — AMIODARONE HYDROCHLORIDE 200 MG: 200 TABLET ORAL at 10:13

## 2021-01-01 RX ADMIN — FENTANYL CITRATE 25 MCG: 50 INJECTION, SOLUTION INTRAMUSCULAR; INTRAVENOUS at 11:30

## 2021-01-01 RX ADMIN — AMIODARONE HYDROCHLORIDE 200 MG: 200 TABLET ORAL at 08:48

## 2021-01-01 RX ADMIN — ROCURONIUM BROMIDE 10 MG: 10 INJECTION INTRAVENOUS at 14:35

## 2021-01-01 RX ADMIN — PIPERACILLIN AND TAZOBACTAM 3.38 G: 3; .375 INJECTION, POWDER, LYOPHILIZED, FOR SOLUTION INTRAVENOUS at 20:27

## 2021-01-01 RX ADMIN — VALSARTAN 80 MG: 80 TABLET, FILM COATED ORAL at 20:09

## 2021-01-01 RX ADMIN — AMIODARONE HYDROCHLORIDE 200 MG: 200 TABLET ORAL at 22:38

## 2021-01-01 RX ADMIN — ROCURONIUM BROMIDE 40 MG: 10 INJECTION INTRAVENOUS at 13:09

## 2021-01-01 RX ADMIN — CARVEDILOL 6.25 MG: 6.25 TABLET, FILM COATED ORAL at 09:18

## 2021-01-01 RX ADMIN — AMIODARONE HYDROCHLORIDE 1 MG/MIN: 50 INJECTION, SOLUTION INTRAVENOUS at 15:52

## 2021-01-01 RX ADMIN — ASPIRIN 81 MG: 81 TABLET, DELAYED RELEASE ORAL at 09:31

## 2021-01-01 RX ADMIN — DILTIAZEM HYDROCHLORIDE 120 MG: 120 CAPSULE, COATED, EXTENDED RELEASE ORAL at 09:18

## 2021-01-01 RX ADMIN — PIPERACILLIN AND TAZOBACTAM 3.38 G: 3; .375 INJECTION, POWDER, LYOPHILIZED, FOR SOLUTION INTRAVENOUS at 05:04

## 2021-01-01 RX ADMIN — ONDANSETRON 8 MG: 8 TABLET, ORALLY DISINTEGRATING ORAL at 22:43

## 2021-01-01 RX ADMIN — DILTIAZEM HYDROCHLORIDE 180 MG: 180 CAPSULE, EXTENDED RELEASE ORAL at 09:09

## 2021-01-01 RX ADMIN — APIXABAN 2.5 MG: 2.5 TABLET, FILM COATED ORAL at 20:46

## 2021-01-01 RX ADMIN — HALOPERIDOL 1 MG: 2 SOLUTION ORAL at 22:00

## 2021-01-01 RX ADMIN — PIPERACILLIN AND TAZOBACTAM 3.38 G: 3; .375 INJECTION, POWDER, LYOPHILIZED, FOR SOLUTION INTRAVENOUS at 13:17

## 2021-01-01 RX ADMIN — LIDOCAINE HYDROCHLORIDE 100 MG: 20 INJECTION, SOLUTION INFILTRATION; PERINEURAL at 13:05

## 2021-01-01 RX ADMIN — ONDANSETRON 4 MG: 2 INJECTION INTRAMUSCULAR; INTRAVENOUS at 08:44

## 2021-01-01 ASSESSMENT — ACTIVITIES OF DAILY LIVING (ADL)
WALKING_OR_CLIMBING_STAIRS: AMBULATION DIFFICULTY, REQUIRES EQUIPMENT
EQUIPMENT_CURRENTLY_USED_AT_HOME: HOSPITAL BED
DOING_ERRANDS_INDEPENDENTLY_DIFFICULTY: YES
WALKING_OR_CLIMBING_STAIRS: TRANSFERRING DIFFICULTY, DEPENDENT
PATIENT_/_FAMILY_COMMUNICATION_STYLE: SPOKEN LANGUAGE (ENGLISH OR BILINGUAL)
DRESSING/BATHING_DIFFICULTY: YES
TOILETING_ISSUES: NO
FALL_HISTORY_WITHIN_LAST_SIX_MONTHS: NO
HEARING_DIFFICULTY_OR_DEAF: NO
CONCENTRATING,_REMEMBERING_OR_MAKING_DECISIONS_DIFFICULTY: NO
DIFFICULTY_EATING/SWALLOWING: YES
DOING_ERRANDS_INDEPENDENTLY_DIFFICULTY: NO
VISION_MANAGEMENT: GLASSES
WEAR_GLASSES_OR_BLIND: YES
TOILETING_ISSUES: NO
FALL_HISTORY_WITHIN_LAST_SIX_MONTHS: NO
HEARING_DIFFICULTY_OR_DEAF: NO
DRESSING/BATHING_DIFFICULTY: NO
PATIENT_/_FAMILY_COMMUNICATION_STYLE: SPOKEN LANGUAGE (ENGLISH OR BILINGUAL)
DIFFICULTY_EATING/SWALLOWING: NO
DIFFICULTY_COMMUNICATING: NO
CONCENTRATING,_REMEMBERING_OR_MAKING_DECISIONS_DIFFICULTY: NO
VISION_MANAGEMENT: GLASSES
WALKING_OR_CLIMBING_STAIRS_DIFFICULTY: YES
EQUIPMENT_CURRENTLY_USED_AT_HOME: GRAB BAR, TUB/SHOWER
WALKING_OR_CLIMBING_STAIRS_DIFFICULTY: YES
PREVIOUS_RESPONSIBILITIES: MEAL PREP;HOUSEKEEPING;LAUNDRY;SHOPPING;DRIVING
DIFFICULTY_COMMUNICATING: NO
WEAR_GLASSES_OR_BLIND: YES
DRESSING/BATHING: BATHING DIFFICULTY, ASSISTANCE 1 PERSON

## 2021-01-01 ASSESSMENT — MIFFLIN-ST. JEOR
SCORE: 1006.91
SCORE: 1023.24
SCORE: 1003.13
SCORE: 1020.52
SCORE: 1007.82
SCORE: 1031.41
SCORE: 952.48
SCORE: 953.39
SCORE: 1004.25
SCORE: 944.77
SCORE: 944.6
SCORE: 1006.91
SCORE: 946.5
SCORE: 994.25

## 2021-01-01 ASSESSMENT — PAIN SCALES - GENERAL: PAINLEVEL: NO PAIN (0)

## 2021-01-30 NOTE — PROGRESS NOTES
Consult Notes on Referred Patient      Dr. Loly Sy  [facility]  [address] [place] [address]       RE: Angelica Toro  : 10/2/1929  LENCHO: 2021     Dear Dr. Loly INTERIANO Yossi:    I had the pleasure of seeing your patient Angelica Toro here at the Gynecologic Cancer Clinic at the [facility] on 2018.  As you know she is a very pleasant 89 year old woman with a known complex right adnexal mass that has been stable over 5yrs ([id number]), but recently has increased in size and she is now having daily pelvic pain.  Given these findings she was subsequently sent to the Gynecologic Cancer Clinic for new patient consultation.     She reports 3 years of intermittent RLQ pain. It seems to be getting worse over time and rates up 8/10 pain at times. It is worse when sweeping but walking sometimes makes it better and sometimes worse.     H/o Illness:  Brief history of disease:  2010: US  Uterus measures: 7.6 x 6.3 x 3.5 cm.  Endometrial stripe measures: Obscured by the presence of a mass. Ill-defined borders of an area of mixed echogenicity partly within the myometrium and extending into what appears to be the endometrium measuring 4.7 x 3.7 x 3.1 cm. While this could represent a submucosal fibroid the margins are somewhat ill-defined and other etiologies are not excluded. The second 3.9 cm hypoechoic mass at the uterine fundus most compatible with a fibroid. Free fluid: None. Right ovary: 4.7 x 3.1 x 3.7 cm. Multiple anechoic areas ranging in size from 3 cm to 1.5 cm compatible with multiple cyst. Left ovary: 1.5 x 1.3 x 0.9 cm.     2010: CA-125 8, AFP 2.3, inhibin A: < 1.0, beta hcg 2, CEA 1.2. Endometrial biopsy showed endocervical polyp with fragmented benign endocervical tissue.    2010 US:  No interval change in the size of the right ovary cystic lesion. Measures 4.3 x 3.4 x 3.0 cm. Again seen is the septated cystic lesion involving the right ovary.  The larger cystic components are not significantly changed since the prior study measuring 3.2 x 3.2 x 2.2 cm, 1.7 x 1.5 x 1.6 cm and 2.1 x 1.8 x 1.9 cm. No definite Doppler flow is demonstrated within the septations. The left ovary is not visualized.     Elected for conservative management until 4/2015 when she had increasing right groin pain. Thought it was secondary to a cardiac endovascular stent that was placed in 2010 through the same area.     6/1/15: CT Abdomen/Pelvis: Right adnexal mass slightly enlarged 3.8 x 4.1 x 6.3cm, known myomatous uterus.     6/12/15: CA-125 7.    6/16/15: Pelvic US: right adnexa 5.3 x 4.4 x 3.6cm with multiple contiguous cysts vs a multilocular cyst.      7/13/15: Referral to Dr Camejo. Asymptomatic from cyst. Elected for observation over surgery given age and h/o MI and stent, with repeat exam in 1yr with general OB/Gyn.    6/7/18: Pelvis US - Normal ovarian tissue is not seen, however there is a septated cyst within the right adnexa measuring 6.3 x 4.8 x 4.7 cm (4.7 x 4.0 x 4.9 cm on a previous outside CT). This has thin internal septation with no calcification. There is likely normal ovarian tissue surrounding the periphery, however this is not for certain.    7/2/18: Saw generalist OB Gyn and expressed desire to discuss again with GynOnc given new daily symptoms.    We discussed her options and she elected observation.  Rocky did well until late 2020 during which she began to note some RLQ pain resulting in a CT scan    11/18/2020 CT:  IMPRESSION:   1.  Clonic diverticulosis. No evidence of acute inflammatory process nor obstruction. Patient has had previous appendectomy and cholecystectomy.  2.  Interval enlargement of bilateral unilocular ovarian cysts which were previously demonstrated to be mildly complex on ultrasound from 2018. The right ovarian cyst measures 8 cm and the left ovarian cyst measures 4.6 cm. If no surgical intervention, these could be followed up with  ultrasound.  3.  3.1 cm saccular infrarenal aortic aneurysm previously measured 2.9 cm in 2018 when measured similarly.  4.  Minimal increase in unilocular 1.4 cm cyst within the pancreatic tail which could represent IPMN.  5.  Stable hepatic and renal cysts.    She reports that the pain has been worse of late - but that it is not bothering her at all at present        Past Medical History:  Past Medical History:   Diagnosis Date     Breast cancer (H)     left, s/p lumpectomy     CAD (coronary artery disease)     MI 2010 s/p stent     Diastolic dysfunction      Diverticulosis      HLD (hyperlipidemia)      HTN (hypertension)      Hx SBO      Right ovarian cyst      STEMI (ST elevation myocardial infarction) (H) 10/28/2010    inferior     Urinary incontinence        Past Surgical History:  Past Surgical History:   Procedure Laterality Date     APPENDECTOMY       AS CORNEAL TRANSPLANT,LAMELLAR       CHOLECYSTECTOMY       FRACTURE TX, ANKLE RT/LT       LIGATE VEIN       LUMPECTOMY BREAST Left      [place] PATIENT HAS A CORONARY ARTERY STENT  2010      BREAST BIOPSY LT         Health Maintenance:  Health Maintenance Due   Topic Date Due     COVID-19 Vaccine (1 of 2) 10/02/1945     DTAP/TDAP/TD IMMUNIZATION (1 - Tdap) 10/02/1954     ZOSTER IMMUNIZATION (1 of 2) 10/02/1979     MEDICARE ANNUAL WELLNESS VISIT  10/02/1994     FALL RISK ASSESSMENT  10/02/1994     Pneumococcal Vaccine: 65+ Years (1 of 1 - PPSV23) 10/02/1994     ADVANCE CARE PLANNING  08/11/2016     PHQ-2  01/01/2021       Last Pap Smear: NO h/o abnormal, last a few years ago    Last Mammogram: 5/22/18              Result: BIRADS 2 benign      She has had a history of abnormal mammograms - breast cancer s/p lumpectomy in 2017, did not tolerate anastrazole.    Last Colonoscopy: 10 years ago, different doctors have told her she does and doesn't need one, will discuss further with PCP                          Current Medications:   has a current medication list which  includes the following prescription(s): aspirin, atenolol, diltiazem er coated beads, ferrous gluconate, multi-vitamins, olmesartan, and pravastatin.     Allergies:   Allergies   Allergen Reactions     Amlodipine Cough, Swelling and Other (See Comments)     PN: swollen tongue, irritated throat     Anastrozole Palpitations     High b/p, headache     Lisinopril Anaphylaxis, Shortness Of Breath and Other (See Comments)     Losartan Swelling     PN: severe leg swelling     Sulfa Drugs Swelling and Anaphylaxis     Throat swelling     Thiazide-Type Diuretics Other (See Comments)     Shakes and chills.  Shakes and chills.     Coffee Bean Extract  [Coffea Arabica]      Other reaction(s): Other (See Comments)  PN: decaff coffee     Furosemide Swelling     Hydrochlorothiazide Swelling     Metolazone Other (See Comments)     PN: Trouble breathing and tremors     No Clinical Screening - See Comments Other (See Comments)     PN: decaff coffee       Social History:   Social History     Tobacco Use     Smoking status: Never Smoker     Smokeless tobacco: Never Used   Substance Use Topics     Alcohol use: Yes     Alcohol/week: 1.0 standard drinks     Types: 1 Cans of beer per week     Comment: 3 times a year       History   Drug Use No     Family History:   No family h/o cancers    Physical Exam:     PS 1  VS: BP (!) 184/83   Pulse 77   Temp 98  F (36.7  C) (Tympanic)   Resp 18   Wt 62.3 kg (137 lb 4.8 oz)   SpO2 98%   BMI 24.03 kg/m       General Appearance: healthy and alert, no distress     HEENT:  no thyromegaly, no palpable nodules or masses        Cardiovascular: regular rate and rhythm, no gallops, rubs or murmurs     Respiratory: lungs clear, no rales, rhonchi or wheezes, normal diaphragmatic excursion    Musculoskeletal: extremities non tender and without edema    Skin: no lesions or rashes     Neurological: normal gait, no gross defects     Psychiatric: appropriate mood and affect                                Hematological: normal cervical, supraclavicular and inguinal lymph nodes     Gastrointestinal:       abdomen soft, non-tender, non-distended, no organomegaly or masses    Genitourinary: External genitalia and urethral meatus appears normal.  Vagina is smooth without nodularity or masses, atrophic. Cervix appears normal.  Bimanual exam reveal fullness of right adnexa, mobile, no nodularity noted.  Recto-vaginal exam confirms these findings.      Assessment:    Angelica Toro is a woman with known right adnexal complex cyst that has increased in size from 2015, though still not worrisome for cancer, is causing daily pain    A total of 30 minutes was spent with the patient, 20 minutes of which were spent in counseling the patient and/or treatment planning.      Plan:     1.)   Pelvic masses: These remain indeterminate for diagnosis.  We have discussed the options for management including observation and excision with or without additional surgical debulking as indicated by her intra-operative findings.  I think as the masses have grown very slowly over years that a benign nature is likely.  I suspect that they may be intermittently torsing which accelerates or decelerates her discomfort.   She is asymptomatic at this point.    After a comprehensive discussion of the options with the patient and her daughter she isinclined to consider her options prior to making a definitive decision - which is reasonable.  Despite her age, she remains spry, and though her risks of surgery are not entirely mitigated by this, I think her risks are relatively low.    2.) Genetic risk factors were assessed and the patient does not meet the qualifications for a referral.      3.) Labs and/or tests ordered include: none.     4.) Health maintenance issues addressed today include none.      Thank you for allowing us to participate in the care of your patient.       Sincerely,    Juanjose Camejo      CC  Patient Care Team:  Loly Sy  A as PCP - General (OB/Gyn)  MIGUELANGEL, MARISOL INTERIANO        Addendum 2/10/21  Patient has considered her options and is interested in BSO.  She thinks she would not take chemotherapy int he event cancer was present and we discussed therefore not doing a significant debulking or staging should cancer be present, but rather a removal of disease which may cause imminent problems.  Will plan to keep er over night given advanced age, but she may be able to discontinue on same day if she is feeling well.    Juanjose Camejo MD

## 2021-02-01 NOTE — LETTER
2021         RE: Angelica Toro  [address] [facility] [address]        Dear Colleague,    Thank you for referring your patient, Angelica Toro, to the [facility]. Please see a copy of my visit note below.                            Consult Notes on Referred Patient      Dr. Loly Sy  [facility]  [address] [place] [address]       RE: Angelica Toro  : 10/2/1929  LENCHO: 2021     Dear Dr. Loly INTERIANO Yossi:    I had the pleasure of seeing your patient Angelica Toro here at the Gynecologic Cancer Clinic at the [facility] on 2018.  As you know she is a very pleasant 89 year old woman with a known complex right adnexal mass that has been stable over 5yrs ([id number]), but recently has increased in size and she is now having daily pelvic pain.  Given these findings she was subsequently sent to the Gynecologic Cancer Clinic for new patient consultation.     She reports 3 years of intermittent RLQ pain. It seems to be getting worse over time and rates up 8/10 pain at times. It is worse when sweeping but walking sometimes makes it better and sometimes worse.     H/o Illness:  Brief history of disease:  2010: US  Uterus measures: 7.6 x 6.3 x 3.5 cm.  Endometrial stripe measures: Obscured by the presence of a mass. Ill-defined borders of an area of mixed echogenicity partly within the myometrium and extending into what appears to be the endometrium measuring 4.7 x 3.7 x 3.1 cm. While this could represent a submucosal fibroid the margins are somewhat ill-defined and other etiologies are not excluded. The second 3.9 cm hypoechoic mass at the uterine fundus most compatible with a fibroid. Free fluid: None. Right ovary: 4.7 x 3.1 x 3.7 cm. Multiple anechoic areas ranging in size from 3 cm to 1.5 cm compatible with multiple cyst. Left ovary: 1.5 x 1.3 x 0.9 cm.     2010: CA-125 8, AFP 2.3, inhibin A: < 1.0, beta hcg 2, CEA 1.2. Endometrial biopsy  showed endocervical polyp with fragmented benign endocervical tissue.    8/2010 US:  No interval change in the size of the right ovary cystic lesion. Measures 4.3 x 3.4 x 3.0 cm. Again seen is the septated cystic lesion involving the right ovary. The larger cystic components are not significantly changed since the prior study measuring 3.2 x 3.2 x 2.2 cm, 1.7 x 1.5 x 1.6 cm and 2.1 x 1.8 x 1.9 cm. No definite Doppler flow is demonstrated within the septations. The left ovary is not visualized.     Elected for conservative management until 4/2015 when she had increasing right groin pain. Thought it was secondary to a cardiac endovascular stent that was placed in 2010 through the same area.     6/1/15: CT Abdomen/Pelvis: Right adnexal mass slightly enlarged 3.8 x 4.1 x 6.3cm, known myomatous uterus.     6/12/15: CA-125 7.    6/16/15: Pelvic US: right adnexa 5.3 x 4.4 x 3.6cm with multiple contiguous cysts vs a multilocular cyst.      7/13/15: Referral to Dr Camejo. Asymptomatic from cyst. Elected for observation over surgery given age and h/o MI and stent, with repeat exam in 1yr with general OB/Gyn.    6/7/18: Pelvis US - Normal ovarian tissue is not seen, however there is a septated cyst within the right adnexa measuring 6.3 x 4.8 x 4.7 cm (4.7 x 4.0 x 4.9 cm on a previous outside CT). This has thin internal septation with no calcification. There is likely normal ovarian tissue surrounding the periphery, however this is not for certain.    7/2/18: Saw generalist OB Gyn and expressed desire to discuss again with GynOnc given new daily symptoms.    We discussed her options and she elected observation.  Rocky did well until late 2020 during which she began to note some RLQ pain resulting in a CT scan    11/18/2020 CT:  IMPRESSION:   1.  Clonic diverticulosis. No evidence of acute inflammatory process nor obstruction. Patient has had previous appendectomy and cholecystectomy.  2.  Interval enlargement of bilateral  unilocular ovarian cysts which were previously demonstrated to be mildly complex on ultrasound from 2018. The right ovarian cyst measures 8 cm and the left ovarian cyst measures 4.6 cm. If no surgical intervention, these could be followed up with ultrasound.  3.  3.1 cm saccular infrarenal aortic aneurysm previously measured 2.9 cm in 2018 when measured similarly.  4.  Minimal increase in unilocular 1.4 cm cyst within the pancreatic tail which could represent IPMN.  5.  Stable hepatic and renal cysts.    She reports that the pain has been worse of late - but that it is not bothering her at all at present        Past Medical History:  Past Medical History:   Diagnosis Date     Breast cancer (H)     left, s/p lumpectomy     CAD (coronary artery disease)     MI 2010 s/p stent     Diastolic dysfunction      Diverticulosis      HLD (hyperlipidemia)      HTN (hypertension)      Hx SBO      Right ovarian cyst      STEMI (ST elevation myocardial infarction) (H) 10/28/2010    inferior     Urinary incontinence        Past Surgical History:  Past Surgical History:   Procedure Laterality Date     APPENDECTOMY       AS CORNEAL TRANSPLANT,LAMELLAR       CHOLECYSTECTOMY       FRACTURE TX, ANKLE RT/LT       LIGATE VEIN       LUMPECTOMY BREAST Left      CT PATIENT HAS A CORONARY ARTERY STENT  2010      BREAST BIOPSY LT         Health Maintenance:  Health Maintenance Due   Topic Date Due     COVID-19 Vaccine (1 of 2) 10/02/1945     DTAP/TDAP/TD IMMUNIZATION (1 - Tdap) 10/02/1954     ZOSTER IMMUNIZATION (1 of 2) 10/02/1979     MEDICARE ANNUAL WELLNESS VISIT  10/02/1994     FALL RISK ASSESSMENT  10/02/1994     Pneumococcal Vaccine: 65+ Years (1 of 1 - PPSV23) 10/02/1994     ADVANCE CARE PLANNING  08/11/2016     PHQ-2  01/01/2021       Last Pap Smear: NO h/o abnormal, last a few years ago    Last Mammogram: 5/22/18              Result: BIRADS 2 benign      She has had a history of abnormal mammograms - breast cancer s/p lumpectomy in  2017, did not tolerate anastrazole.    Last Colonoscopy: 10 years ago, different doctors have told her she does and doesn't need one, will discuss further with PCP                          Current Medications:   has a current medication list which includes the following prescription(s): aspirin, atenolol, diltiazem er coated beads, ferrous gluconate, multi-vitamins, olmesartan, and pravastatin.     Allergies:   Allergies   Allergen Reactions     Amlodipine Cough, Swelling and Other (See Comments)     PN: swollen tongue, irritated throat     Anastrozole Palpitations     High b/p, headache     Lisinopril Anaphylaxis, Shortness Of Breath and Other (See Comments)     Losartan Swelling     PN: severe leg swelling     Sulfa Drugs Swelling and Anaphylaxis     Throat swelling     Thiazide-Type Diuretics Other (See Comments)     Shakes and chills.  Shakes and chills.     Coffee Bean Extract  [Coffea Arabica]      Other reaction(s): Other (See Comments)  PN: decaff coffee     Furosemide Swelling     Hydrochlorothiazide Swelling     Metolazone Other (See Comments)     PN: Trouble breathing and tremors     No Clinical Screening - See Comments Other (See Comments)     PN: decaff coffee       Social History:   Social History     Tobacco Use     Smoking status: Never Smoker     Smokeless tobacco: Never Used   Substance Use Topics     Alcohol use: Yes     Alcohol/week: 1.0 standard drinks     Types: 1 Cans of beer per week     Comment: 3 times a year       History   Drug Use No     Family History:   No family h/o cancers    Physical Exam:     PS 1  VS: BP (!) 184/83   Pulse 77   Temp 98  F (36.7  C) (Tympanic)   Resp 18   Wt 62.3 kg (137 lb 4.8 oz)   SpO2 98%   BMI 24.03 kg/m       General Appearance: healthy and alert, no distress     HEENT:  no thyromegaly, no palpable nodules or masses        Cardiovascular: regular rate and rhythm, no gallops, rubs or murmurs     Respiratory: lungs clear, no rales, rhonchi or wheezes,  normal diaphragmatic excursion    Musculoskeletal: extremities non tender and without edema    Skin: no lesions or rashes     Neurological: normal gait, no gross defects     Psychiatric: appropriate mood and affect                               Hematological: normal cervical, supraclavicular and inguinal lymph nodes     Gastrointestinal:       abdomen soft, non-tender, non-distended, no organomegaly or masses    Genitourinary: External genitalia and urethral meatus appears normal.  Vagina is smooth without nodularity or masses, atrophic. Cervix appears normal.  Bimanual exam reveal fullness of right adnexa, mobile, no nodularity noted.  Recto-vaginal exam confirms these findings.      Assessment:    Angelica Toro is a woman with known right adnexal complex cyst that has increased in size from 2015, though still not worrisome for cancer, is causing daily pain    A total of 30 minutes was spent with the patient, 20 minutes of which were spent in counseling the patient and/or treatment planning.      Plan:     1.)   Pelvic masses: These remain indeterminate for diagnosis.  We have discussed the options for management including observation and excision with or without additional surgical debulking as indicated by her intra-operative findings.  I think as the masses have grown very slowly over years that a benign nature is likely.  I suspect that they may be intermittently torsing which accelerates or decelerates her discomfort.   She is asymptomatic at this point.    After a comprehensive discussion of the options with the patient and her daughter she isinclined to consider her options prior to making a definitive decision - which is reasonable.  Despite her age, she remains spry, and though her risks of surgery are not entirely mitigated by this, I think her risks are relatively low.    2.) Genetic risk factors were assessed and the patient does not meet the qualifications for a referral.      3.) Labs and/or tests  ordered include: none.     4.) Health maintenance issues addressed today include none.      Thank you for allowing us to participate in the care of your patient.       Sincerely,    Juanjose MIKE  Patient Care Team:  Loly Sy as PCP - General (OB/Gyn)

## 2021-02-15 PROBLEM — R19.00 PELVIC MASS: Status: ACTIVE | Noted: 2021-01-01

## 2021-02-15 NOTE — PROGRESS NOTES
Surgery is scheduled with Dr. Camejo on 3/24 at [place].  Scheduled per RN.    H&P: to be completed by PCP or Surgeon    Additional appointments:   NO ADDITIONAL APPOINTMENTS NEEDED AT THIS TIME    COVID-19 test: WILL BE at a clinic near the patient, if they are able to schedule within 4 days and get results     Post-op: will be scheduled by the clinic.     The RN completed the education regarding the surgery.     Patient will receive a phone call from pre-admission nurses 1-2 days prior to surgery with arrival and start time.    The surgery packet was provided by the RN during appointment.    Patient will contact clinic near home to schedule COVID-19 test 2-4 days prior to surgery.    Per communication - I did not need to reach out to the patient regarding the above information. If this changes, I will reach out.

## 2021-02-16 NOTE — PROGRESS NOTES
Spoke with pt   Pt has decided she wants to move forward with surgery.  Discussed potential surgery dates, pt would like to have it done in late march after 3/22  Explained will need to get orders from md   Once orders are received will work with surgery scheduler to figure out date options  Will need lab work, ekg, chest xray and covid testing, will try to get this done with PCP clinic dr Bethea, will call that office to figure this out    Patient re ports understanding

## 2021-03-26 PROBLEM — Z98.890 S/P LAPAROSCOPY: Status: ACTIVE | Noted: 2021-01-01

## 2021-03-26 NOTE — BRIEF OP NOTE
[facility]    Brief Operative Note    Pre-operative diagnosis: Pelvic mass [R19.00]  Post-operative diagnosis Same as pre-operative diagnosis    Procedure: Procedure(s):  Laparoscopic BSO. Cystoscopy  Surgeon: Surgeon(s) and Role:     * Juanjose Camejo MD - Primary     * Neisha Laboy MD - Resident - Assisting  Anesthesia: Combined General with Block   Estimated blood loss: 50 ml  Drains: None  Specimens:   ID Type Source Tests Collected by Time Destination   A : right tube and ovary Organ Fallopian Tube and Ovary, Right SURGICAL PATHOLOGY EXAM Juanjose Camejo MD 3/26/2021  2:39 PM    C : left tube and ovary- immediate gross Tissue Fallopian Tube and Ovary, Left SURGICAL PATHOLOGY EXAM Juanjose Camejo MD 3/26/2021  2:36 PM      Findings:   On laparoscopy, no evidence of injury with entry. Some filmy adhesions of omentum to anterior abdominal wall. Normal appearing liver, gallbladder surgically absent. No evidence of widely metastatic disease. Large bowel densely adherent to left pelvic sidewall. Bilateral ovarian cysts noted, appear well circumscribed. Hemostatic surgical site at end of case. On cystoscopy, brisk ureteral jets noted bilaterally.  Complications: None.  Implants: * No implants in log *

## 2021-03-26 NOTE — DISCHARGE SUMMARY
Gynecologic Oncology Discharge Summary    Angelica Toro  [id number]    Admit Date: 3/26/2021  Discharge Date: 3/27/2021  Admitting Provider: Juanjose Camejo MD  Discharge Provider: Dr. Juarez    Admission Dx:   - Bilateral ovarian masses  - Coronary artery disease  - Hypertension  - Hyperlipidemia    Discharge Dx:  - S/p procedure below  - Bilateral ovarian masses  - Coronary artery disease  - Hypertension  - Hyperlipidemia    Patient Active Problem List   Diagnosis     Asymptomatic varicose veins     Benign neoplasm of colon     Coronary atherosclerosis     Active advance directive     Complex cyst of right ovary     Diastolic dysfunction     Diverticulosis of large intestine     Dyslipidemia     ER+ (estrogen receptor positive status)     Essential hypertension     Family hx-breast malignancy     Hyperlipidemia     Lumbago     Malignant neoplasm of lower-inner quadrant of female breast (H)     Monoallelic mutation of PMS2 gene     Small bowel obstruction (H)     Urinary incontinence     Pelvic mass     S/P laparoscopy       Procedures: Laparoscopic bilateral salpingoophorectomy, cystoscopy    Prior to Admission Medications:  Medications Prior to Admission   Medication Sig Dispense Refill Last Dose     aspirin 81 MG EC tablet Take 81 mg by mouth   3/19/2021 at Unknown time     carvedilol (COREG) 6.25 MG tablet TAKE 1 TABLET BY MOUTH TWICE DAILY WITH MEALS   3/26/2021 at 0700     diltiazem 300 MG 24 hr capsule Take 300 mg by mouth   3/26/2021 at 1030     Ferrous Gluconate 225 (27 Fe) MG TABS 225 mg   3/24/2021 at Unknown time     valsartan (DIOVAN) 80 MG tablet Take 80 mg by mouth daily    3/25/2021 at Unknown time     Multiple Vitamin (MULTI-VITAMINS) TABS Take 1 tablet by mouth   Unknown at Unknown time     olmesartan (BENICAR) 20 MG tablet Take 20 mg by mouth        pravastatin (PRAVACHOL) 20 MG tablet Take 20 mg by mouth   More than a month at Unknown time       Discharge Medications:      Review of your  medicines      START taking      Dose / Directions   oxyCODONE 5 MG tablet  Commonly known as: ROXICODONE      Dose: 5 mg  Take 1 tablet (5 mg) by mouth every 6 hours as needed for severe pain  Quantity: 6 tablet  Refills: 0        CONTINUE these medicines which have NOT CHANGED      Dose / Directions   aspirin 81 MG EC tablet  Commonly known as: ASA      Dose: 81 mg  Take 81 mg by mouth  Refills: 0     carvedilol 6.25 MG tablet  Commonly known as: COREG      TAKE 1 TABLET BY MOUTH TWICE DAILY WITH MEALS  Refills: 0     diltiazem ER COATED BEADS 300 MG 24 hr capsule  Commonly known as: CARDIZEM CD/CARTIA XT      Dose: 300 mg  Take 300 mg by mouth  Refills: 0     Ferrous Gluconate 225 (27 Fe) MG Tabs      Dose: 225 mg  225 mg  Refills: 0     Multi-Vitamins Tabs      Dose: 1 tablet  Take 1 tablet by mouth  Refills: 0     olmesartan 20 MG tablet  Commonly known as: BENICAR      Dose: 20 mg  Take 20 mg by mouth  Refills: 0     pravastatin 20 MG tablet  Commonly known as: PRAVACHOL      Dose: 20 mg  Take 20 mg by mouth  Refills: 0     valsartan 80 MG tablet  Commonly known as: DIOVAN      Dose: 80 mg  Take 80 mg by mouth daily  Refills: 0           Where to get your medicines      Some of these will need a paper prescription and others can be bought over the counter. Ask your nurse if you have questions.    Bring a paper prescription for each of these medications    oxyCODONE 5 MG tablet           Consultations:   Anesthesia    Brief History of Illness:  Angelica Toro is a 89 year old with known bilateral adnexal masses. She was referred to Gynecologic Oncology clinic when the masses began to increase in size and cause her pain. She was counseled on surgical removal vs. conservative management and elected to undergo the procedure above.     Hospital Course:  Dz:   - Preoperative diagnosis was bilateral adnexal masses.  Frozen section at the time of the surgery showed a benign non-mucinous cyst.  Final pathology is  pending at the time of discharge.  She will follow-up postoperatively for a care plan.  FEN:   - Her diet was advanced to regular by POD#0.  By discharge, she was tolerating a regular diet without nausea and vomiting and able to maintain her hydration without IVF supplementation.  Pain:   - Once tolerating PO pain meds, she was transitioned to a PO pain regimen.  Her pain was well controlled on this and she was discharged home with these medications.  CV:   - The patient has a history of CAD, HTN, and HLD. She was continued on her home medications while inpatient.  Her vital signs were stable while in house and she had no acute CV issues.  PULM:   - She has no history of pulmonary issues.  She was initially given O2 supplementation in to maintain her O2 sats in the immediate postop period and was transitioned off of this without difficulty.  By discharge, her O2 sats were greater than 94% on RA.  She was encouraged to use her bedside IS while in house.  She had no acute pulmonary issues while in house.  HEME:   - Her preoperative Hgb was 11.5 and her EBL from the procedure was 50 ml. She had no symptoms of acute blood loss anemia during the hospitalization.  She had no other acute heme issues while in house.  GI:   - She was made NPO prior to the procedure.  On POD#0, her diet was advanced to a regular diet.  At the time of discharge, she was tolerating a regular diet without nausea and vomiting.  She will be discharged with a bowel regimen to prevent constipation in the postoperative period.  She had no acute GI issues while in house.  :    - A dickey catheter was placed at the time of the surgery and removed at the end of the case.   Prior to discharge, the patient was voiding spontaneously without difficulty.  She had no acute  issues while in house.  ID:   - The patient was AF during her hospitalization.  She received standard preoperative antibiotics without incident.    ENDO:   - No issues  PSYCH/NEURO:   -  No issues  PPX:    -  She was given SCDs and IS during her hospital course.  She tolerated these prophylactic interventions without incident.  They were discontinued at the time of her discharge.      Discharge Instructions and Follow up:  Ms. Angelica Toro was discharged from the hospital with follow up with Dr. Camejo in 2 weeks.    Discharge Diet: Regular  Discharge Activity: No heavy lifting for 6 week(s)  Discharge Follow up: With Dr. Camejo    Discharge Disposition:  Discharged to home    Discharge Staff: Larry Mahmood MD  Obstetrics and Gynecology PGY2    I saw patient day of discharge and deemed her stable for discharge to home with assistance of family. She has follow-up as above. I agree with the resident plan as documented.    Gregg Juarez MD

## 2021-03-26 NOTE — ANESTHESIA POSTPROCEDURE EVALUATION
"Patient: Angelica Toro    Procedure(s):  Laparoscopic BSO. Cystoscopy    Diagnosis:Pelvic mass [R19.00]  Diagnosis Additional Information: No value filed.    Anesthesia Type:  General    Note:     Postop Pain Control: Uneventful            Sign Out: Well controlled pain   PONV: No   Neuro/Psych: Uneventful            Sign Out: Acceptable/Baseline neuro status   Airway/Respiratory: Uneventful            Sign Out: Acceptable/Baseline resp. status   CV/Hemodynamics: Uneventful            Sign Out: Acceptable CV status   Other NRE: NONE   DID A NON-ROUTINE EVENT OCCUR? No    Event details/Postop Comments:  Doing great. Remarkably quick emergence from anesthesia without issue. Coughing a fair amount to \"clear throat\" but no dyspnea. Denies nausea and reports pain is tolerable. Okay for discharge from PACU.          Last vitals:  Vitals:    03/26/21 1152 03/26/21 1524 03/26/21 1540   BP: (!) 160/85 (!) 176/89 (!) 171/87   Pulse: 74 79 70   Resp: 18 20 20   Temp: 36.9  C (98.4  F) 36.3  C (97.4  F)    SpO2: 98% 100% 100%       Last vitals prior to Anesthesia Care Transfer:  CRNA VITALS  3/26/2021 1451 - 3/26/2021 1551      3/26/2021             Pulse:  78    SpO2:  97 %          Electronically Signed By: Arthur Bush MD  March 26, 2021  4:06 PM  "

## 2021-03-26 NOTE — ANESTHESIA CARE TRANSFER NOTE
Patient: Angelica Toro    Procedure(s):  Laparoscopic BSO. Cystoscopy    Diagnosis: Pelvic mass [R19.00]  Diagnosis Additional Information: No value filed.    Anesthesia Type:   General     Note:      Level of Consciousness: awake  Oxygen Supplementation: nasal cannula  Level of Supplemental Oxygen (L/min / FiO2): 2  Independent Airway: airway patency satisfactory and stable    Vital Signs Stable: post-procedure vital signs reviewed and stable  Report to RN Given: handoff report given  Patient transferred to: PACU  Comments: Awake with good patent airway.  VSS  Handoff Report: Identifed the Patient, Identified the Reponsible Provider, Reviewed the pertinent medical history, Discussed the surgical course, Reviewed Intra-OP anesthesia mangement and issues during anesthesia, Set expectations for post-procedure period and Allowed opportunity for questions and acknowledgement of understanding      Vitals: (Last set prior to Anesthesia Care Transfer)  CRNA VITALS  3/26/2021 1451 - 3/26/2021 1528      3/26/2021             Pulse:  78    SpO2:  97 %        Electronically Signed By: JIMMY Julian CRNA  March 26, 2021  3:28 PM

## 2021-03-26 NOTE — OR NURSING
MD Bush notified of persistent high SBPs. Most recent 188/91 (129). No interventions at this time. Thanks!

## 2021-03-27 NOTE — PROGRESS NOTES
"Pt amb to BR gait steady with walker and SBA. Good uv, voiding w/o difficulty. Cont to deny pain, able to sleep between cares. Cont to monitor return of bowel fxn.     Pt requested 1 tylenol for pain this am. Stated the abd pain wasn't \"too bad\" so she preferred 1 rather than 2 tabs. Pt up to BR again with ease. Moving well with walker. In good spirits.  "

## 2021-03-27 NOTE — PROGRESS NOTES
Admitted/transferred from: PACU.  2 RN full   skin assessment completed by Madelyn Chatterjee, RAGHAV and Margie Peterson RN.  Skin assessment finding: skin intact, no problems. Mepilex on sacrum intact. Pt's skin thin, bruises on legs, dry and flaky skin, intact without open areas or sores.  Interventions/actions: other Continue to monitor. No action necessary at this time.     Will continue to monitor.

## 2021-03-27 NOTE — PLAN OF CARE
AVSS, denies pain, declined all pain med offers, says does not like narcotics. Tolerating regular diet and activity, up in [place] with walker and staff. Lap sites clean and intact. Taking good po fluids and voiding good amounts. Seen by team, all agree can go home today. Daughter here in afternoon, discussed discharge instructions, question answered. Pt assisted to w/c for transport to exit with daughter for private transport home.

## 2021-03-27 NOTE — PROGRESS NOTES
Gynecologic Oncology Postoperative Check Note  3/26/2021    S: Patient reports she is doing well postoperatively. Pain is well controlled with po pain medications. Has ambulated to the bathroom and has urinated. Had some nausea when standing up. Tolerating crackers, water and ginger ale. Denies chest pain, shortness of breath, dizziness, or other concerns at this time.    O:  Vitals:    03/26/21 1800 03/26/21 1830 03/26/21 1900 03/26/21 2000   BP: (!) 144/82 (!) 155/76 (!) 171/83 (!) 153/72   BP Location:       Pulse: 73 69 76 76   Resp: 14 15 13 16   Temp:       TempSrc:       SpO2: 97% 96% 97%    Weight:       Height:           Gen: NAD  Cardio: RRR  Resp: CTAB, no wheezing or crackles  Abdomen: soft, appropriately tender, mildly distended, bandages in place, clean,dry, intact  Extremities: Non-tender, trace edema    A/P: 89 year old POD#0 s/p lap BSO, cysto    Dz: Bilateral pelvic masses. Benign non-mucinous cyst on frozen.  FEN: Reg diet   Pain: PO tylenol, oxycodone   Heme: Hgb 11.5 > EBL 50 >  CV: CAD w/ history of NSTEMI: ASA (held)   - HTN: coreg, diltiazem, valsartan (ord)   - HLD: pravastatin (ord)   Pulm: NI  GI: Stool softeners prn   : S/p dickey  ID: S/p preop antibiotics  Endo: NI  Psych/Neuro/MSK: NI  PPX: SCDs, IS  Dispo: pending postop goals  Drains/Lines: PIV    Dispo: Admitted for obs overnight 2/2 age and comorbidities    Angie Linton MD MSc  OBGYN Resident, PGY2  March 26, 2021, 9:00 PM

## 2021-03-27 NOTE — PROGRESS NOTES
"Gynecology Oncology Progress Note  03/27/21    POD# 1 s/p lsc BSO, cystoscopy    Disease: Bilateral pelvic masses. Benign non-mucinous cyst on frozen.    24 hour events:   - Surgery stated above  - Ambulating to bathroom w/ walker  - Passing flatus    Subjective: Patient is feeling well this AM.  Pain is well controlled. Eating and drinking well.  Passing flatus, no BM. Voiding spontaneously.     Objective:   Patient Vitals for the past 24 hrs:   BP Temp Temp src Pulse Resp SpO2 Height Weight   03/27/21 0608 122/57 98.4  F (36.9  C) Oral 78 17 94 % -- --   03/27/21 0500 113/57 98.8  F (37.1  C) Oral 81 15 95 % -- --   03/27/21 0040 127/58 -- -- 76 16 90 % -- --   03/27/21 0010 (!) 146/70 -- -- 87 11 97 % -- --   03/27/21 0000 (!) 153/67 97.8  F (36.6  C) -- 81 16 96 % -- --   03/26/21 2331 (!) 151/67 97.9  F (36.6  C) Oral 83 12 95 % -- --   03/26/21 2000 (!) 153/72 -- -- 76 16 -- -- --   03/26/21 1900 (!) 171/83 -- -- 76 13 97 % -- --   03/26/21 1830 (!) 155/76 -- -- 69 15 96 % -- --   03/26/21 1800 (!) 144/82 -- -- 73 14 97 % -- --   03/26/21 1725 -- -- -- -- -- 98 % -- --   03/26/21 1710 (!) 162/74 -- -- 63 14 -- -- --   03/26/21 1701 -- -- -- -- -- 97 % -- --   03/26/21 1700 (!) 164/74 -- -- 64 14 (!) 88 % -- --   03/26/21 1650 (!) 162/81 -- -- 71 10 96 % -- --   03/26/21 1640 (!) 168/94 -- -- 83 11 98 % -- --   03/26/21 1631 -- -- -- -- -- 98 % -- --   03/26/21 1630 (!) 182/77 -- -- 65 8 (!) 86 % -- --   03/26/21 1620 (!) 178/76 -- -- 64 9 -- -- --   03/26/21 1610 (!) 181/82 97.7  F (36.5  C) Oral 65 10 95 % -- --   03/26/21 1607 -- -- -- -- -- 98 % -- --   03/26/21 1600 (!) 187/86 -- -- 65 20 98 % -- --   03/26/21 1540 (!) 171/87 -- -- 70 20 100 % -- --   03/26/21 1524 (!) 176/89 97.4  F (36.3  C) Oral 79 20 100 % -- --   03/26/21 1152 (!) 160/85 98.4  F (36.9  C) Oral 74 18 98 % 1.6 m (5' 3\") 61.9 kg (136 lb 7.4 oz)     General: NAD, appears comfortable in bed  CV: RRR, no m/r/g  Resp: CTAB, no " wheezes  Abdomen: soft, mildly tender, mildly distended  Incision: c/d/i, dressings in place  Extremities: warm, well-perfused, nontender, trace edema, SCDs in place    Output:   - 300cc out since midnight    New labs/imaging-  none    Assessment: 89 year old POD#1 s/p lap BSO, cysto    Dz: Bilateral pelvic masses. Benign non-mucinous cyst on frozen.  FEN: Reg diet   Pain: PO tylenol, oxycodone   Heme: Hgb 11.5 > EBL 50 >   CV: CAD w/ history of NSTEMI: ASA (held)   - HTN: coreg, diltiazem, valsartan (ord)   - HLD: pravastatin (ord)   Pulm: NI  GI: Stool softeners prn   : S/p dickey  ID: S/p preop antibiotics  Endo: NI  Psych/Neuro/MSK: NI  PPX: SCDs, IS  Dispo: pending postop goals  Drains/Lines: PIV    Angie Linton MD MSc  OBGYN Resident, PGY2  March 27, 2021, 8:12 AM      Gynecologic Oncology Attending Attestation  I have seen the patient on rounds with the team.   I have discussed the patient and plan of care with the resident as documented in the note above, which I have edited as necessary.    Gregg Juarez MD    Gynecologic Oncology

## 2021-03-27 NOTE — PROGRESS NOTES
Pt arrived on unit at 2335 on 3/26/21 via litter from PACU. Pt A/O x4. Pt denied c/o's pain. Abd round, soft, +bs, denied nausea. Lap sites x4 with dressings D/I. IS enc=1500ml. Tahmina well. Pt PIV saline locked. 2 person skin assessment complete. Observation status information given to pt. Cont to monitor. Maintain pain control.

## 2021-03-27 NOTE — OP NOTE
Procedure Date: 03/26/2021      PREOPERATIVE DIAGNOSIS:  Bilateral cystic adnexal masses.      POSTOPERATIVE DIAGNOSIS:  Cystadenomas of the ovary with possible torsion of the left ovary.      PROCEDURE:  Laparoscopy, bilateral salpingo-oophorectomy with some lysis of adhesions on the left side, and cystoscopy.      FINDINGS:  Intraoperative findings included some fibrosis of the sigmoid colon without evidence of carcinomatosis or stricture.  The upper abdomen appeared grossly normal.  She does have some scars near her cholecystectomy incision, but otherwise the remainder of the examination was within normal limits.  There was a large left cystic adnexal mass, which was relatively mobile but placed in the posterior cul-de-sac.  The left ovary was in the ovarian fossa, however, was densely covered by the sigmoid colon.      ATTENDING:  Juanjose Camejo MD      ASSISTANT:  MD Narda      ANESTHESIA:  GET.      COMPLICATIONS:  None.      IV FLUIDS:  500       URINE OUTPUT:  200 mL      URINE OUTPUT:  Approximately 50      SPECIMENS:  Intraoperative frozen section suggested benign disease.      OPERATIVE REPORT:  After obtaining informed consent, the patient was brought to the operating room, placed in supine position.  She underwent the induction of general anesthesia.  She was prepped and draped in the usual sterile fashion including placement of a sterile Ferrer and an OG tube.  The umbilicus was everted, incised and insufflated to 14 mmHg using a Veress needle.  A 5 mm trocar was then introduced and the abdomen was surveyed.  There was no evidence of injury and the above findings were noted.  Two additional ports were placed in the right and left lower quadrant, 5 mm on the right, 12 mm on the left.  At this point the washings were obtained.  Unfortunately, they did not apparently get processed properly and were lost.  The procedure was initiated on the right side.  The ureter was identified.  The  retroperitoneum was entered lateral to the IP ligament.  The IP ligament was isolated and transected with care to avoid the underlying ureter.  Dissection was carried medially to the cornua and the specimen was amputated and displaced into the upper abdomen.  The uterus could be identified.  There was a large degenerated fibroid on the anterior aspect, but the left ovary could not be easily identified.  The proximal fallopian tube was visible, but went into a mass of adhesions and large bowel at the level of the deep pelvis.  Using sharp dissection, the adhesions of the bowel to the lateral sidewall were lysed and ultimately the mass could be seen.  There did appear to be some evidence of torsion around the fallopian tube and a large cystic mass without excrescences was identified.  We therefore opened the pelvic sidewall lateral to the IP ligament, identified the psoas muscle, external and internal iliac vessels and ultimately the ureter.  With the IP ligament identified, it could be  and then cauterized and transected.  This still required separation of the mass from the lateral aspect of the bowel, although with sharp dissection this could be completed without evidence of bowel trauma.  Given the difficulty in finding the ureter, we did a cystoscopy at the conclusion of excising the mass and both ureters were patent.  The masses were placed in bags and decompressed in the bag and delivered.  The frozen section was sent on the right ovary, which was the larger and somewhat more abnormal appearing of the two.  At the conclusion, there was good hemostasis, but a wide broad surgical bed on the left side that we placed Surgicel on and that did not fill with blood.  We did let the insufflation down and no additional bleeding was identified.  The IP ligament sites were hemostatic and the uterine sites were likewise hemostatic.  At this point we decided to terminate the procedure.  The patient was placed back in  supine position.  A 12 mm trocar was closed using the Hayden-Margret and the other incisions were closed at the skin.  It was infiltrated with a small amount of Marcaine.         WILEY FRASER MD             D: 2021   T: 2021   MT: ELIZABET      Name:     EULOGIO BINGHAM   MRN:      [id number]        Account:        [id number]   :      10/02/1929           Procedure Date: 2021      Document: [id number]

## 2021-03-29 NOTE — TELEPHONE ENCOUNTER
[facility] Triage Telephone Call    Patient called to ask if dressings can be removed following laparoscopic surgery last week.  Informed will ask team to call her to discuss.  She is otherwise feeling well.    Clare Emerson RN BSN, HNBC, STAR-T

## 2021-04-02 NOTE — TELEPHONE ENCOUNTER
Spoke with pt   Has surgery set but the 24th does not work  Looking for options, given 36th, 30th, 31st  disucssed BP meds she should take prior to surgery-diltiazem, atenolol, coreg, valsartan  Discussed covid vaccine and if she should get it  Has preanesthesia appt    Will discuss date options with surgery scheduler and get back to pt

## 2021-04-09 NOTE — PROGRESS NOTES
Consult Notes on Referred Patient      Dr. Loly Sy  [place] [facility]  [address] [place] [address]       RE: Angelica Toro  : 10/2/1929  LENCHO: 2021     Dear Dr. Loly INTERIANO Yossi:    I had the pleasure of seeing your patient Angelica Toro here at the Gynecologic Cancer Clinic at the [facility] on 2018.  As you know she is a very pleasant 89 year old woman with a known complex right adnexal mass that has been stable over 5yrs ([id number]), but recently has increased in size and she is now having daily pelvic pain.  Given these findings she was subsequently sent to the Gynecologic Cancer Clinic for new patient consultation.     She reports 3 years of intermittent RLQ pain. It seems to be getting worse over time and rates up 8/10 pain at times. It is worse when sweeping but walking sometimes makes it better and sometimes worse.     H/o Illness:  Brief history of disease:  2010: US  Uterus measures: 7.6 x 6.3 x 3.5 cm.  Endometrial stripe measures: Obscured by the presence of a mass. Ill-defined borders of an area of mixed echogenicity partly within the myometrium and extending into what appears to be the endometrium measuring 4.7 x 3.7 x 3.1 cm. While this could represent a submucosal fibroid the margins are somewhat ill-defined and other etiologies are not excluded. The second 3.9 cm hypoechoic mass at the uterine fundus most compatible with a fibroid. Free fluid: None. Right ovary: 4.7 x 3.1 x 3.7 cm. Multiple anechoic areas ranging in size from 3 cm to 1.5 cm compatible with multiple cyst. Left ovary: 1.5 x 1.3 x 0.9 cm.     2010: CA-125 8, AFP 2.3, inhibin A: < 1.0, beta hcg 2, CEA 1.2. Endometrial biopsy showed endocervical polyp with fragmented benign endocervical tissue.    2010 US:  No interval change in the size of the right ovary cystic lesion. Measures 4.3 x 3.4 x 3.0 cm. Again seen is the septated cystic lesion involving the right ovary.  The larger cystic components are not significantly changed since the prior study measuring 3.2 x 3.2 x 2.2 cm, 1.7 x 1.5 x 1.6 cm and 2.1 x 1.8 x 1.9 cm. No definite Doppler flow is demonstrated within the septations. The left ovary is not visualized.     Elected for conservative management until 4/2015 when she had increasing right groin pain. Thought it was secondary to a cardiac endovascular stent that was placed in 2010 through the same area.     6/1/15: CT Abdomen/Pelvis: Right adnexal mass slightly enlarged 3.8 x 4.1 x 6.3cm, known myomatous uterus.     6/12/15: CA-125 7.    6/16/15: Pelvic US: right adnexa 5.3 x 4.4 x 3.6cm with multiple contiguous cysts vs a multilocular cyst.      7/13/15: Referral to Dr Camejo. Asymptomatic from cyst. Elected for observation over surgery given age and h/o MI and stent, with repeat exam in 1yr with general OB/Gyn.    6/7/18: Pelvis US - Normal ovarian tissue is not seen, however there is a septated cyst within the right adnexa measuring 6.3 x 4.8 x 4.7 cm (4.7 x 4.0 x 4.9 cm on a previous outside CT). This has thin internal septation with no calcification. There is likely normal ovarian tissue surrounding the periphery, however this is not for certain.    7/2/18: Saw generalist OB Gyn and expressed desire to discuss again with GynOnc given new daily symptoms.    We discussed her options and she elected observation.  Rocky did well until late 2020 during which she began to note some RLQ pain resulting in a CT scan    11/18/2020 CT:  IMPRESSION:   1.  Clonic diverticulosis. No evidence of acute inflammatory process nor obstruction. Patient has had previous appendectomy and cholecystectomy.  2.  Interval enlargement of bilateral unilocular ovarian cysts which were previously demonstrated to be mildly complex on ultrasound from 2018. The right ovarian cyst measures 8 cm and the left ovarian cyst measures 4.6 cm. If no surgical intervention, these could be followed up with  ultrasound.  3.  3.1 cm saccular infrarenal aortic aneurysm previously measured 2.9 cm in 2018 when measured similarly.  4.  Minimal increase in unilocular 1.4 cm cyst within the pancreatic tail which could represent IPMN.  5.  Stable hepatic and renal cysts.    She reports that the pain has been worse of late - but that it is not bothering her at all at present      3/6/21 L/S BSO:    FINAL DIAGNOSIS:   A. OVARY AND FALLOPIAN TUBE, RIGHT, SALPINGO-OOPHORECTOMY:   - Well differentiated neuroendocrine tumor (carcinoid tumor), two nodules   (0.3 and 0.2 cm), please see comment   - Background of simple benign serous cyst (7.5 cm)   - Simple benign paratubal cysts   - Fallopian tube with serosal inflammation and mesothelial cell   hyperplasia     B.  FALLOPIAN TUBE AND OVARY, LEFT, SALPINGO-OOPHORECTOMY:   - Ovary with a simple benign serous cyst (2.4 cm)   - Simple benign paratubal cyst   - Fallopian tube with serosal inflammation and mesothelial cell   hyperplasia     COMMENT:   The final diagnosis confirms the interpretation provided intraoperatively.   There are two tumor nodules in the wall of an otherwise, benign right   adnexal cyst.   Mitotic rate:               < 2mitoses/2 mm2   Ki-67 labeling index     :     < 3%     There are several potential explanations for a carcinoid tumor in an   ovarian cyst.  While this could have   originated as part of a pre-existing teratoma (where other teratomatous   components regressed over time), it   could also represent metastatic spread from a carcinoid tumor elsewhere in    the body (e.g. gastrointestinal   tract).         Past Medical History:  Past Medical History:   Diagnosis Date     Breast cancer (H)     left, s/p lumpectomy     CAD (coronary artery disease)     MI 2010 s/p stent     Diastolic dysfunction      Diverticulosis      HLD (hyperlipidemia)      HTN (hypertension)      Hx SBO      Right ovarian cyst      STEMI (ST elevation myocardial infarction) (H)  10/28/2010    inferior     Urinary incontinence        Past Surgical History:  Past Surgical History:   Procedure Laterality Date     APPENDECTOMY       AS CORNEAL TRANSPLANT,LAMELLAR       CHOLECYSTECTOMY       FRACTURE TX, ANKLE RT/LT       LAPAROSCOPIC SALPINGO-OOPHORECTOMY Bilateral 3/26/2021    Procedure: Laparoscopic BSO. Cystoscopy;  Surgeon: Juanjose Camejo MD;  Location: [facility] OR     LIGATE VEIN       LUMPECTOMY BREAST Left      AK PATIENT HAS A CORONARY ARTERY STENT  2010     US BREAST BIOPSY LT         Health Maintenance:  Health Maintenance Due   Topic Date Due     DTAP/TDAP/TD IMMUNIZATION (1 - Tdap) Never done     ZOSTER IMMUNIZATION (1 of 2) Never done     MEDICARE ANNUAL WELLNESS VISIT  Never done     FALL RISK ASSESSMENT  Never done     Pneumococcal Vaccine: 65+ Years (1 of 1 - PPSV23) Never done     ADVANCE CARE PLANNING  08/11/2016     PHQ-2  Never done       Last Pap Smear: NO h/o abnormal, last a few years ago    Last Mammogram: 5/22/18              Result: BIRADS 2 benign      She has had a history of abnormal mammograms - breast cancer s/p lumpectomy in 2017, did not tolerate anastrazole.    Last Colonoscopy: 10 years ago, different doctors have told her she does and doesn't need one, will discuss further with PCP                          Current Medications:   has a current medication list which includes the following prescription(s): aspirin, carvedilol, diltiazem er coated beads, ferrous gluconate, multi-vitamins, olmesartan, pravastatin, and valsartan.     Allergies:   Allergies   Allergen Reactions     Amlodipine Cough, Swelling and Other (See Comments)     PN: swollen tongue, irritated throat     Anastrozole Palpitations     High b/p, headache     Lisinopril Anaphylaxis, Shortness Of Breath and Other (See Comments)     Losartan Swelling     PN: severe leg swelling     Sulfa Drugs Swelling and Anaphylaxis     Throat swelling     Thiazide-Type Diuretics Other (See Comments)      Shakes and chills.  Shakes and chills.     Coffee Bean Extract  [Coffea Arabica]      Other reaction(s): Other (See Comments)  PN: decaff coffee     Furosemide Swelling     Hydrochlorothiazide Swelling     Metolazone Other (See Comments)     PN: Trouble breathing and tremors     Morphine GI Disturbance     No Clinical Screening - See Comments Other (See Comments)     PN: decaff coffee       Social History:   Social History     Tobacco Use     Smoking status: Never Smoker     Smokeless tobacco: Never Used   Substance Use Topics     Alcohol use: Yes     Alcohol/week: 1.0 standard drinks     Types: 1 Cans of beer per week     Comment: 3 times a year       History   Drug Use No     Family History:   No family h/o cancers    Physical Exam:     PS 1  VS: BP (!) 166/66   Pulse 83   Temp 98.2  F (36.8  C) (Oral)   Wt 60.8 kg (134 lb)   SpO2 97%   BMI 23.74 kg/m       General Appearance: healthy and alert, no distress     HEENT:  no thyromegaly, no palpable nodules or masses       Musculoskeletal: extremities non tender and without edema    Skin: no lesions or rashes     Neurological: normal gait, no gross defects     Psychiatric: appropriate mood and affect                               Hematological: normal cervical, supraclavicular and inguinal lymph nodes     Gastrointestinal:       abdomen soft, non-tender, non-distended, incisions well healed without infection or herniation  (left tender, appropriate)    Genitourinary: none      Assessment:    Angelica Toro is a woman with a small carcinoid in a larger cystadenoma    A total of 25 minutes was spent with the patient, 15 minutes of which were spent in counseling the patient and/or treatment planning.      Plan:     1.)   Mass - we have discussed the clinical and pathologic findings.  The carcinoid tumor was unexpected and not identified at surgery.  WE have therefore discussed the implications and options at length.  She is aware that this tumor may have originated  from the ovary, but more typically initiates in the GI tract.  She is not having carcinoid symptoms with the exception of irregular heart rate which has been of long term duration.  As asymptomatic carcinoid tumors are most often followed without chemo, we have discussed 2 options    a.  Observation with evaluation upon the development of symptoms (she has intermittent abdominal pain in the past which was attributed to possible torsion, but which could reflect the effect of mass on her bowels).    b. Referral to GI for possible octreotide or other scan or bowel evaluation. This may clarify the situation, but may or may not precipitate any treatment.    After discussing the pro's and con's of each approach she is inclined to observe for now - which I think is reasonable.  WE discussed the signs/symptoms fo carcinoid syndrome, and I have given her a copy of her pathology report to share with her primary team.    No other follow-up planned here      2.) Genetic risk factors were assessed and the patient does not meet the qualifications for a referral.      3.) Labs and/or tests ordered include: none.     4.) Health maintenance issues addressed today include none.      Thank you for allowing us to participate in the care of your patient.       Sincerely,    Juanjose Camejo      CC  Patient Care Team:  Loly Means as PCP - General (OB/Gyn)  Juanjose Camejo MD as Assigned Cancer Care Provider  LOLY MEANS

## 2021-04-12 NOTE — NURSING NOTE
"Oncology Rooming Note    April 12, 2021 11:02 AM   Angelica Toro is a 89 year old female who presents for:    Chief Complaint   Patient presents with     Oncology Clinic Visit     P/O  Laparoscopic BSO. Cystoscopy     Initial Vitals: BP (!) 166/66   Pulse 83   Temp 98.2  F (36.8  C) (Oral)   Wt 60.8 kg (134 lb)   SpO2 97%   BMI 23.74 kg/m   Estimated body mass index is 23.74 kg/m  as calculated from the following:    Height as of 3/26/21: 1.6 m (5' 3\").    Weight as of this encounter: 60.8 kg (134 lb). Body surface area is 1.64 meters squared.  No Pain (0) Comment: Data Unavailable   No LMP recorded. Patient is postmenopausal.  Allergies reviewed: Yes  Medications reviewed: Yes    Medications: Medication refills not needed today.  Pharmacy name entered into EPIC:    [facility] [place], [place] - [address] [place] [address] [id number]  [facility] [address] [place] [address] [facility]    Clinical concerns: NONE       Rose Kwan CMA              "

## 2021-04-12 NOTE — LETTER
2021         RE: Angelica Toro  [address] [facility] [address]        Dear Colleague,    Thank you for referring your patient, Angelica Toro, to the [facility]. Please see a copy of my visit note below.                            Consult Notes on Referred Patient      Dr. Loly Sy  [facility]  [address] [place] [address]       RE: Angelica Toro  : 10/2/1929  LENCHO: 2021     Dear Dr. Loly INTERIANO Yossi:    I had the pleasure of seeing your patient Angelica Toro here at the Gynecologic Cancer Clinic at the [facility] on 2018.  As you know she is a very pleasant 89 year old woman with a known complex right adnexal mass that has been stable over 5yrs ([id number]), but recently has increased in size and she is now having daily pelvic pain.  Given these findings she was subsequently sent to the Gynecologic Cancer Clinic for new patient consultation.     She reports 3 years of intermittent RLQ pain. It seems to be getting worse over time and rates up 8/10 pain at times. It is worse when sweeping but walking sometimes makes it better and sometimes worse.     H/o Illness:  Brief history of disease:  2010: US  Uterus measures: 7.6 x 6.3 x 3.5 cm.  Endometrial stripe measures: Obscured by the presence of a mass. Ill-defined borders of an area of mixed echogenicity partly within the myometrium and extending into what appears to be the endometrium measuring 4.7 x 3.7 x 3.1 cm. While this could represent a submucosal fibroid the margins are somewhat ill-defined and other etiologies are not excluded. The second 3.9 cm hypoechoic mass at the uterine fundus most compatible with a fibroid. Free fluid: None. Right ovary: 4.7 x 3.1 x 3.7 cm. Multiple anechoic areas ranging in size from 3 cm to 1.5 cm compatible with multiple cyst. Left ovary: 1.5 x 1.3 x 0.9 cm.     2010: CA-125 8, AFP 2.3, inhibin A: < 1.0, beta hcg 2, CEA 1.2. Endometrial  biopsy showed endocervical polyp with fragmented benign endocervical tissue.    8/2010 US:  No interval change in the size of the right ovary cystic lesion. Measures 4.3 x 3.4 x 3.0 cm. Again seen is the septated cystic lesion involving the right ovary. The larger cystic components are not significantly changed since the prior study measuring 3.2 x 3.2 x 2.2 cm, 1.7 x 1.5 x 1.6 cm and 2.1 x 1.8 x 1.9 cm. No definite Doppler flow is demonstrated within the septations. The left ovary is not visualized.     Elected for conservative management until 4/2015 when she had increasing right groin pain. Thought it was secondary to a cardiac endovascular stent that was placed in 2010 through the same area.     6/1/15: CT Abdomen/Pelvis: Right adnexal mass slightly enlarged 3.8 x 4.1 x 6.3cm, known myomatous uterus.     6/12/15: CA-125 7.    6/16/15: Pelvic US: right adnexa 5.3 x 4.4 x 3.6cm with multiple contiguous cysts vs a multilocular cyst.      7/13/15: Referral to Dr Camejo. Asymptomatic from cyst. Elected for observation over surgery given age and h/o MI and stent, with repeat exam in 1yr with general OB/Gyn.    6/7/18: Pelvis US - Normal ovarian tissue is not seen, however there is a septated cyst within the right adnexa measuring 6.3 x 4.8 x 4.7 cm (4.7 x 4.0 x 4.9 cm on a previous outside CT). This has thin internal septation with no calcification. There is likely normal ovarian tissue surrounding the periphery, however this is not for certain.    7/2/18: Saw generalist OB Gyn and expressed desire to discuss again with GynOnc given new daily symptoms.    We discussed her options and she elected observation.  Rocky did well until late 2020 during which she began to note some RLQ pain resulting in a CT scan    11/18/2020 CT:  IMPRESSION:   1.  Clonic diverticulosis. No evidence of acute inflammatory process nor obstruction. Patient has had previous appendectomy and cholecystectomy.  2.  Interval enlargement of bilateral  unilocular ovarian cysts which were previously demonstrated to be mildly complex on ultrasound from 2018. The right ovarian cyst measures 8 cm and the left ovarian cyst measures 4.6 cm. If no surgical intervention, these could be followed up with ultrasound.  3.  3.1 cm saccular infrarenal aortic aneurysm previously measured 2.9 cm in 2018 when measured similarly.  4.  Minimal increase in unilocular 1.4 cm cyst within the pancreatic tail which could represent IPMN.  5.  Stable hepatic and renal cysts.    She reports that the pain has been worse of late - but that it is not bothering her at all at present      3/6/21 L/S BSO:    FINAL DIAGNOSIS:   A. OVARY AND FALLOPIAN TUBE, RIGHT, SALPINGO-OOPHORECTOMY:   - Well differentiated neuroendocrine tumor (carcinoid tumor), two nodules   (0.3 and 0.2 cm), please see comment   - Background of simple benign serous cyst (7.5 cm)   - Simple benign paratubal cysts   - Fallopian tube with serosal inflammation and mesothelial cell   hyperplasia     B.  FALLOPIAN TUBE AND OVARY, LEFT, SALPINGO-OOPHORECTOMY:   - Ovary with a simple benign serous cyst (2.4 cm)   - Simple benign paratubal cyst   - Fallopian tube with serosal inflammation and mesothelial cell   hyperplasia     COMMENT:   The final diagnosis confirms the interpretation provided intraoperatively.   There are two tumor nodules in the wall of an otherwise, benign right   adnexal cyst.   Mitotic rate:               < 2mitoses/2 mm2   Ki-67 labeling index     :     < 3%     There are several potential explanations for a carcinoid tumor in an   ovarian cyst.  While this could have   originated as part of a pre-existing teratoma (where other teratomatous   components regressed over time), it   could also represent metastatic spread from a carcinoid tumor elsewhere in    the body (e.g. gastrointestinal   tract).         Past Medical History:  Past Medical History:   Diagnosis Date     Breast cancer (H)     left, s/p lumpectomy      CAD (coronary artery disease)     MI 2010 s/p stent     Diastolic dysfunction      Diverticulosis      HLD (hyperlipidemia)      HTN (hypertension)      Hx SBO      Right ovarian cyst      STEMI (ST elevation myocardial infarction) (H) 10/28/2010    inferior     Urinary incontinence        Past Surgical History:  Past Surgical History:   Procedure Laterality Date     APPENDECTOMY       AS CORNEAL TRANSPLANT,LAMELLAR       CHOLECYSTECTOMY       FRACTURE TX, ANKLE RT/LT       LAPAROSCOPIC SALPINGO-OOPHORECTOMY Bilateral 3/26/2021    Procedure: Laparoscopic BSO. Cystoscopy;  Surgeon: Juanjose Camejo MD;  Location: [facility] OR     LIGATE VEIN       LUMPECTOMY BREAST Left      KS PATIENT HAS A CORONARY ARTERY STENT  2010     US BREAST BIOPSY LT         Health Maintenance:  Health Maintenance Due   Topic Date Due     DTAP/TDAP/TD IMMUNIZATION (1 - Tdap) Never done     ZOSTER IMMUNIZATION (1 of 2) Never done     MEDICARE ANNUAL WELLNESS VISIT  Never done     FALL RISK ASSESSMENT  Never done     Pneumococcal Vaccine: 65+ Years (1 of 1 - PPSV23) Never done     ADVANCE CARE PLANNING  08/11/2016     PHQ-2  Never done       Last Pap Smear: NO h/o abnormal, last a few years ago    Last Mammogram: 5/22/18              Result: BIRADS 2 benign      She has had a history of abnormal mammograms - breast cancer s/p lumpectomy in 2017, did not tolerate anastrazole.    Last Colonoscopy: 10 years ago, different doctors have told her she does and doesn't need one, will discuss further with PCP                          Current Medications:   has a current medication list which includes the following prescription(s): aspirin, carvedilol, diltiazem er coated beads, ferrous gluconate, multi-vitamins, olmesartan, pravastatin, and valsartan.     Allergies:   Allergies   Allergen Reactions     Amlodipine Cough, Swelling and Other (See Comments)     PN: swollen tongue, irritated throat     Anastrozole Palpitations     High b/p, headache      Lisinopril Anaphylaxis, Shortness Of Breath and Other (See Comments)     Losartan Swelling     PN: severe leg swelling     Sulfa Drugs Swelling and Anaphylaxis     Throat swelling     Thiazide-Type Diuretics Other (See Comments)     Shakes and chills.  Shakes and chills.     Coffee Bean Extract  [Coffea Arabica]      Other reaction(s): Other (See Comments)  PN: decaff coffee     Furosemide Swelling     Hydrochlorothiazide Swelling     Metolazone Other (See Comments)     PN: Trouble breathing and tremors     Morphine GI Disturbance     No Clinical Screening - See Comments Other (See Comments)     PN: decaff coffee       Social History:   Social History     Tobacco Use     Smoking status: Never Smoker     Smokeless tobacco: Never Used   Substance Use Topics     Alcohol use: Yes     Alcohol/week: 1.0 standard drinks     Types: 1 Cans of beer per week     Comment: 3 times a year       History   Drug Use No     Family History:   No family h/o cancers    Physical Exam:     PS 1  VS: BP (!) 166/66   Pulse 83   Temp 98.2  F (36.8  C) (Oral)   Wt 60.8 kg (134 lb)   SpO2 97%   BMI 23.74 kg/m       General Appearance: healthy and alert, no distress     HEENT:  no thyromegaly, no palpable nodules or masses       Musculoskeletal: extremities non tender and without edema    Skin: no lesions or rashes     Neurological: normal gait, no gross defects     Psychiatric: appropriate mood and affect                               Hematological: normal cervical, supraclavicular and inguinal lymph nodes     Gastrointestinal:       abdomen soft, non-tender, non-distended, incisions well healed without infection or herniation  (left tender, appropriate)    Genitourinary: none      Assessment:    Angelica Toro is a woman with a small carcinoid in a larger cystadenoma    A total of 25 minutes was spent with the patient, 15 minutes of which were spent in counseling the patient and/or treatment planning.      Plan:     1.)   Mass - we  have discussed the clinical and pathologic findings.  The carcinoid tumor was unexpected and not identified at surgery.  WE have therefore discussed the implications and options at length.  She is aware that this tumor may have originated from the ovary, but more typically initiates in the GI tract.  She is not having carcinoid symptoms with the exception of irregular heart rate which has been of long term duration.  As asymptomatic carcinoid tumors are most often followed without chemo, we have discussed 2 options    a.  Observation with evaluation upon the development of symptoms (she has intermittent abdominal pain in the past which was attributed to possible torsion, but which could reflect the effect of mass on her bowels).    b. Referral to GI for possible octreotide or other scan or bowel evaluation. This may clarify the situation, but may or may not precipitate any treatment.    After discussing the pro's and con's of each approach she is inclined to observe for now - which I think is reasonable.  WE discussed the signs/symptoms fo carcinoid syndrome, and I have given her a copy of her pathology report to share with her primary team.    No other follow-up planned here      2.) Genetic risk factors were assessed and the patient does not meet the qualifications for a referral.      3.) Labs and/or tests ordered include: none.     4.) Health maintenance issues addressed today include none.      Thank you for allowing us to participate in the care of your patient.       Sincerely,    Juanjose MIKE  Patient Care Team:  YossiLoly as PCP - General (OB/Gyn)

## 2021-06-09 NOTE — CONSULTS
[facility] Hematology and Oncology Consult Note    Patient: Angelica Toro  MRN: [id number]  Date of Service: 03/27/2017        Reason for Visit    I was consulted by Dr. Lopez regarding left breast cancer.    Assessment/Plan    Ms. Angelica Toro is a 87 y.o. woman who had a palpable breast mass in the left breast medial lower quadrant since November 2016.  She had a lumpectomy done on 3/10/17.  Final pathology showed a 1.4 cm, grade 2 infiltrating ductal carcinoma, ER TX positive and HER-2 negative. ( In fact IHC was 2+ for her to and she had reflux fish done which showed a HER-2/PIYUSH 17 ratio of 1.1 and so HER-2 negative).  I have gone through her medical records from [facility] and also her recent medical records from [facility].  I have reviewed her labs and the pathology and imaging.  She does have a past medical history of coronary artery disease and mild to moderate mitral regurgitation.  She has a complex right ovarian cyst that was first detected in 2009 and followed annually by OB/GYN with a transvaginal ultrasound and CA-125 level.  She also has a history of recurrent small bowel obstruction.    1.  Today I went through the pathology results of the breast cancer with the patient and her daughter in detail.  She has recovered very well after the surgery.  I discussed with her that she had a stage IA breast cancer.  I discussed with her about the possibility of recurrence with the aid of the [employer] predict online program.  I discussed with her that given her age radiation can be considered and it may reduce the risk of local recurrence but the last studies have not shown any improvement in overall survival.  If she is willing to take hormone blocking treatment and if this is according to her wishes then we can avoid adjuvant radiation.  The patient was very happy with this.  She wants minimal intervention.  I discussed with her about the benefit that may accrue with adjuvant chemotherapy.  The possible  benefit will be less than 1% in 5 years time in terms of improvement in overall survival.  She did not think that it is worth going for chemotherapy given the possible side effects and I agree with her.    Strongly recommended taking anti-hormonal therapy.  Since that she is postmenopausal I would strongly recommend going for an aromatase inhibitor rather than tamoxifen.  Another reason would be the slightly decreased risk of blood clots with an aromatase inhibitor compared to tamoxifen.  She was agreeable with that.  I have gone through the side effects of aromatase inhibitors in detail with her.  I have given her printed information about anastrozole from the [employer] database to read.  She said that she is willing to take it for about a month and see how she feels.  If she does not feel too bad with it then she is willing to continue it long-term.  I discussed with her that the recommendation would be to take 1 mg of anastrozole daily for at least 5 years.  I have sent a prescription for anastrozole to the pharmacy for 30 days.  When she comes back if she is satisfied with that then we can change it to a 90 day prescription.    2.  I discussed with her about preventing osteoporosis when she is on anastrozole.  She is adamant that she is not willing to take calcium and vitamin D tablets.  She says that she does not want to take any more tablets.  She says that she drinks a lot of diary products and that should be enough.  I encouraged her to keep physically active and go for walks.  We will plan for a DEXA scan in 2 years time.  Hopefully she will be persuaded.    3.  I recommended to her that she should continue with the baby aspirin daily that she is taking for her heart.  This will have some protective effect from blood clots.    4.  We discussed about annual mammograms.  At this point she does not really want to do anymore mammograms.  She would prefer to continue with clinical follow-up only.  As mentioned  above she wants minimal intervention.    5.  We discussed about the recurrent bowel obstruction problems that she has had.  I suspect that this may be from some adhesions or from diverticulosis.  I recommended a referral to gastroenterology for this and she was agreeable.  A referral order was placed.    6.  I discussed with her about follow-up about the right sided ovarian cyst.  She says that she wants to continue with conservative management only.  She would like to discuss this with Dr. Jackson and make a decision where she should go for follow-up.  I will defer to Dr. Jackson.    7.  Return to clinic with me or Nasim Michelle NP in 3-4 weeks with a CBC differential platelets and CMP.  If she is doing well at that time we can go to 3 monthly follow-up.      ECOG Performance   ECOG Performance Status: 0  Distress Assessment  Distress Assessment Score: 1        Problem List    1. Malignant neoplasm of lower-inner quadrant of left female breast  anastrozole (ARIMIDEX) 1 mg tablet    HM1(CBC and Differential)    Comprehensive Metabolic Panel   2. Small bowel obstruction  Ambulatory referral to Gastroenterology   3. Right ovarian cyst             CC: MD Suellen Rojas MD      ______________________________________________________________________________      Staging History    Malignant neoplasm of lower-inner quadrant of left female breast    Staging form: Breast, AJCC 7th Edition      Clinical: Stage IA (T1c, N0, M0) - Signed by Neeta Zapien MD on 3/27/2017      History    Ms. Angelica Toro is a 87-year-old woman who is here for the consultation with her daughter.    She changed her primary care from [facility] to [facility] early this year.  When she saw Dr. Larios for the first time on 2/8/17 she mentioned that that she had a lump in the left breast lower inner quadrant since November 2016.  The diagnostic mammogram and ultrasound showed a 1 centimeter mass on the medial aspect of the  left breast consistent with malignancy.  She had an ultrasound-guided biopsy done on 2/24/17 which showed an invasive ductal carcinoma that was ER AL positive and HER-2 negative.  She had a lumpectomy done on 3/10/17 by Dr. Lopez.  Clinically there was no axillary node involvement so it was decided not to do a sentinel lymph node biopsy based on her age.    She says that she did very well after surgery.  She has healed very nicely.  She has no pain at the site.  She would like to discuss about adjuvant treatment.  She is very clear that she wants minimal intervention.  She says that she really would not want to take any more medications if possible.  She says that she is already 87 years old and she really does not want to leave for another 10 years.  She says that she feels ready to go when the Lord calls.    She now lives in a town home in [place] by herself.  Her daughters live nearby.  She says that she usually goes for walks.  She intends to restart doing that as the weather has improved.  She is able to take care of all the work needed at home and her shopping.  She says that perhaps she is not as good with cleaning as she was before.  She has not needed to offer any help.    She has a history of 4 admissions for bowel obstruction in the last 5 years.  The last one was to [facility] on 12/21/16.  The obstruction was relieved by conservative management.  And then she has a bit of left-sided lower abdominal pain that comes and goes.  She says that she has normal bowel movements and is not having any constipation or diarrhea.  No blood in stool or black stools.    She has a history of a complex right ovarian cyst which has been managed conservatively.  She was being followed with gynecology in [facility]3].  They were doing an annual transvaginal ultrasound and a checking a  level.  She did go for a second opinion to [facility] and apparently had a transvaginal ultrasound done in August 2016.  Apparently  they agreed with the plan for conservative management.    No fevers or night sweats.  No new lumps or bumps anywhere.  No unusual headaches.  No mouth sores.  No nausea or vomiting or abdominal pain.  Weight remains stable.  Breathing is fine.  No chest pain or cough.  No vaginal discharge.  No difficulty passing urine.  No skin rashes.  She has a few keratosis on her back.  She has chronic ankle edema right greater than left.    Please see below.  A 14 point review of system is otherwise completely negative.    Menstrual history: Menarche at the age of 11.  LMP when she was 53 of 54 years of age.  She has had 6 pregnancies and 5 deliveries.  Age of the first pregnancy was 20.  She breast-fed.  No history of hormone replacement therapy.        Past History  Past Medical History:   Diagnosis Date     Breast cyst     Left     Colon polyps      Coronary artery disease     s/p MI 10/2010, single vessel disease on cath, SREEDHAR to mid-circ.     Diverticulosis      Fuchs' corneal dystrophy      Hyperlipidemia      Hypertension, goal below 140/90 12/22/2016     Mitral regurgitation     Mild to moderate.     Partial small bowel obstruction 12/22/2016    thought to be due to adhesions. Treated conservatively.     Right ovarian cyst 2009    Followed annually with imaging.     MANAN (stress urinary incontinence, female)      Varicose veins of legs      Past Surgical History:   Procedure Laterality Date     ANKLE FRACTURE SURGERY Right 1995     APPENDECTOMY  1975     BREAST LUMPECTOMY Left 03/10/2017     CATARACT EXTRACTION Bilateral 2007     CHOLECYSTECTOMY  1975     CORNEAL TRANSPLANT Bilateral 2011     CORONARY ANGIOPLASTY WITH STENT PLACEMENT  2010     VEIN SURGERY Right 1980     Family History   Problem Relation Age of Onset     Breast cancer Daughter 50     Breast cancer Niece      Hypertension Mother      Uterine cancer Mother      Prostate cancer Father      Cancer Sister      small intestinal cancer.     No Medical Problems  Son      passed away in an accident.     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     Social History Main Topics     Smoking status: Never Smoker     Smokeless tobacco: Never Used     Alcohol use No     Drug use: No     Sexual activity: No     Other Topics Concern     None     Social History Narrative     Allergies    Allergies   Allergen Reactions     Amlodipine Other (See Comments)     PN: swollen tongue, irritated throat     Lisinopril Anaphylaxis     Losartan Swelling     PN: severe leg swelling     Sulfa (Sulfonamide Antibiotics) Swelling     Throat swelling     Sulfacetamide Sodium Anaphylaxis     Thiazides Other (See Comments)     Shakes and chills.     Coffee Other (See Comments)     PN: decaff coffee       Review of Systems    General  General (WDL): All general elements are within defined limits  ENT  ENT (WDL): Exceptions to WDL  Changes in vision: Yes - Chronic (Greater than 3 months)  Glasses or Contacts: None  Hearing loss: None  Hearing Aids: None  Tinnitus: None  Pain/Pressure in ears: None  Sinus Congestion/Drainage: None  Hoarseness: None  Sore Throat: None  Dental Problems: None  Dentures: None  Respiratory  Respiratory (WDL): All respiratory elements are within defined limits  Cardiovascular  Cardiovascular (WDL): All cardiovascular elements are within defined limits  Endocrine  Endocrine (WDL): All endocrine elements are within defined limits  Gastrointestinal  Gastrointestinal (WDL): Exceptions to WDL  Difficulty Swallowing: None  Heartburn: None  Constipation: None  Yellowish skin and/or eyes: None  Blood from rectum: None  Diarrhea: None  Have had black or tan stools?:  "None  Hemorrhoids: None  Poor Appetite: None  Musculoskeletal  Musculoskeletal (WDL): Exceptions to WDL  Range of Motion Limitation: None  Joint pain: Yes - Chronic (Greater than 3 months) (OA hands, knees)  Back Pain: None  Activity Assistance: None  Difficulty to lie flat for more than 30 minutes: None  Pain interfering with walking: None  Muscle pain or stiffness: None  Recent fall: None  Assistive device: None  Neurological  Neurological (WDL): All neurological elements are within defined limits  Psychological/Emotional  Psychological/Emotional (WDL): All psychological/emotional elements are within defined limits  Hematological/Lymphatic  Hematological/Lymphatic (WDL): All hematological/lymphatic elements are within defined limits  Dermatological  Dermatologic (WDL): All dermatological elements are within defined limits  Genitourinary/Reproductive  Genitourinary/Reproductive (WDL): Exceptions to WDL  Urinary Frequency: Yes - Chronic (Greater than 3 months)  Urinary Incontinence: None  Painful urination: None  Urination more than 2 times a night: Yes - Chronic (Greater than 3 months)  Urinary Urgency: None  Difficulty Initiating Urine Stream: None  Blood in urine: None (UA shows microscopic per pt)  Sensation of incomplete emptying of bladder: None  Sexual concerns: None  Reproductive (Females only)     Pain  Currently in Pain: No/denies    Physical Exam    Recent Vitals 3/27/2017   Height 5' 4\"   Weight 152 lbs   BSA (m2) 1.76 m2   /63   Pulse 75   SpO2 97       GENERAL: Alert and oriented. Seated comfortably. In no distress.  She looks very good for her age.    HEAD: Atraumatic and normocephalic.  Has a full head of hair.    EYES: DESTIN, EOMI.  No pallor.  No icterus.    Oral cavity: no mucosal lesion or tonsillar enlargement.    NECK: supple. JVP normal.  No thyroid enlargement.    LYMPH NODES: No palpable, cervical, axillary or inguinal lymphadenopathy.    CHEST: clear to auscultation " bilaterally.  Resonant to percussion throughout bilaterally.  Symmetrical breath movements bilaterally.    CVS: S1 and S2 are heard. Regular rate and rhythm.  No murmur or gallop or rub heard.    ABDOMEN: Soft. Not tender. Not distended.  No palpable hepatomegaly or splenomegaly.  No other mass palpable.  Bowel sounds heard.    EXTREMITIES: Warm.  No peripheral edema.    SKIN: no rash, or bruising or purpura.  Few lesions of senile keratosis on the back.    BREASTS:  Right breast: Contour looks normal.  Skin looks normal.  Nipple looks normal.  No palpable mass.  Right axilla without mass or nodule.    Left breast: Controlled looks normal.  Skin looks normal.  The surgical scar has healed well.  Nipple looks normal.  No palpable mass.  Left axilla without mass or nodule.      Lab Results  Results for EULOGIO BINGHAM (MRN [id number]) as of 3/27/2017 11:11   Ref. Range 3/8/2017 09:34   WBC Latest Ref Range: 4.0 - 11.0 thou/uL 5.1   RBC Latest Ref Range: 3.80 - 5.40 mill/uL 4.61   Hemoglobin Latest Ref Range: 12.0 - 16.0 g/dL 12.1   Hematocrit Latest Ref Range: 35.0 - 47.0 % 36.9   MCV Latest Ref Range: 80 - 100 fL 80   MCH Latest Ref Range: 27.0 - 34.0 pg 26.3 (L)   MCHC Latest Ref Range: 32.0 - 36.0 g/dL 32.9   RDW Latest Ref Range: 11.0 - 14.5 % 12.5   Platelets Latest Ref Range: 140 - 440 thou/uL 322   MPV Latest Ref Range: 7.0 - 10.0 fL 8.2     Results for EULOGIO BINGHAM (MRN [id number]) as of 3/27/2017 11:11   Ref. Range 12/22/2016 06:43 12/22/2016 12:34 12/23/2016 06:12 12/23/2016 06:12 3/8/2017 09:34   Sodium Latest Ref Range: 136 - 145 mmol/L 133 (L)  137  135 (L)   Potassium Latest Ref Range: 3.5 - 5.0 mmol/L 3.5 4.2 3.8 3.9 4.5   Chloride Latest Ref Range: 98 - 107 mmol/L 101  106  100   CO2 Latest Ref Range: 22 - 31 mmol/L 24  24  27   Anion Gap, Calculation Latest Ref Range: 5 - 18 mmol/L 8  7  8   BUN Latest Ref Range: 8 - 28 mg/dL 10  15  13   Creatinine Latest Ref Range: 0.60 - 1.10 mg/dL 0.62  0.61  0.70    GFR MDRD Af Amer Latest Ref Range: >60 mL/min/1.73m2 >60  >60  >60   GFR MDRD Non Af Amer Latest Ref Range: >60 mL/min/1.73m2 >60  >60  >60   Calcium Latest Ref Range: 8.5 - 10.5 mg/dL 8.7  8.4 (L)  9.4   AST Latest Ref Range: 0 - 40 U/L 14       ALT Latest Ref Range: 0 - 45 U/L 15       ALBUMIN Latest Ref Range: 3.5 - 5.0 g/dL 3.3 (L)       Protein, Total Latest Ref Range: 6.0 - 8.0 g/dL 6.7       Alkaline Phosphatase Latest Ref Range: 45 - 120 U/L 94       Bilirubin, Total Latest Ref Range: 0.0 - 1.0 mg/dL 0.5       Magnesium Latest Ref Range: 1.8 - 2.6 mg/dL   2.3     Glucose Latest Ref Range: 70 - 125 mg/dL 156 (H)  90  79   Lactic Acid Latest Ref Range: 0.5 - 2.2 mmol/L 1.2       Lipase Latest Ref Range: 0 - 52 U/L 21         Pathology from the breast biopsy dated 2/24/17 and from the breast lumpectomy dated 3/10/17 were reviewed.    Imaging Results    MAMMO DIAGNOSTIC 2D BILATERAL, US BREAST LIMITED (FOCAL) LEFT  2/15/2017 2:11 PM     INDICATION: Lump medial left breast. Patient gives history of left lateral breast cysts.  COMPARISON: Outside images unavailable at time of dictation.     MAMMOGRAPHIC FINDINGS: Bilateral full-field digital diagnostic mammograms performed. There are scattered areas of fibroglandular density.  The medial left breast lump could not be adequately included on any mammogram view, including cleavage view due to its position.  Two Well-circumscribed small nodules present at 2:00 are visualized and may represent the patient's known cysts. Images evaluated with the assistance of CAD.      ULTRASOUND: Scans of the palpable lump medial left breast, 8:30 in zone 3 demonstrate a suspicious 1 cm spiculated hypoechoic nodule worrisome for malignancy.     Scans of the lateral aspect of the breast demonstrate 2 small benign breast cysts that correspond very well with the mammogram, the largest measures 8 x 6 mm.     IMPRESSION:   Palpable nodule medial aspect of left breast has an appearance  very consistent with a 1 cm malignancy. Ultrasound-guided biopsy recommended. This was discussed with the patient with follow-up biopsy procedure being scheduled.      Signed by: Neeta Zapien MD

## 2021-06-09 NOTE — PROGRESS NOTES
This is a 87 y.o.  female who I'm asked to see by Belinda Jackson MD for evaluation of a left breast cancer.  This was picked up by the patient.  She firly recently had a screening mammogram.  A needle biopsy was done which shows an invasive ductal carcinoma.  It is estrogen receptor positive, progesterone receptor positive and HER-2 negative.    She has a daughter with breast cancer but no past family history.      Please see the chart review for PMH, med list, allergies and social history.  Patient Active Problem List   Diagnosis     Small bowel obstruction     Hyponatremia     Hypokalemia     Periumbilical abdominal pain     Hypertension, goal below 140/90     Hypomagnesemia     Malignant neoplasm of lower-inner quadrant of left female breast     Dyslipidemia       Current Outpatient Prescriptions:      aspirin 81 MG EC tablet, Take 81 mg by mouth every other day., Disp: , Rfl:      atenolol (TENORMIN) 50 MG tablet, Take 1 tablet (50 mg total) by mouth bedtime., Disp: 30 tablet, Rfl: 11     diltiazem (CARDIZEM CD) 300 MG 24 hr capsule, Take 1 capsule (300 mg total) by mouth every morning., Disp: 30 capsule, Rfl: 11     multivitamin with minerals (THERA-M) 9 mg iron-400 mcg Tab tablet, Take 1 tablet by mouth 3 (three) times a week., Disp: , Rfl:      pravastatin (PRAVACHOL) 20 MG tablet, Take 1 tablet (20 mg total) by mouth every other day., Disp: 30 tablet, Rfl: 11     valsartan (DIOVAN) 80 MG tablet, Take 1 tablet (80 mg total) by mouth Daily after lunch., Disp: 30 tablet, Rfl: 11  Allergies   Allergen Reactions     Amlodipine Other (See Comments)     PN: swollen tongue, irritated throat     Lisinopril Anaphylaxis     Losartan Swelling     PN: severe leg swelling     Sulfa (Sulfonamide Antibiotics) Swelling     Throat swelling     Sulfacetamide Sodium Anaphylaxis     Thiazides Other (See Comments)     Shakes and chills.     Coffee Other (See Comments)     PN: decaff coffee         ROS:  A 12 point  comprehensive review of systems was negative except as noted.    Physical Exam  Visit Vitals     /78 (Patient Site: Right Arm, Patient Position: Sitting, Cuff Size: Adult Regular)     Pulse 71     Wt 152 lb (68.9 kg)     SpO2 96%     BMI 26.93 kg/m2     General:alert, appears stated age and cooperative  Breasts: Small mass at the extreme inferior medial left breast. Difficult to tell if fixed because of the location.  Lymph Nodes:no axillary nodes palpated  Neuro:Grossly normal      Reviewed her mammograms and ultrasound and pathology.     Impression: Left Breast Cancer.  Clinically T1, N0. Discussed the surgical options for treatment of breast cancer which generally are a lumpectomy (partial mastectomy) combined with radiation versus a mastectomy.  Explained that the survival benefit is the same for both.  The difference is in local recurrence risk.  The patient is a Excellent candidate for a lumpectomy. Because of her age I don't think we need to do a SLN biopsy and I doubt she will need radiation. If it is fixed to the chest wall however, then I will recommend radiation.      Plan: We'll schedule for a left partial mastectomy. This is typically an outpatient procedure under local MAC anesthetic.  The risks and benefits of surgery were explained.  Also talked about expected recovery time.

## 2021-06-09 NOTE — PROGRESS NOTES
Angelica is scheduled for left breast lumpectomy with Dr. Lopez on 3/10/17 at [facility]. Patient was given instructions of arrival time, need a , pre-op physical within 30days and NPO after midnight. Patient verbalized understanding.     Esmer Camarillo CMA  Physician    [facility]   [phone]

## 2021-06-09 NOTE — PROGRESS NOTES
Assessment/Plan:      Visit for Preoperative Exam.      History of skin/neck reaction (non-anaphylactic) with needle biopsy.    Discussed day of surgery medication recommendations.     No contraindication to surgery.  Able to perform >4 metabolic equivalents.  Heart disease has been stable.  No changes on EKG.    Patient approved for surgery with general or local anesthesia. Postoperative pain to be managed by surgeon during post-operative [employer] timeframe, typically 30-60 days for major surgery, and less for others. Labs will be done as indicated. Proceed with proposed surgery without additional clinical clarifications. No Cardiology consultation or non-invasive testing. Low Risk Surgery.     Subjective:     Chief Complaint   Patient presents with     Pre-op Exam     Scheduled Procedure: lump removal left breast   Surgery Date:  03/10/2017  Surgery Location:  [facility]   Surgeon:  Dr. Lopez    Current Outpatient Prescriptions   Medication Sig Dispense Refill     aspirin 81 MG EC tablet Take 81 mg by mouth every other day.       atenolol (TENORMIN) 50 MG tablet Take 1 tablet (50 mg total) by mouth bedtime. 30 tablet 11     diltiazem (CARDIZEM CD) 300 MG 24 hr capsule Take 1 capsule (300 mg total) by mouth every morning. 30 capsule 11     multivitamin with minerals (THERA-M) 9 mg iron-400 mcg Tab tablet Take 1 tablet by mouth 3 (three) times a week.       pravastatin (PRAVACHOL) 20 MG tablet Take 1 tablet (20 mg total) by mouth every other day. 30 tablet 11     valsartan (DIOVAN) 80 MG tablet Take 1 tablet (80 mg total) by mouth Daily after lunch. 30 tablet 11     No current facility-administered medications for this visit.        Allergies   Allergen Reactions     Amlodipine Other (See Comments)     PN: swollen tongue, irritated throat     Lisinopril Anaphylaxis     Losartan Swelling     PN: severe leg swelling     Sulfa (Sulfonamide Antibiotics) Swelling     Throat swelling      Sulfacetamide Sodium Anaphylaxis     Thiazides Other (See Comments)     Shakes and chills.     Coffee Other (See Comments)     PN: decaff coffee       Immunization History   Administered Date(s) Administered     Influenza high dose, seasonal 09/24/2014, 10/08/2015, 09/07/2016     Influenza, Seasonal, Inj PF 10/08/2007, 10/21/2009, 09/20/2010, 10/06/2011, 09/21/2012     Influenza,seasonal quad, PF, 36+MOS 10/31/2013     Pneumo Conj 13-V (2010&after) 05/22/2015     Pneumo Polysac 23-V 10/02/2004     Td, Adult, Absorbed 12/31/2005     Tdap 03/28/2014       Patient Active Problem List   Diagnosis     Small bowel obstruction     Hyponatremia     Hypokalemia     Periumbilical abdominal pain     Hypertension, goal below 140/90     Hypomagnesemia     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     Social History Main Topics     Smoking status: Never Smoker     Smokeless tobacco: Never Used     Alcohol use No     Drug use: No     Sexual activity: No     Other Topics Concern     Not on file     Social History Narrative       Past Surgical History:   Procedure Laterality Date     ANKLE FRACTURE SURGERY      1995  right      CHOLECYSTECTOMY       EYE SURGERY      cornea transplant bilateral      VEIN SURGERY      right leg        History of Present Illness: felt lump in breast.  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  Lumpectomy is potentially definitive.    Recent Health  Fever: no  Chills: no  Fatigue: no  Chest Pain: no  Cough: no  Dyspnea: no  Urinary Frequency: no  Nausea: no  Vomiting: no  Diarrhea: no  Abdominal Pain:  "no  Easy Bruising: yes  Lower Extremity Swelling: no  Poor Exercise Tolerance: no    Most recent Health Maintenance Visit:  5 year(s) ago    Pertinent History  Prior Anesthesia: yes  Previous Anesthesia Reaction:  Unclear.  Some type of skin reaction with needle biopsy.  Diabetes: no  Cardiovascular Disease: pt had heart attack (stent placed) in 2010  Pulmonary Disease: no  Renal Disease: no  GI Disease: no  Sleep Apnea: no  Thromboembolic Problems: no  Clotting Disorder: no  Bleeding Disorder: no  Transfusion Reaction: no history of blood tranfusion  Impaired Immunity: no  Steroid use in the last 6 months: no  Frequent Aspirin use: yes but holding for surgery     No family history of anesthesia reaction, clotting disorder and bleeding disorder    Social history of patient does not wear denture or partial plates and there are no concerns regarding care after surgery    After surgery, the patient plans to recover at home with family.    Review of Systems  Review of Systems   Constitutional: Negative.    HENT: Negative.    Eyes: Negative.    Respiratory: Negative.    Cardiovascular: Negative.    Gastrointestinal: Negative.    Endocrine: Negative.    Genitourinary: Negative.    Musculoskeletal: Negative.    Skin: Negative.    Neurological: Negative.    Hematological: Negative.    Psychiatric/Behavioral: Negative.              Objective:         Vitals:    03/08/17 0850   BP: 138/74   Pulse: 66   Resp: 18   Temp: 97.9  F (36.6  C)   TempSrc: Oral   SpO2: 98%   Weight: 149 lb (67.6 kg)   Height: 5' 4\" (1.626 m)       Physical Exam:  Physical Exam   Constitutional: She appears well-developed and well-nourished.   HENT:   Right Ear: External ear normal.   Left Ear: External ear normal.   Nose: Nose normal.   Mouth/Throat: Oropharynx is clear and moist.   Eyes: Conjunctivae and EOM are normal. Pupils are equal, round, and reactive to light. Right eye exhibits no discharge. Left eye exhibits no discharge.   Neck: No " thyromegaly present.   Cardiovascular: Normal rate, regular rhythm and normal heart sounds.    No murmur heard.  Pulmonary/Chest: Effort normal and breath sounds normal.   Abdominal: Soft. Bowel sounds are normal. She exhibits no distension and no mass. There is no tenderness. There is no rebound and no guarding.   Musculoskeletal: Normal range of motion.   No joint swelling or deformity.   Lymphadenopathy:     She has no cervical adenopathy.   Neurological: She is alert. She has normal reflexes.   Skin: Skin is warm and dry. No rash noted.   Psychiatric: She has a normal mood and affect.        Recent Results (from the past 24 hour(s))   Electrocardiogram Perform - Clinic   Result Value Ref Range    SYSTOLIC BLOOD PRESSURE  mmHg    DIASTOLIC BLOOD PRESSURE  mmHg    VENTRICULAR RATE 65 BPM    ATRIAL RATE 65 BPM    P-R INTERVAL 156 ms    QRS DURATION 82 ms    Q-T INTERVAL 422 ms    QTC CALCULATION (BEZET) 438 ms    P Axis 24 degrees    R AXIS -11 degrees    T AXIS 41 degrees    MUSE DIAGNOSIS       Normal sinus rhythm  Inferior infarct (cited on or before 21-DEC-2016)  Abnormal ECG  When compared with ECG of 21-DEC-2016 23:57,  No significant change was found

## 2021-06-10 NOTE — PROGRESS NOTES
ASSESSMENT AND PLAN:  Problem List Items Addressed This Visit     Hypertension, goal below 140/90 (Chronic)     She describes labile hypertension with some low blood pressures as well as high blood pressures.  I will make no changes to her blood pressure medication today as I am fearful that I will cause it to drop too low.         Relevant Orders    Ambulatory referral to Internal Medicine    Small bowel obstruction    Relevant Orders    Ambulatory referral to Internal Medicine    Ambulatory referral to Gastroenterology    Hyponatremia - Primary     She limits her fluid intake, but now is having small bowel obstructions so trying to find a balance between how much she can drink to wash food through and before causing more electrolyte abnormalities.     They wish to see an Internal Medicine Doctor or geriatrician.          Relevant Orders    Ambulatory referral to Internal Medicine    Hypokalemia     Internal med consult.          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         Relevant Orders    Ambulatory referral to Oncology         Chief Complaint   Patient presents with     Review Of Multiple Medical Conditions     Patient brought a list with her today of concerns.      JAZMIN Toro is a 87 y.o. female to follow-up on multiple issues.  She has both cancer and has been seeing Dr. Mckeon of oncology at [facility].  She says the medication that she was prescribed resulted in side effects (high blood pressure).  She felt the oncology nurse was insensitive when she called and despite the side effects she was told to continue taking the medication.  She was also told that Dr. Mckeon want her to continue the medication.  She ended up stopping the medication because she did not feel and her pharmacist also  thought that she should stop the medication.  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  She is hoping to get a new oncologist for a second opinion.    Another concern that she recurrently since December 2016.  She has not had a successful intervention for this and does not want a colonoscopy options are limited.  Would like to see someone at [facility] and likes Dr. Nicolas Nova and hopes to see him again.  She would like referral to gastro.    She does mention that her blood pressure was under control until she started the Anastroyzole.  Last week, she went shopping and had some swelling she also for revision in the bones with little extremity.  She is scared that this could be something serious.  She is not interested in stress test does not want to change blood pressure medication adjustment.  She knows she has had some low sodiums in the past and is not interested in any testing for now, but wanted me to be aware.    History   Smoking Status     Never Smoker   Smokeless Tobacco     Never Used      Current Outpatient Prescriptions   Medication Sig Dispense Refill     aspirin 81 MG EC tablet Take 81 mg by mouth every other day.       atenolol (TENORMIN) 50 MG tablet Take 1 tablet (50 mg total) by mouth bedtime. 30 tablet 11     diltiazem (CARDIZEM CD) 300 MG 24 hr capsule Take 1 capsule (300 mg total) by mouth every morning. 30 capsule 11     multivitamin with minerals (THERA-M) 9 mg iron-400 mcg Tab tablet Take 1 tablet by mouth 3 (three) times a week.       pravastatin (PRAVACHOL) 20 MG tablet Take 1 tablet (20 mg total) by mouth every other day. 30 tablet 11     valsartan (DIOVAN) 80 MG tablet Take 1 tablet (80 mg total) by mouth Daily after lunch. 30 tablet 11     anastrozole (ARIMIDEX) 1 mg tablet Take 1 tablet (1 mg total) by mouth daily. 30 tablet 2     No current facility-administered medications for this visit.      Allergies   Allergen Reactions      Amlodipine Other (See Comments)     PN: swollen tongue, irritated throat     Lisinopril Anaphylaxis     Losartan Swelling     PN: severe leg swelling     Sulfa (Sulfonamide Antibiotics) Swelling     Throat swelling     Sulfacetamide Sodium Anaphylaxis     Thiazides Other (See Comments)     Shakes and chills.     Coffee Other (See Comments)     PN: decaff coffee     Review of Systems   Constitutional: Negative.    HENT: Negative.    Eyes: Negative.    Respiratory: Negative.    Cardiovascular: Negative.    Gastrointestinal: Negative.    Endocrine: Negative.    Genitourinary: Negative.    Musculoskeletal: Negative.    Skin: Negative.    Neurological: Negative.    Hematological: Negative.    Psychiatric/Behavioral: Negative.        OBJECTIVE: /80  Pulse 72  Resp 16  Wt 153 lb (69.4 kg)  Breastfeeding? No  BMI 26.26 kg/m2  Physical Exam   Constitutional: She is oriented to person, place, and time. She appears well-developed and well-nourished.   HENT:   Head: Normocephalic and atraumatic.   Eyes: Conjunctivae are normal.   Cardiovascular: Normal rate, regular rhythm and normal heart sounds.    Pulmonary/Chest: Effort normal and breath sounds normal.   Abdominal: Soft.   Neurological: She is alert and oriented to person, place, and time.   Skin: Skin is warm and dry.   Psychiatric: She has a normal mood and affect.

## 2021-06-10 NOTE — PROGRESS NOTES
I mailed Angelica a list of Internal Medicine Md's at [facility], per her request.

## 2021-06-13 NOTE — PROGRESS NOTES
This is a 88 y.o. woman who comes in for  continued follow-up of her left breast cancer.  She is now 6 months  status post left partial mastectomy without radiation.  She is feeling well.  She has no new complaints today.  She did stop taking her anastrozole because of the side effects.  She has been seen a different oncologist at [facility] who basically gave her the option and she is opted not to take anything further.      Please see the chart review for PMH, Meds, allergies, FH and SH.    ROS:  A 12 point comprehensive review of systems was negative except as noted.      Physical Exam:  /80 (Patient Site: Right Arm, Patient Position: Sitting, Cuff Size: Adult Regular)  Pulse 66  Wt 154 lb (69.9 kg)  SpO2 97%  BMI 26.43 kg/m2  General appearance: alert, appears stated age and cooperative  Breasts: There are no palpable masses. The scar has healed up well.  No evidence of recurrence.  Lymph nodes: Cervical, supraclavicular, and axillary nodes normal.  Neurologic: Grossly normal    Data Review: Reviewed her current mammograms. No evidence of disease.      Impression: Personal History of breast cancer. NOD.  Went over the options with her.  I do feel fairly strongly that Angelica should be on some sort of therapy.  I understand that she had bad side effects with the anastrozole but she is at very high risk for recurrence.  After discussing it at length with her and her daughter she is agreed to at least try tamoxifen.  I told her she has bad side effects and I am not going to force her to keep taking it but I do not think that doing nothing is a very good idea.  If this does recur in the steroids can be a very difficult issue to take care of.  Her daughter agrees with this and encouraged her to at least give the tamoxifen a try.    Plan: Tamoxifen 20 mg daily.  Follow-up with me in 6 months.

## 2021-06-13 NOTE — PROGRESS NOTES
I was able to meet with Angelica and her daughter today to present her SCP. I explained the contents of the plan, treatment summary, survivorship resources, etc. We discussed bone strength, walking/weight bearing exercise as well as calcium/vit d supplements. She denies having any issues since her dx/sx. I asked if she could look over the plan, as I would like to call her in a couple of weeks to get her feedback. I thanked her for her time and congratulated her on her survivorship. Ayde

## 2021-06-25 NOTE — PROGRESS NOTES
Progress Notes by Belinda Jackson MD at 2/8/2017  2:40 PM     Author: Belinda Jackson MD Service: -- Author Type: Physician    Filed: 2/8/2017  3:53 PM Encounter Date: 2/8/2017 Status: Signed    : Belinda Jackson MD (Physician)       Assessment/Plan:      Problem List Items Addressed This Visit     Small bowel obstruction     Resolved, stools are a bit firm but no residual abdominal pain. Discussed hydration and fiber intake. She will rtc if improved hydration and fiber intake are not helpful in softening stools.         Left breast lump     See diagram and exam 2/8/2017. Will get imaging. It is at the border of the breast so not sure it will be visible on mammogram. Will defer to rads in terms of best study to investigate.          Relevant Orders    Mammo Diagnostic Bilateral          MED RECONCILLIATION:  Current Outpatient Prescriptions   Medication Sig Dispense Refill   ? aspirin 81 MG EC tablet Take 81 mg by mouth every other day.     ? atenolol (TENORMIN) 50 MG tablet Take 50 mg by mouth bedtime.     ? diltiazem (CARDIZEM CD) 300 MG 24 hr capsule Take 300 mg by mouth every morning.     ? multivitamin with minerals (THERA-M) 9 mg iron-400 mcg Tab tablet Take 1 tablet by mouth 3 (three) times a week.     ? pravastatin (PRAVACHOL) 20 MG tablet Take 20 mg by mouth every other day.     ? valsartan (DIOVAN) 80 MG tablet Take 80 mg by mouth Daily after lunch.       No current facility-administered medications for this visit.         Subjective:    Patient ID: Angelica Toro is a 87 y.o. female.    HPI Comments: Angelica Toro is a 87 y.o. female comes in for follow up of partial small bowel obstruction and related abdominal pain which she says is better.  She also notes that there has been a lump in the left breast since November 2016.  This is the first time she mentions the lump in clinic. She has a daughter with hx of breast cancer.       History   Smoking Status   ? Never Smoker   Smokeless  Tobacco   ? Never Used      Current Outpatient Prescriptions   Medication Sig Dispense Refill   ? aspirin 81 MG EC tablet Take 81 mg by mouth every other day.     ? atenolol (TENORMIN) 50 MG tablet Take 50 mg by mouth bedtime.     ? diltiazem (CARDIZEM CD) 300 MG 24 hr capsule Take 300 mg by mouth every morning.     ? multivitamin with minerals (THERA-M) 9 mg iron-400 mcg Tab tablet Take 1 tablet by mouth 3 (three) times a week.     ? pravastatin (PRAVACHOL) 20 MG tablet Take 20 mg by mouth every other day.     ? valsartan (DIOVAN) 80 MG tablet Take 80 mg by mouth Daily after lunch.       No current facility-administered medications for this visit.      Allergies   Allergen Reactions   ? Amlodipine Other (See Comments)     PN: swollen tongue, irritated throat   ? Lisinopril Anaphylaxis   ? Losartan Swelling     PN: severe leg swelling   ? Sulfa (Sulfonamide Antibiotics) Swelling     Throat swelling   ? Thiazides Other (See Comments)     Shakes and chills.   ? Coffee Other (See Comments)     PN: decaff coffee         Review of Systems   Constitutional: Negative.    HENT: Negative.    Eyes: Negative.    Respiratory: Negative.    Cardiovascular: Negative.    Gastrointestinal: Negative.    Endocrine: Negative.    Genitourinary: Negative.    Musculoskeletal: Negative.    Skin: Negative.    Neurological: Negative.    Hematological: Negative.    Psychiatric/Behavioral: Negative.              Objective:    Physical Exam   Constitutional: She is oriented to person, place, and time. She appears well-developed and well-nourished.   HENT:   Head: Normocephalic.   Right Ear: External ear normal.   Left Ear: External ear normal.   Mouth/Throat: Oropharynx is clear and moist.   Neck: Normal range of motion.   Cardiovascular: Normal rate and regular rhythm.    Pulmonary/Chest: Effort normal. Right breast exhibits no inverted nipple, no mass, no nipple discharge, no skin change and no tenderness. Left breast exhibits mass (1cm ill  defined fixed firm mass with overlying skin appearing erythematous and like it had been draining in the past, no drainage now.). Left breast exhibits no inverted nipple, no nipple discharge, no skin change and no tenderness. Breasts are symmetrical.       Abdominal: Soft. Normal appearance and bowel sounds are normal. She exhibits no distension and no mass. There is no tenderness. There is no rebound, no guarding and no CVA tenderness.   Neurological: She is alert and oriented to person, place, and time.   Skin: Skin is warm and dry.   Psychiatric: She has a normal mood and affect. Judgment normal.

## 2021-08-05 PROBLEM — E87.1 CHRONIC HYPONATREMIA: Status: ACTIVE | Noted: 2021-01-01

## 2021-08-05 PROBLEM — R68.83 CHILLS: Status: ACTIVE | Noted: 2021-01-01

## 2021-08-05 PROBLEM — N30.00 ACUTE CYSTITIS WITHOUT HEMATURIA: Status: ACTIVE | Noted: 2021-01-01

## 2021-08-05 NOTE — H&P
[facility]    History and Physical - Hospitalist Service       Date of Admission:  8/5/2021    Assessment & Plan      Angelica Toro is a 89 year old female admitted on 8/5/2021. She has a history of bilateral ovarian masses, CAD, HTN, HLD, and chronic hyponatremia presents with fever and chills and admitted for acute cystitis.     Acute Cystitis - Urinary Tract Infection  -Presented with fever and chills, temp max 101.2 here. However, no tachypna or tachycardia or leukocytosis.   -Urinalysis with reflex culture ordered showed +nitrites.   -Continue IV ceftriaxone and follow-up urine culture speciation/sensitivities to tailor antibiotics.   -Delirium precautions.   -If becomes acutely agitated or develops acute delirium, utilize verbal redirection first, and involve patient's family/contacts if available. Pharmacologic as-needed interventions as second-line, and would only judiciously consider 1:1 sitter and/or restraints if patient becomes a physical danger to self and/or to others/staff or if previous interventions unsuccessful or not effective.     Chronic Hyponatremia  -Na was 128 in 3/10/2021, and most recently was 124 on 7/14/2021 a month ago  -Na at admission is also 124 and unchanged from a month ago and baseline  -PCP outpatient follow-up and monitoring only as needed or indicated, clinical status changes    HTN  CAD  -Order home medications.    HLD  -Order home statin.    Dispo  -PT, OT evals  -CM/SW       Diet:   Regular  DVT Prophylaxis: Low Risk/Ambulatory with no VTE prophylaxis indicated  Ferrer Catheter: Not present  Central Lines: None  Code Status:   DNR/DNI - confirmed directly with Angelica    Clinically Significant Risk Factors Present on Admission         # Hyponatremia: Na = 124 mmol/L (Ref range: 136 - 145 mmol/L) on admission, will monitor as appropriate      # Platelet Defect: home medication list includes an antiplatelet medication      Disposition Plan   Expected  discharge: 2 days recommended to TBD once adequate pain management/ tolerating PO medications, antibiotic plan established, safe disposition plan/ TCU bed available and urine culture.     The patient's care was discussed with the Bedside Nurse, Patient and Patient's Family.    Carlos Felipe MD  [facility]  Securely message with the Vocera Web Console (learn more here)  Text page via [facility] Paging/Directory      ______________________________________________________________________    Chief Complaint   Fever, chills    History is obtained from the patient    History of Present Illness   Angelica Toro is a 89 year old female admitted on 8/5/2021. She has a history of bilateral ovarian masses, CAD, HTN, HLD, and chronic hyponatremia presents with fever and chills and admitted for acute cystitis.     She has had intermittent chills for the past few weeks and today presented to [facility] clinic wherein she was found to have a UTI and abnormal labs and instructed to come to an ED and she and her family chose [facility]. Upon arrival she has a fever > 101 F but no other SIRS criteria. UA c/w UTI.  No other associated symptoms. The patient also denies any recent travel domestically or internationally, and also denies any recent sick contacts including any family or friends who have been sick.  When asked, the patient denies any chest pain or shortness of breath, nausea or vomiting or abdominal pain, changes in bowel movements/pattern, focal weakness, or any other new and associated symptoms at this time.  The patient has been compliant with home medications as prescribed. 14 point review of systems performed with the patient without any other pertinent positives at this time. Presently, the patient's symptoms and pain are tolerable at the moment.    The social history, family history, and past medical history were directly reviewed with the patient and corroborated to be accurate as documented below. All  questions the patient had at this time were answered to verbalized and stated satisfaction and understanding. Code status was discussed and the patient confirmed wishes for DNR/DNI for this hospitalization.    Review of Systems    The 10 point Review of Systems is negative other than noted in the HPI or here.     Past Medical History    I have reviewed this patient's medical history and updated it with pertinent information if needed.   Past Medical History:   Diagnosis Date     Breast cancer (H)     left, s/p lumpectomy     CAD (coronary artery disease)     MI 2010 s/p stent     Diastolic dysfunction      Diverticulosis      HLD (hyperlipidemia)      HTN (hypertension)      Hx SBO      Right ovarian cyst      STEMI (ST elevation myocardial infarction) (H) 10/28/2010    inferior     Urinary incontinence        Past Surgical History   I have reviewed this patient's surgical history and updated it with pertinent information if needed.  Past Surgical History:   Procedure Laterality Date     ANGIOPLASTY  2010     ANKLE FRACTURE SURGERY Right 1995     APPENDECTOMY       APPENDECTOMY  1975     AS CORNEAL TRANSPLANT,LAMELLAR       CATARACT EXTRACTION Bilateral 2007     CHOLECYSTECTOMY       CHOLECYSTECTOMY  1975     CORNEAL TRANSPLANT Bilateral 2011     FRACTURE TX, ANKLE RT/LT       LAPAROSCOPIC SALPINGO-OOPHORECTOMY Bilateral 3/26/2021    Procedure: Laparoscopic BSO. Cystoscopy;  Surgeon: Juanojse Camejo MD;  Location: [facility] OR     LIGATE VEIN       LUMPECTOMY BREAST Left      LUMPECTOMY BREAST Left 03/10/2017     [facility] PATIENT HAS A CORONARY ARTERY STENT  2010     US BREAST BIOPSY LT       VEIN SURGERY Right 1980       Social History   I have reviewed this patient's social history and updated it with pertinent information if needed.  Social History     Tobacco Use     Smoking status: Never Smoker     Smokeless tobacco: Never Used   Substance Use Topics     Alcohol use: Yes     Alcohol/week: 1.0 standard drinks      Types: 1 Cans of beer per week     Comment: 3 times a year     Drug use: No       Family History   I have reviewed this patient's family history and updated it with pertinent information if needed.  Family History   Problem Relation Age of Onset     Breast Cancer Daughter 50.00     Breast Cancer Niece      Hypertension Mother      Uterine Cancer Mother      Prostate Cancer Father      Cancer Sister         small intestinal cancer.     No Known Problems Son         passed away in an accident.     No Known Problems Son      Heart Disease Daughter      Hyperlipidemia Daughter      Sjogren's Daughter      Hyperlipidemia Daughter        Prior to Admission Medications   Prior to Admission Medications   Prescriptions Last Dose Informant Patient Reported? Taking?   aspirin 81 MG EC tablet 8/4/2021 at Unknown time  Yes Yes   Sig: Take 81 mg by mouth daily    carvedilol (COREG) 6.25 MG tablet 8/5/2021 at x 1   Yes Yes   Sig: Take 6.25 mg by mouth 2 times daily (with meals)    diltiazem 300 MG 24 hr capsule 8/4/2021 at 1300  Yes Yes   Sig: Take 300 mg by mouth daily Takes at 1300.   valsartan (DIOVAN) 80 MG tablet 8/4/2021 at Unknown time  Yes Yes   Sig: Take 80 mg by mouth every evening       Facility-Administered Medications: None     Allergies   Allergies   Allergen Reactions     Amlodipine Cough, Swelling and Other (See Comments)     PN: swollen tongue, irritated throat     Anastrozole Palpitations     High b/p, headache     Lisinopril Anaphylaxis, Shortness Of Breath and Other (See Comments)     Losartan Swelling     PN: severe leg swelling     Sulfa Drugs Swelling and Anaphylaxis     Throat swelling     Thiazide-Type Diuretics Other (See Comments)     Shakes and chills.  Shakes and chills.     Coffee Bean Extract  [Coffea Arabica]      Other reaction(s): Other (See Comments)  PN: decaff coffee     Furosemide Swelling     Hydrochlorothiazide Swelling     Metolazone Other (See Comments)     PN: Trouble breathing and  tremors     Morphine GI Disturbance     No Clinical Screening - See Comments Other (See Comments)     PN: harini coffee       Physical Exam   Vital Signs: Temp: (!) 101.2  F (38.4  C) Temp src: Oral BP: (!) 157/92 Pulse: 88   Resp: 16 SpO2: 92 % O2 Device: None (Room air)    Weight: 129 lbs 0 oz    GENERAL:  Alert, appears comfortable, in no acute distress, appears stated age   HEAD:  Normocephalic, without obvious abnormality, atraumatic   EYES:  PERRL, conjunctiva/corneas clear, no scleral icterus, EOM's intact   NOSE: Nares normal, septum midline, mucosa normal, no drainage   THROAT: Lips, mucosa, and tongue normal; teeth and gums normal, mouth moist   NECK: Supple, symmetrical, trachea midline   BACK:   Symmetric, no curvature, ROM normal   LUNGS:   Clear to auscultation bilaterally, no rales, rhonchi, or wheezing, symmetric chest rise on inhalation, respirations unlabored   CHEST WALL:  No tenderness or deformity   HEART:  Regular rate and rhythm, S1 and S2 normal, no murmur, rub, or gallop    ABDOMEN:   Soft, non-tender, bowel sounds active all four quadrants, no masses, no organomegaly, no rebound or guarding   EXTREMITIES: Extremities normal, atraumatic, no cyanosis or edema    SKIN: Dry to touch, no exanthems in the visualized areas   NEURO: Alert, oriented x 4, moves all four extremities freely, non-focal   PSYCH: Cooperative, behavior is appropriate      Data   Data reviewed today: I reviewed all medications, new labs and imaging results over the last 24 hours. I personally reviewed the EKG tracing showing NSR.    Recent Labs   Lab 08/05/21  1515   WBC 9.8   HGB 10.6*   MCV 77*   *   *   POTASSIUM 3.8   CHLORIDE 91*   CO2 25   BUN 12   CR 0.59*   ANIONGAP 8   BAIGAIL 8.6      ALBUMIN 2.4*   PROTTOTAL 6.9   BILITOTAL 0.6   ALKPHOS 126*   ALT 26   AST 22     No results found for this or any previous visit (from the past 24 hour(s)).

## 2021-08-05 NOTE — ED TRIAGE NOTES
Patient presents to the ED after having chills, weakness and a fever. She states she has been sick since the 14th but things have gotten progressively worse since the weekend. No dizziness, nausea, diarrhea.

## 2021-08-05 NOTE — ED PROVIDER NOTES
Expected Patient Referral to ED  12:40 PM    Referring Clinic/Provider:  [facility]    Reason for referral/Clinical facts:  Increasing weakness over the last few days.  UTI suggest infection this morning.  Chronic hyponatremia.  Patient in route for potential admission.    Recommendations provided:  See and treat      Informed caller that recommendations provided are recommendations based only on the facts provided and that they responsible to accept or reject the advice, or to seek a formal in person consultation as needed and that this ED will see/treat patient should they arrive.      Jake Batista MD  Emergency Medicine  [facility] EMERGENCY ROOM  [address] [place] [address] [id number] [phone]     Jake Batista MD  08/05/21 1241

## 2021-08-05 NOTE — PHARMACY-ADMISSION MEDICATION HISTORY
Pharmacy Note - Admission Medication History    Pertinent Provider Information: None. ______________________________________________________________________    Prior To Admission (PTA) med list completed and updated in EMR.       PTA Med List   Medication Sig Last Dose     aspirin 81 MG EC tablet Take 81 mg by mouth daily  8/4/2021 at Unknown time     carvedilol (COREG) 6.25 MG tablet Take 6.25 mg by mouth 2 times daily (with meals)  8/5/2021 at x 1      diltiazem 300 MG 24 hr capsule Take 300 mg by mouth daily Takes at 1300. 8/4/2021 at 1300     valsartan (DIOVAN) 80 MG tablet Take 80 mg by mouth every evening  8/4/2021 at Unknown time     Information source(s): Patient, Family member, Clinic records and [facility]  Method of interview communication: in-person    Summary of Changes to PTA Med List  New: None  Discontinued: triamcinolone cream, ferrous gluconate, multivitamin, pravastatin  Changed: None    Patient was asked about OTC/herbal products specifically.  PTA med list reflects this.    In the past week, patient estimated taking medication this percent of the time:  greater than 90%.    Patient does not use any multi-dose medications prior to admission.    The information provided in this note is only as accurate as the sources available at the time of the update(s).    Thank you for the opportunity to participate in the care of this patient.    Cheryl Hu RPH  8/5/2021 6:01 PM

## 2021-08-06 PROBLEM — R10.84 ABDOMINAL PAIN, GENERALIZED: Status: ACTIVE | Noted: 2021-01-01

## 2021-08-06 PROBLEM — I48.91 ATRIAL FIBRILLATION WITH RAPID VENTRICULAR RESPONSE (H): Status: ACTIVE | Noted: 2021-01-01

## 2021-08-06 NOTE — PROGRESS NOTES
"Pt was sitting on toilet having a bowel movement and became diaphoretic and weak. Blood pressure was 60/50 with a heart rate of 160 BPM. Pt orientated to self and situation, stated, \"I just don't feel good.\" Rapid response was called. Pt was moved to wheelchair and wheeled to bed, staff assisted pt from wheelchair to bed.   Milo Borja RN   "

## 2021-08-06 NOTE — PROVIDER NOTIFICATION
Dr Felipe notified of critical K of 2.6. New orders for additional potassium replacement above protocol.     Also notified of HR continuing to maintain 130's despite home medications given. Orders to give PRN metoprolol.     Cardiology to order heparin and amiodarone drips. Dr Felipe acknowledged.

## 2021-08-06 NOTE — CODE DOCUMENTATION
Pt had afib with RVR.  Has been more responsive and alert, follows commands.  Receiving a fluid bolus per md.

## 2021-08-06 NOTE — PROGRESS NOTES
[facility]    Medicine Progress Note - Hospitalist Service       Date of Admission:  8/5/2021    Assessment & Plan              Angelica Toro is a 89 year old female admitted on 8/5/2021. She has a history of bilateral ovarian masses, CAD, HTN, HLD, and chronic hyponatremia presents with fever and chills and admitted for acute cystitis. Her course was complicated by new-onset atrial fibrillation with RVR for which cardiology was consulted and amiodarone and heparin infusions started. Acute onset of ongoing abdominal pain, without rigidity or signs of peritonitis, and abnormal plain film abdominal imaging with wall swelling/inflammation, so will further evaluate with CT abdominal scan and also broaden antibiotics to zosyn as she meets sepsis criteria overnight with her rapid response.     Atrial Fibrillation with Rapid Ventricular Response  -Telemetry.  -Optimize potassium and magnesium. Keep K > 4.0, Mag > 2.0.   -RN potassium and magnesium replacement protocols utilized/ordered.   -Order home medications including AV-rick rate control medication. Home diltiazem already ordered. IV metoprolol ordered additionally as-needed to maintain goal HR < 110 bpm.  -Consult to cardiology appreciated   -Consider anticoagulation with warfarin/DOAC if c/w patient's goals of care.  -If any acute or further sustained rapid rates develop, obtain EKG to evaluate for RVR and initiate cardiac telemetry and update [facility].   -Update: amiodarone and heparin gtt's ordered.   -Order and check TSH    Hypokalemia  -K  2.6  -On K and mag protocols  -Additional K ordered to achieve goal of > 4.0 K and > 2.0 mag    Acute abdominal pain - without rigidity or peritonitis   Acute Cystitis - Urinary Tract Infection  -Presented with fever and chills, temp max 101.2 here. However, no tachypna or tachycardia or leukocytosis.   -Urinalysis with reflex culture ordered showed +nitrites.   -Delirium precautions.   -If becomes  acutely agitated or develops acute delirium, utilize verbal redirection first, and involve patient's family/contacts if available. Pharmacologic as-needed interventions as second-line, and would only judiciously consider 1:1 sitter and/or restraints if patient becomes a physical danger to self and/or to others/staff or if previous interventions unsuccessful or not effective.   -Abnormal plain film scan  -Follow-up lipase  -CT abdominal scan, STAT  -Broaden to and ordered Zosyn    Chronic Hyponatremia  -Na was 128 in 3/10/2021, and most recently was 124 on 7/14/2021 a month ago  -Na at admission is also 124 and unchanged from a month ago and baseline  -PCP outpatient follow-up and monitoring only as needed or indicated, clinical status changes.  -Na noted 127 today.     HTN  CAD  -Order home medications.    HLD  -Order home statin.    Dispo  -PT, OT evals  -CM/SW         Diet: Combination Diet Regular Diet Adult    DVT Prophylaxis: heparin gtt  Ferrer Catheter: Not present  Central Lines: None  Code Status: No CPR- Do NOT Intubate      Disposition Plan   Expected discharge: 08/07/2021 recommended to TBD once adequate pain management/ tolerating PO medications, antibiotic plan established, safe disposition plan/ TCU bed available and SIRS/Sepsis treated.     The patient's care was discussed with the Care Coordinator/ and Patient.    Carlos Felipe MD  Hospitalist Service  [facility]  Securely message with the Vocera Web Console (learn more here)  Text page via [facility] Paging/Directory      Clinically Significant Risk Factors Present on Admission         # Hyponatremia: Na = 124 mmol/L (Ref range: 136 - 145 mmol/L) on admission, will monitor as appropriate      # Platelet Defect: home medication list includes an antiplatelet medication      ______________________________________________________________________    Interval History   Rapid response x2 overnight and this morning during  cross-cover.     No hypotension. Rates in 130-140s, improved to 100-110's w/ metoprolol and diltiazem. No chest pain, no SOB. New onset of abdominal pain, no rebound or rigidity, reduced bowel sounds. She has history of left ovary surgery and diverticulitis.     Data reviewed today: I reviewed all medications, new labs and imaging results over the last 24 hours. I personally reviewed the EKG tracing showing afib with RVR.    Physical Exam   Vital Signs: Temp: 98.7  F (37.1  C) Temp src: Oral BP: 103/64 Pulse: (!) 133   Resp: 18 SpO2: 96 % O2 Device: None (Room air)    Weight: 127 lbs 1 oz  GENERAL:  Alert, appears comfortable, in no acute distress, appears stated age   HEAD:  Normocephalic, without obvious abnormality, atraumatic   EYES:  PERRL, conjunctiva/corneas clear, no scleral icterus, EOM's intact   NOSE: Nares normal, septum midline, mucosa normal, no drainage   THROAT: Lips, mucosa, and tongue normal; teeth and gums normal, mouth moist   NECK: Supple, symmetrical, trachea midline   BACK:   Symmetric, no curvature, ROM normal   LUNGS:   Clear to auscultation bilaterally, no rales, rhonchi, or wheezing, symmetric chest rise on inhalation, respirations unlabored   CHEST WALL:  No tenderness or deformity   HEART:  Regular rate and rhythm, S1 and S2 normal, no murmur, rub, or gallop    ABDOMEN:   Soft, moderately tender diffusely and throughout, no rigidity, no rebound, no involuntary guarding, bowel sounds hypoactive all four quadrants, no masses, no organomegaly, no rebound or guarding   EXTREMITIES: Extremities normal, atraumatic, no cyanosis or edema    SKIN: Dry to touch, no exanthems in the visualized areas   NEURO: Alert, oriented x 4, moves all four extremities freely, non-focal   PSYCH: Cooperative, behavior is appropriate      Data   Recent Labs   Lab 08/06/21  0702 08/06/21  0649 08/05/21  1515   WBC  --   --  9.8   HGB  --   --  10.6*   MCV  --   --  77*   PLT  --   --  503*   *  --  124*    POTASSIUM 2.6*  --  3.8   CHLORIDE 95*  --  91*   CO2 20*  --  25   BUN 10  --  12   CR 0.57*  --  0.59*   ANIONGAP 12  --  8   ABIGAIL 8.0*  --  8.6    130* 110   ALBUMIN  --   --  2.4*   PROTTOTAL  --   --  6.9   BILITOTAL  --   --  0.6   ALKPHOS  --   --  126*   ALT  --   --  26   AST  --   --  22   LIPASE 24  --   --      Recent Results (from the past 24 hour(s))   XR Abdomen 1 View    Narrative    EXAM: XR ABDOMEN 1 VIEW  LOCATION: [facility]  DATE/TIME: 8/6/2021 7:05 AM    INDICATION: abdominal pain  COMPARISON: CT enterography 07/02/2019      Impression    IMPRESSION:     Decompressed stomach. 2 adjacent loops of gas containing small bowel in the left lower quadrant are mildly gas distended and may have mild wall thickening. Other small bowel loops in the central pelvis/right lower quadrant are normal caliber. Normal   colonic gas pattern.  Nothing for obstruction or free air.      Atheromatous calcifications in the left related to splenic artery. No nephrolithiasis.     Medications     sodium chloride 75 mL/hr at 08/06/21 0938       acetaminophen  650 mg Oral Once     aspirin  81 mg Oral Daily     carvedilol  6.25 mg Oral BID w/meals     cefTRIAXone  1 g Intravenous Q24H     diltiazem ER COATED BEADS  180 mg Oral Daily    And     diltiazem ER COATED BEADS  120 mg Oral Daily     sodium chloride (PF)  3 mL Intracatheter Q8H     valsartan  80 mg Oral QPM

## 2021-08-06 NOTE — PROGRESS NOTES
In for follow-up of her left lumpectomy  without sentinel lymph node biopsy.  She is feeling well.  She is having very minimal pain.      Physical exam:  Appears well.  Does not appear in any discomfort.  Breasts: Incision is healing nicely with no signs of infection.  No swelling.    Pathology: The tumor was 1.4 cm.  The margins are negative but close.  There was focal involvement of the chest wall musculature.    Impression: Postop visit. Doing well.  I discussed with her the options of further therapy.  Generally at her age we would not recommend radiation.  The only thing that gives me pause is the fact that was focally involving the chest wall musculature.  It would be hard area to get good radiation however because it is right over her heart.  I would tend to favor just doing hormone therapy but would be curious what the oncologist think.  I definitely recommend hormone therapy.    Plan:   We'll refer her onto [facility] oncology.  Follow-up with me in 6 months.  I am not getting get mammograms because this was not visible on a mammogram anyway and I think she will be better followed by close physical exam.

## 2021-08-06 NOTE — PLAN OF CARE
PRIMARY DIAGNOSIS: UTI  OUTPATIENT/OBSERVATION GOALS TO BE MET BEFORE DISCHARGE:  1. ADLs back to baseline: Yes    2. Activity and level of assistance: Up with standby assistance.    3. Pain status: Pain free.    4. Return to near baseline physical activity: Yes     Discharge Planner Nurse   Safe discharge environment identified: Yes  Barriers to discharge: Yes       Entered by: Milo Borja 08/06/2021 2:24 AM     Pt was hypertensive, now resolved. Ambulates SBA to bathroom, denies pain, nausea, or vomiting at this time. Alert and oriented x4, call light within reach, will continue to monitor.     Milo Borja RN

## 2021-08-06 NOTE — PLAN OF CARE
Pt was transferred to the unit at around 1400 and was settled in her room.  She went down to get her CT done.    Heparin drip verified upon arriving to unit.  Amio bolus started    Problem: Adult Inpatient Plan of Care  Goal: Plan of Care Review  Outcome: No Change   Continue to monitor VS, labs, pain level and activity tolerance.      Problem: UTI (Urinary Tract Infection)  Goal: Improved Infection Symptoms  Outcome: Improving   Antibiotics given for UTI.

## 2021-08-06 NOTE — ED NOTES
Pt having fevers and chills along with increased weakness over the weekend. Denies any nausea or emesis at this time. Denies any pain or diarrhea.

## 2021-08-06 NOTE — SIGNIFICANT EVENT
Overhead page rapid response  I am told when I arrived in the room from nursing staff that the patient had a bowel movement had a vasovagal episode. Immediately afterwards blood pressure was 60/50 and the patient was tachycardic on pulse ox monitor in the 150s.    Began resuscitating with IV fluids, obtain EKG. Demonstrated patient was in A. fib with RVR heart rate around 150. Compared to old EKG from March which showed sinus rhythm. Patient is unsure if she has a history of A. fib but does know she has had a stent placed for coronary artery disease.    Administered 5 mg metoprolol (at that time blood pressure 120/70) clinically patient symptom improved. Heart rate remained in the 140s 5 minutes after administration. After little while longer monitoring administered second dose 5 mg IV metoprolol. After this dose heart rate was in the 120s, patient symptomatically improved further. Blood pressure at that time 112/70s.    Abdominal area tender on exam, stat portable x-ray obtained. [place] labs obtained. Signing out to ateja morton.

## 2021-08-06 NOTE — PLAN OF CARE
PRIMARY DIAGNOSIS: UTI  OUTPATIENT/OBSERVATION GOALS TO BE MET BEFORE DISCHARGE:  1. ADLs back to baseline: Yes    2. Activity and level of assistance: Up with standby assistance.    3. Pain status: Pain free.    4. Return to near baseline physical activity: Yes     Discharge Planner Nurse   Safe discharge environment identified: Yes  Barriers to discharge: Yes       Entered by: Milo Borja 08/06/2021 2:21 AM     Had a fever of 101.8 and reported 5/10 lower abdominal pain. PRN Tylenol given with relief, fever currently resolved. Denies nausea, or vomiting. Call light within reach, calls in appropriately, will continue to monitor.     Milo Borja RN

## 2021-08-06 NOTE — CONSULTS
Impression and Plan     Assessment:  1. Atrial fibrillation with RVR - new diagnosis. Not previously on anticoagulation. Rate currently uncontrolled. AM meds given <30 minutes ago.  2. Acute cystitis - treatment per primary  3. Abdominal pain and tenderness with bowel air noted on abdominal x-ray - ?possible early ileus?  4. Chronic hyponatremia  5. Hypertension - BP running low, likely from tachycardia  6. Coronary artery disease - stable without angina.    Plan:    IV heparin infusion. Plan to transition to PO eliquis prior to discharge    Amiodarone bolus and infusion in attempt to convert back to NSR    If rate does not improve by this afternoon would transition carvedilol to metoprolol    Echocardiogram once rate is better controlled.    Will follow.    Primary Cardiologist: not established.    History of Present Illness      Ms. Angelica Toro is a 89 year old female with a history of coronary artery disease and prior inferior/inferolateral MI, hypertension, hyperlipidemia, and chronic hyponatremia was admitted with acute cystitis.     MS. Toro was admitted yesterday after presenting with fever, chills and acute delirium. She was found to have acute cystitis and started on antibiotics. Overnight last night she had a bowel movement and a vasovagal episode with hypotension (BP around 60/50 mmHg) and was noted to be tachycardic on pulse oximetry. An ECG was performed and demonstrated afib with RVR. Her blood pressure recovered and she was administered 5 mg IV metoprolol. A second dose of IV metoprolol was given and her HR decreased to 120s. Angelica reported improved symptoms. She is chronically on carvedilol 6.25 mg BID and diltiazem 300 mg daily, which are continued as inpatient.    Currently, Ms. Toro reports no symptoms of shortness of  Breath,  Chest discomfort, or palpitations. Her only complaint is abdominal pain.    Ms. Toro lives independently and reports no falls. No history of bleeding. She  says she is steady on her feet.    Other than noted above, Ms. Toro denies any chest pain/pressure/tightness, shortness of breath at rest or with exertion, light headedness/dizziness, pre-syncope, syncope, lower extremity swelling, palpitations, paroxysmal nocturnal dyspnea (PND), or orthopnea.    Review of Systems:  Further review of systems is otherwise negative/noncontributory (based on review of medical record (admission H&P) and 13 point review of systems reviewed. Pertinent positives noted).    Cardiac Diagnostics     ECG: Personally reviewed and interpreted: atrial fibrillation with RVR,  bpm.      Telemetry (personally reviewed): atrial fibrillation with RVR    Most recent:    From Note by Jackelin Wilson NP on 6/1/16  Nuclear Stress Test 1/2015  Impressions  Abnormal study after pharmacologic vasodilation. There was a  moderate-sized inferior and inferolateral infarct. There is  no evidence of stress induced ischemia. Overall left  ventricular systolic function was preserved, but there were  regional wall motion abnormalities. Based on this study,  the annual cardiovascular mortality rate is intermediate (1  to 3%) given the moderate sized fixed perfusion defect with  preserved left ventricular systolic function.  Previous study comparison  This study was compared to the previous study of 09/12/2012.  There has been no interval change in myocardial perfusion.  There has been no significant change in left ventricular  systolic function.    ECHOCARDIOGRAM.  Date: 06/05/2014 Start: 12:49 PM [facility]     CONCLUSIONS  Analysis of regional left ventricular function reveals: inferior and  inferolateral hypokinesis to akinesis is present.  Left ventricular ejection fraction is visually estimated at 50%.  Mild to moderate mitral regurgitation.     Compared to the prior study from 10/28/2010, there is no major change.          Medical History  Surgical History Family History Social  History   Past Medical History:   Diagnosis Date     Breast cancer (H)     left, s/p lumpectomy     CAD (coronary artery disease)     MI 2010 s/p stent     Diastolic dysfunction      Diverticulosis      HLD (hyperlipidemia)      HTN (hypertension)      Hx SBO      Right ovarian cyst      STEMI (ST elevation myocardial infarction) (H) 10/28/2010    inferior     Urinary incontinence      Past Surgical History:   Procedure Laterality Date     ANGIOPLASTY  2010     ANKLE FRACTURE SURGERY Right 1995     APPENDECTOMY       APPENDECTOMY  1975     AS CORNEAL TRANSPLANT,LAMELLAR       CATARACT EXTRACTION Bilateral 2007     CHOLECYSTECTOMY       CHOLECYSTECTOMY  1975     CORNEAL TRANSPLANT Bilateral 2011     FRACTURE TX, ANKLE RT/LT       LAPAROSCOPIC SALPINGO-OOPHORECTOMY Bilateral 3/26/2021    Procedure: Laparoscopic BSO. Cystoscopy;  Surgeon: Juanjose Camejo MD;  Location: [facility] OR     LIGATE VEIN       LUMPECTOMY BREAST Left      LUMPECTOMY BREAST Left 03/10/2017     MD PATIENT HAS A CORONARY ARTERY STENT  2010     US BREAST BIOPSY LT       VEIN SURGERY Right 1980     Family History   Problem Relation Age of Onset     Breast Cancer Daughter 50.00     Breast Cancer Niece      Hypertension Mother      Uterine Cancer Mother      Prostate Cancer Father      Cancer Sister         small intestinal cancer.     No Known Problems Son         passed away in an accident.     No Known Problems Son      Heart Disease Daughter      Hyperlipidemia Daughter      Sjogren's Daughter      Hyperlipidemia Daughter            Social History     Socioeconomic History     Marital status: Single     Spouse name: Not on file     Number of children: Not on file     Years of education: Not on file     Highest education level: Not on file   Occupational History     Not on file   Tobacco Use     Smoking status: Never Smoker     Smokeless tobacco: Never Used   Substance and Sexual Activity     Alcohol use: Yes     Alcohol/week: 1.0 standard  "drinks     Types: 1 Cans of beer per week     Comment: 3 times a year     Drug use: No     Sexual activity: Not Currently   Other Topics Concern     Not on file   Social History Narrative     Not on file     Social Determinants of Health     Financial Resource Strain:      Difficulty of Paying Living Expenses:    Food Insecurity:      Worried About Running Out of Food in the Last Year:      Ran Out of Food in the Last Year:    Transportation Needs:      Lack of Transportation (Medical):      Lack of Transportation (Non-Medical):    Physical Activity:      Days of Exercise per Week:      Minutes of Exercise per Session:    Stress:      Feeling of Stress :    Social Connections:      Frequency of Communication with Friends and Family:      Frequency of Social Gatherings with Friends and Family:      Attends Sabianist Services:      Active Member of Clubs or Organizations:      Attends Club or Organization Meetings:      Marital Status:    Intimate Partner Violence:      Fear of Current or Ex-Partner:      Emotionally Abused:      Physically Abused:      Sexually Abused:              Physical Examination   VITALS: /64 (BP Location: Right arm)   Pulse (!) 133   Temp 98.7  F (37.1  C) (Oral)   Resp 18   Ht 1.575 m (5' 2\")   Wt 57.6 kg (127 lb 1 oz)   SpO2 96%   BMI 23.24 kg/m    BMI: Body mass index is 23.24 kg/m .  Wt Readings from Last 3 Encounters:   08/05/21 57.6 kg (127 lb 1 oz)   04/12/21 60.8 kg (134 lb)   03/26/21 61.9 kg (136 lb 7.4 oz)       Intake/Output Summary (Last 24 hours) at 8/6/2021 0949  Last data filed at 8/6/2021 0700  Gross per 24 hour   Intake 1080 ml   Output 900 ml   Net 180 ml       General Appearance:  Alert, cooperative, no distress, appears stated age   Head:  Normocephalic, without obvious abnormality, atraumatic   Eyes:  PERRL, conjunctiva/corneas clear, EOM's intact   Ears:  Normal external ear canals bilaterally   Nose: Nares normal, septum midline, no drainage   Throat: " Lips, mucosa, and tongue normal   Neck: Supple, no carotid bruit or JVD   Back:   Symmetric, no abnormal curvature, ROM normal   Lungs:   Clear to auscultation bilaterally, respirations unlabored   Chest Wall:  No tenderness or deformity   Heart:  Tachycardic rate, irregularly irregular rhythm, S1, S2 normal,no murmur, rub or gallop   Abdomen:   Soft, Tender to mild palpation in lower abdomen   Extremities: Extremities normal, atraumatic, no cyanosis or edema   Skin: Skin color, texture, turgor normal, no rashes or lesions   Psychiatric: Normal affect, pleasant and cooperative    Neurologic: Alert and oriented X 3, Moves all 4 extremities            Imaging      Xray abdomen - IMPRESSION:     Decompressed stomach. 2 adjacent loops of gas containing small bowel in the left lower quadrant are mildly gas distended and may have mild wall thickening. Other small bowel loops in the central pelvis/right lower quadrant are normal caliber. Normal   colonic gas pattern.  Nothing for obstruction or free air.       Atheromatous calcifications in the left related to splenic artery. No nephrolithiasis       Lab Results    Chemistry/lipid CBC Cardiac Enzymes/BNP/TSH/INR   No results for input(s): CHOL, HDL, LDL, TRIG, CHOLHDLRATIO in the last 45496 hours.  No results for input(s): LDL in the last 42560 hours.  Recent Labs   Lab Test 08/06/21  0649 08/05/21  1515   NA  --  124*   POTASSIUM  --  3.8   CHLORIDE  --  91*   CO2  --  25   * 110   BUN  --  12   CR  --  0.59*   GFRESTIMATED  --  80   ABIGAIL  --  8.6     Recent Labs   Lab Test 08/05/21  1515   CR 0.59*     No results for input(s): A1C in the last 42516 hours.       Recent Labs   Lab Test 08/05/21  1515   WBC 9.8   HGB 10.6*   HCT 32.5*   MCV 77*   *     Recent Labs   Lab Test 08/05/21  1515   HGB 10.6*    No results for input(s): TROPONINI in the last 00580 hours.  No results for input(s): BNP, NTBNPI, NTBNP in the last 01088 hours.  No results for input(s): TSH  in the last 27535 hours.  No results for input(s): INR in the last 41788 hours.        Current Inpatient Scheduled Medications   Scheduled Meds:    acetaminophen  650 mg Oral Once     aspirin  81 mg Oral Daily     carvedilol  6.25 mg Oral BID w/meals     cefTRIAXone  1 g Intravenous Q24H     diltiazem ER COATED BEADS  180 mg Oral Daily    And     diltiazem ER COATED BEADS  120 mg Oral Daily     sodium chloride (PF)  3 mL Intracatheter Q8H     valsartan  80 mg Oral QPM     Continuous Infusions:    sodium chloride 75 mL/hr at 08/06/21 0938       No current outpatient medications on file.          Medications Prior to Admission   Prior to Admission medications    Medication Sig Start Date End Date Taking? Authorizing Provider   aspirin 81 MG EC tablet Take 81 mg by mouth daily  1/8/15  Yes Reported, Patient   carvedilol (COREG) 6.25 MG tablet Take 6.25 mg by mouth 2 times daily (with meals)  12/14/20  Yes Reported, Patient   diltiazem 300 MG 24 hr capsule Take 300 mg by mouth daily Takes at 1300. 3/13/18  Yes Reported, Patient   valsartan (DIOVAN) 80 MG tablet Take 80 mg by mouth every evening    Yes Reported, Patient

## 2021-08-06 NOTE — PLAN OF CARE
Problem: Dysrhythmia  Goal: Normalized Cardiac Rhythm  Intervention: Monitor and Manage Cardiac Rhythm Effect  Recent Flowsheet Documentation  Taken 8/6/2021 5345 by Natasha Joiner RN  VTE Prevention/Management: dorsiflexion/plantar flexion performed     Pt went into A. Fib with RVR this am when in the bathroom..  Given IV metoprolol 5mg x 2 during the rapid at 0700.  HR  remained in the 120's-130's.  Pt is asymptomatic.  C/O abdominal pain.  No appetite.  Given a 3rd dose of IV metoprolol 5mg IV at 1050.  HR has improved since then, less than 100bpm.   Pt is sleepy and just doesn't feel well.    St. Cath x 1 for urine retention.  Only 300ml out.  This did not improve her abd pain.  Abd CT is ordered.     BP is low in the 90's.  HR dropped momentarily to the 50's when the pt was on the BSC.  Back in 80's now.  Pt is being transferred to cardiac unit for closer monitoring and amiodarone drip and heparin drip.  Report called to RAGHAV Perez

## 2021-08-06 NOTE — PROGRESS NOTES
Care Management Initial Consult    General Information  Assessment completed with: Patient,    Type of CM/SW Visit: Initial Assessment    Primary Care Provider verified and updated as needed: Yes   Readmission within the last 30 days: no previous admission in last 30 days         Advance Care Planning:            Communication Assessment  Patient's communication style: spoken language (English or Bilingual)    Hearing Difficulty or Deaf: no   Wear Glasses or Blind: yes    Cognitive  Cognitive/Neuro/Behavioral: WDL                      Living Environment:   People in home: alone     Current living Arrangements: apartment      Able to return to prior arrangements: yes       Family/Social Support:  Care provided by: self  Provides care for: no one  Marital Status:   Children          Description of Support System: Supportive, Involved         Current Resources:   Patient receiving home care services: No     Community Resources: None  Equipment currently used at home: none  Supplies currently used at home: None    Employment/Financial:  Employment Status: retired        Financial Concerns: No concerns identified   Referral to Financial Counselor: No       Lifestyle & Psychosocial Needs:  Social Determinants of Health     Tobacco Use: Low Risk      Smoking Tobacco Use: Never Smoker     Smokeless Tobacco Use: Never Used   Alcohol Use:      Frequency of Alcohol Consumption:      Average Number of Drinks:      Frequency of Binge Drinking:    Financial Resource Strain:      Difficulty of Paying Living Expenses:    Food Insecurity:      Worried About Running Out of Food in the Last Year:      Ran Out of Food in the Last Year:    Transportation Needs:      Lack of Transportation (Medical):      Lack of Transportation (Non-Medical):    Physical Activity:      Days of Exercise per Week:      Minutes of Exercise per Session:    Stress:      Feeling of Stress :    Social Connections:      Frequency of Communication with  Friends and Family:      Frequency of Social Gatherings with Friends and Family:      Attends Tenriism Services:      Active Member of Clubs or Organizations:      Attends Club or Organization Meetings:      Marital Status:    Intimate Partner Violence:      Fear of Current or Ex-Partner:      Emotionally Abused:      Physically Abused:      Sexually Abused:    Depression:      PHQ-2 Score:    Housing Stability:      Unable to Pay for Housing in the Last Year:      Number of Places Lived in the Last Year:      Unstable Housing in the Last Year:        Functional Status:  Prior to admission patient needed assistance: NA             Mental Health Status:  Mental Health Status: No Current Concerns       Chemical Dependency Status:  Chemical Dependency Status: No Current Concerns             Values/Beliefs:  Spiritual, Cultural Beliefs, Tenriism Practices, Values that affect care: no               Additional Information:    SWCM introduced self and CM services to Pt.  Pt lives in private residence alone.  Pt is independent at baseline with all ADLs.  Pt states she has 2 daughter living in [place] who are able to assist her as needed.  CM to follow recommendations. Daughter to transport.     YADI Echeverria

## 2021-08-06 NOTE — CONSULTS
[facility] Surgery Consultation    Angelica Toro MRN# [id number]   Age: 89 year old YOB: 1929     Date of Admission:  8/5/2021    Reason for consult: Abdominal pain  Pelvic abscess       Requesting physician: Dr. Felipe       Level of consult: Consult, follow and place orders           Assessment and Plan:   Assessment:   Abdominal pain  Abdominal abscess presumably from diverticulitis  Atrial fibrillation with rapid ventricular response on heparin drip      Plan:   Abdominal CT with drain placement discussed with radiology already  IV antibiotics  Cardiac care per medicine             Chief Complaint:   Abdominal pain     History is obtained from the patient and family         History of Present Illness:   Is a 89-year-old woman who has had abdominal pain which I can look from the record probably off-and-on since she had her surgery for back in me.  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  Recently some looser stools abdominal pain comes in to evaluate today secondary to cholecystitis work-up went to A. fib and RVR has been put on a heparin drip.  I been asked to see her after getting a CT scan of her abdomen shows that she has an abscess in her pelvis.          Past Medical History:   I have reviewed this patient's past medical history          Past Surgical History:     I have reviewed this patient's past surgical history  Past Surgical History:   Procedure Laterality Date     ANGIOPLASTY  2010     ANKLE FRACTURE SURGERY Right 1995     APPENDECTOMY       APPENDECTOMY  1975     AS CORNEAL TRANSPLANT,LAMELLAR       CATARACT EXTRACTION Bilateral 2007     CHOLECYSTECTOMY       CHOLECYSTECTOMY  1975     CORNEAL TRANSPLANT Bilateral 2011     FRACTURE TX, ANKLE RT/LT       LAPAROSCOPIC SALPINGO-OOPHORECTOMY  Bilateral 3/26/2021    Procedure: Laparoscopic BSO. Cystoscopy;  Surgeon: Juanjose Camejo MD;  Location: [facility] OR     LIGATE VEIN       LUMPECTOMY BREAST Left      LUMPECTOMY BREAST Left 03/10/2017     ME PATIENT HAS A CORONARY ARTERY STENT  2010     US BREAST BIOPSY LT       VEIN SURGERY Right 1980             Social History:   I have reviewed this patient's social history  I have reviewed this patient's social history and commented on significant items within the HPI          Family History:     Family History   Problem Relation Age of Onset     Breast Cancer Daughter 50.00     Breast Cancer Niece      Hypertension Mother      Uterine Cancer Mother      Prostate Cancer Father      Cancer Sister         small intestinal cancer.     No Known Problems Son         passed away in an accident.     No Known Problems Son      Heart Disease Daughter      Hyperlipidemia Daughter      Sjogren's Daughter      Hyperlipidemia Daughter      Family history reviewed          Immunizations:   Immunizations are up to date          Allergies:   All allergies reviewed and addressed          Medications:     Current Facility-Administered Medications   Medication     0.9% sodium chloride BOLUS     acetaminophen (TYLENOL) tablet 650 mg     acetaminophen (TYLENOL) tablet 650 mg     amiodarone (NEXTERONE) 900 mg in sodium chloride 0.9 % 500 mL infusion     amiodarone (NEXTERONE) 900 mg in sodium chloride 0.9 % 500 mL infusion     aspirin EC tablet 81 mg     carvedilol (COREG) tablet 6.25 mg     diltiazem ER COATED BEADS (CARDIZEM CD/CARTIA XT) 24 hr capsule 180 mg    And     diltiazem ER COATED BEADS (CARDIZEM CD/CARTIA XT) 24 hr capsule 120 mg     [Held by provider] heparin 25,000 units in 0.45% NaCl 250 mL ANTICOAGULANT infusion     lidocaine (LMX4) cream     lidocaine 1 % 0.1-1 mL     magnesium hydroxide (MILK OF MAGNESIA) suspension 30 mL     melatonin tablet 1 mg     metoprolol (LOPRESSOR) injection 5 mg     ondansetron  (ZOFRAN-ODT) ODT tab 4 mg    Or     ondansetron (ZOFRAN) injection 4 mg     piperacillin-tazobactam (ZOSYN) 3.375 g vial to attach to  mL bag     polyethylene glycol (MIRALAX) Packet 17 g     sodium chloride (PF) 0.9% PF flush 3 mL     sodium chloride (PF) 0.9% PF flush 3 mL     valsartan (DIOVAN) tablet 80 mg             Review of Systems:   CONSTITUTIONAL: NEGATIVE for fever, chills, change in weight  ENT/MOUTH: NEGATIVE for ear, mouth and throat problems  RESP: NEGATIVE for significant cough or SOB  GI: NEGATIVE for nausea, abdominal pain, heartburn, or change in bowel habits and POSITIVE for abdominal pain suprapubic and diarrhea          Physical Exam:   Vitals were reviewed  Temp: 97  F (36.1  C) Temp src: Oral BP: 104/54 Pulse: 83   Resp: 18 SpO2: 93 % O2 Device: None (Room air)    Constitutional:   awake, alert, cooperative, no apparent distress, and appears stated age     Eyes:   Lids and lashes normal, pupils equal, round and reactive to light, extra ocular muscles intact, sclera clear, conjunctiva normal     Neck:   Supple, symmetrical, trachea midline, no adenopathy, thyroid symmetric, not enlarged and no tenderness, skin normal     Lungs:   No increased work of breathing, good air exchange, clear to auscultation bilaterally, no crackles or wheezing     Cardiovascular:   Normal apical impulse, regular rate and rhythm, normal S1 and S2, no S3 or S4, and no murmur noted     Abdomen:   distended and tenderness noted in the suprapubic region     Musculoskeletal:   There is no redness, warmth, or swelling of the joints.  Full range of motion noted.  Motor strength is 5 out of 5 all extremities bilaterally.  Tone is normal.     Neurologic:   Awake, alert, oriented to name, place and time.  Cranial nerves II-XII are grossly intact.  Motor is 5 out of 5 bilaterally.  Cerebellar finger to nose, heel to shin intact.  Sensory is intact.  Babinski down going, Romberg negative, and gait is normal.     Skin:    Thin skin             Data:   All laboratory and imaging data in the past 24 hours reviewed       Lab Results   Component Value Date     08/06/2021     08/05/2021    Lab Results   Component Value Date    CHLORIDE 95 08/06/2021    CHLORIDE 91 08/05/2021    Lab Results   Component Value Date    BUN 10 08/06/2021    BUN 12 08/05/2021      Lab Results   Component Value Date    POTASSIUM 2.9 08/06/2021    POTASSIUM 2.6 08/06/2021    POTASSIUM 3.8 08/05/2021    Lab Results   Component Value Date    CO2 20 08/06/2021    CO2 25 08/05/2021    Lab Results   Component Value Date    CR 0.57 08/06/2021    CR 0.59 08/05/2021        Lab Results   Component Value Date    WBC 17.4 (H) 08/06/2021    WBC 9.8 08/05/2021    HGB 10.2 (L) 08/06/2021    HGB 10.6 (L) 08/05/2021    HCT 30.7 (L) 08/06/2021    HCT 32.5 (L) 08/05/2021    MCV 77 (L) 08/06/2021    MCV 77 (L) 08/05/2021     (H) 08/06/2021     (H) 08/05/2021     No results found for: SED  No results found for: INR  Lab Results   Component Value Date     (H) 08/06/2021     (H) 08/05/2021     Lab Results   Component Value Date    WBC 17.4 (H) 08/06/2021    WBC 9.8 08/05/2021     All imaging studies reviewed by me.  CT scan of the abdomen:   CT scan interpreted by radiologist      Order  CT Abdomen Pelvis w Contrast [[id number]] (Order [id number])  Exam Information    Exam Date Exam Time Exam Date Exam Time Accession # Performing Department Results    8/6/21  2:16 PM 8/6/21  2:31 PM [id number] [facility] CT    PACS Images     Show images for CT Abdomen Pelvis w Contrast   Study Result    Narrative & Impression   EXAM: CT ABDOMEN PELVIS W CONTRAST  LOCATION: [facility]  DATE/TIME: 8/6/2021 2:16 PM     INDICATION: Abdominal pain, acute, nonlocalized, Epigastric pain, Pancreatitis suspected  COMPARISON: 7/2/2018  TECHNIQUE: CT scan of the abdomen and pelvis was performed following  injection of IV contrast. Multiplanar reformats were obtained. Dose reduction techniques were used.  CONTRAST: 100ml Isovue 370     FINDINGS:   LOWER CHEST: Small bilateral effusions and associated atelectasis, right greater than left. There other scattered groundglass opacities and bronchial wall thickening which may suggest superimposed infection. Coronary atherosclerosis. Trace pericardial   effusion     HEPATOBILIARY: Hepatic steatosis. Periportal edema. Hepatic cysts. Mild biliary ectasia related to postcholecystectomy status.     PANCREAS: Similar cyst in the tail the pancreas measuring 1.1 cm. No peripancreatic inflammatory changes or pancreatic ductal dilation.     SPLEEN: Normal.     ADRENAL GLANDS: Normal.     KIDNEYS/BLADDER: No significant mass, stones, or hydronephrosis. There are simple or benign cysts. No follow up is needed.     BOWEL: There is a thick-walled air and fluid containing collection measuring approximately 6.5 x 5.4 x 5.5 cm abutting the sigmoid colon and compressing the uterus (series 3/173). There is an additional smaller collection superior to the sigmoid colon   measuring 4.5 x 2.9 cm (series 3/146). There is extensive wall thickening and scattered colonic diverticulum. Additionally, there is scattered free air and pelvic ascites with mild peritoneal enhancement. Scattered pneumoperitoneum in the upper abdomen.   There are a few mildly distended loops of small and large bowel without evidence of obstruction likely related to reactive ileus.     LYMPH NODES: There are a few mildly enlarged retroperitoneal and pericolonic subcentimeter lymph nodes which are likely reactive.     VASCULATURE: Extensive calcified and noncalcified atherosclerotic plaque seen throughout the aorta. There are several areas of irregular noncalcified plaque. Aneurysmal dilatation of the infrarenal aorta measures 3.2 x 3.1 cm.     PELVIC ORGANS: Fibroid uterus.     MUSCULOSKELETAL: Degenerative changes of the  spine and bilateral hips. Osteopenia.                                                                      IMPRESSION:   1.  Findings indicative of acute perforated sigmoid diverticulitis with 2 pelvic abscesses. The largest measures 6.5 x 5.4 cm and may be amenable to percutaneous drainage.  2.  Additionally, there is small volume ascites with peritoneal enhancement indicative of peritonitis. Small volume peritoneum.  3.  Manifestations of third spacing  4.  Small bilateral effusions and associated atelectasis. There are patchy bibasilar opacities as well which may indicate superimposed infection.     Findings were discussed with Akua Ventura the charge nurse at 1520 hours.          Attestation:  I have reviewed today's vital signs, notes, medications, labs and imaging.    Vahid Gautam MD

## 2021-08-06 NOTE — SIGNIFICANT EVENT
Significant Event Note    Time of event: 3:39 PM August 6, 2021    Description of event:  Update to my progress note - remains on further-broadened IV zosyn, CT abdomen/pelvis with contrast resulted as I was paged at 15:29 with results which is notable for perforated sigmoid diverticulitis with at least two abscesses. General surgery consulted (ASAP order) and I called and discussed with RN, ordered to HOLD heparin gtt started by cardiology earlier with acute afib with RVR, continue amiodarone gtt for now for rate control, and additional 1 L NS bolus.     I had met two of her daughters in the ED previously and will plan to call first listed mPOA to provide an update.     Update again: I met with both Krista and Eden, the two daughters, at bedside with Angelica and fully updated them with all of our current known objective data at this time and plan for continued IV antibiotics, rate-control, general surgery urgent consultation, and pain control with replacement of K and close cardiac monitoring on cardiac telemetry floor; patient [facility] from [facility], and bedside RN as well as unit charge RN fully updated with POC.     Discussed with: patient's family/emergency contact and bedside nurse      Carlos Felipe MD  Internal Medicine  [facility]  Phone: #[phone]

## 2021-08-07 PROBLEM — N73.9 PELVIC ABSCESS IN FEMALE: Status: ACTIVE | Noted: 2021-01-01

## 2021-08-07 PROBLEM — A41.9 SEPSIS WITHOUT ACUTE ORGAN DYSFUNCTION (H): Status: ACTIVE | Noted: 2021-01-01

## 2021-08-07 PROBLEM — K63.1 PERFORATED SIGMOID COLON (H): Status: ACTIVE | Noted: 2021-01-01

## 2021-08-07 NOTE — PROGRESS NOTES
"[facility]    Medicine Progress Note - Hospitalist Service       Date of Admission:  8/5/2021    Assessment & Plan       Angelica Toro is a 89 year old female admitted on 8/5/2021. She has a history of bilateral ovarian masses, CAD, HTN, HLD, and chronic hyponatremia presents with fever and chills and admitted for acute cystitis. Her course was complicated by new-onset atrial fibrillation with RVR for which cardiology was consulted and amiodarone and heparin infusions started. Acute onset of ongoing abdominal pain, without rigidity or signs of peritonitis, and abnormal plain film abdominal imaging with wall swelling/inflammation, so will further evaluate with CT abdominal scan and also broaden antibiotics to zosyn as she meets sepsis criteria overnight with her rapid response.     Sepsis secondary to:   Acute Perforated Sigmoid Diverticulitis with Abscesses  Pelvic Abscess  ?Uterine Abscess  -CT scan on 8/6 demonstrated above acute findings, and general surgery and IR consulted, and subsequently IR  -Discussed with IR directly, plan to place drain today, 8/7  -Gynecology recommended ongoing conservative medical management without surgery as tolerated, and recommended IR drain placement \"without regard to possible uterine origin\"  -Already on broadened IV zosyn as on 8/6 (transitioned before CT scan resulted and when abdominal pain began)  -Heparin gtt held given drain placement as well as possibility of more invasive procedures if recommended   -Added vancomycin as diaphoretic this AM and while pending lactic acid, troponin.   -Discussed with general surgery today    Atrial Fibrillation with Rapid Ventricular Response  -Telemetry.  -Optimize potassium and magnesium. Keep K > 4.0, Mag > 2.0.   -RN potassium and magnesium replacement protocols utilized/ordered.   -Order home medications including AV-rick rate control medication. Home diltiazem already ordered. IV metoprolol ordered " additionally as-needed to maintain goal HR < 110 bpm.  -Consult to cardiology appreciated   -Consider anticoagulation later with warfarin/DOAC if c/w patient's goals of care.  -If any acute or further sustained rapid rates develop, obtain EKG to evaluate for RVR and initiate cardiac telemetry and update HMS team.   -On amiodarone gtt now - management as per cardiology  -Holding heparin gtt given above    Hypokalemia  -K  2.6  -On K and mag protocols  -Additional K ordered to achieve goal of > 4.0 K and > 2.0 mag  -Likely precipitously low on 8/6 causing RVR, and likely from acute enteral losses with gut/colonic perforation as noted    Acute Cystitis - Urinary Tract Infection  -Presented with fever and chills, temp max 101.2 here. However, no tachypna or tachycardia or leukocytosis.   -Urinalysis with reflex culture ordered showed +nitrites.   -Delirium precautions.   -If becomes acutely agitated or develops acute delirium, utilize verbal redirection first, and involve patient's family/contacts if available. Pharmacologic as-needed interventions as second-line, and would only judiciously consider 1:1 sitter and/or restraints if patient becomes a physical danger to self and/or to others/staff or if previous interventions unsuccessful or not effective.   -Now on Zosyn given above as well    Chronic Hyponatremia  -Na was 128 in 3/10/2021, and most recently was 124 on 7/14/2021 a month ago  -Na at admission is also 124 and unchanged from a month ago and baseline  -PCP outpatient follow-up and monitoring only as needed or indicated, clinical status changes.  -Na noted 127, still at baseline.     HTN  CAD  -Order home medications.    HLD  -Order home statin.    Dispo  -PT, OT evals  -CM/SW         Diet: NPO for Medical/Clinical Reasons Except for: Meds    DVT Prophylaxis: heparin gtt  Ferrer Catheter: Not present  Central Lines: None  Code Status: No CPR- Do NOT Intubate      Disposition Plan   Expected discharge: 2 days  recommended to TBD once adequate pain management/ tolerating PO medications, antibiotic plan established, safe disposition plan/ TCU bed available and SIRS/Sepsis treated.     The patient's care was discussed with the Care Coordinator/ and Patient.    Carlos Felipe MD  Hospitalist Service  [facility]  Securely message with the Vocera Web Console (learn more here)  Text page via Bench Paging/Directory      Clinically Significant Risk Factors Present on Admission         # Hyponatremia: Na = 127 mmol/L (Ref range: 136 - 145 mmol/L) on admission, will monitor as appropriate      # Platelet Defect: home medication list includes an antiplatelet medication      ______________________________________________________________________    Interval History   See my second progress note (significant event) yesterday.   This morning she had acute diaphoresis but stable vitals with BP 160s/80s, HR in 80s, afebrile, normal respiratory rate. Diaphoretic on exam, otherwise unchanged from yesterday late afternoon. No chest pain, no SOB, no other new complaints. Abd pain and nausea ongoing.     I discussed with IR already.    I discussed with general surgery this morning.     Discussed POC with RN, unit charge RN as well.     Data reviewed today: I reviewed all medications, new labs and imaging results over the last 24 hours. I personally reviewed the EKG tracing showing afib with RVR.    Physical Exam   Vital Signs: Temp: 97.9  F (36.6  C) Temp src: Oral BP: 103/54 Pulse: 75   Resp: 18 SpO2: 92 % O2 Device: None (Room air)    Weight: 128 lbs 12.8 oz  GENERAL:  Alert, appears diaphoretic, in mild acute distress, appears stated age, on room air, stable vitals   HEAD:  Normocephalic, without obvious abnormality, atraumatic   EYES:  PERRL, conjunctiva/corneas clear, no scleral icterus, EOM's intact   NOSE: Nares normal, septum midline, mucosa normal, no drainage   THROAT: Lips, mucosa, and tongue  normal; teeth and gums normal, mouth moist   NECK: Supple, symmetrical, trachea midline   BACK:   Symmetric, no curvature, ROM normal   LUNGS:   Clear to auscultation bilaterally, no rales, rhonchi, or wheezing, symmetric chest rise on inhalation, respirations unlabored   CHEST WALL:  No tenderness or deformity   HEART:  Regular rate and rhythm, S1 and S2 normal, no murmur, rub, or gallop    ABDOMEN:   Soft, unchanged moderate tenderness diffusely and throughout, no rigidity, no rebound, no involuntary guarding, bowel sounds hypoactive all four quadrants, no masses, no organomegaly, no rebound or guarding   EXTREMITIES: Extremities normal, atraumatic, no cyanosis or edema    SKIN: Dry to touch, no exanthems in the visualized areas   NEURO: Alert, oriented x 4, moves all four extremities freely, non-focal   PSYCH: Cooperative, behavior is appropriate      Data   Recent Labs   Lab 08/07/21  0440 08/06/21 2021 08/06/21  1106 08/06/21  1019 08/06/21  0702 08/06/21  0649 08/05/21  1515   WBC  --   --  17.4*  --   --   --  9.8   HGB  --   --  10.2*  --   --   --  10.6*   MCV  --   --  77*  --   --   --  77*   PLT  --   --  459*  --   --   --  503*   NA  --   --   --   --  127*  --  124*   POTASSIUM 4.5 5.0  --  2.9* 2.6*  --  3.8   CHLORIDE  --   --   --   --  95*  --  91*   CO2  --   --   --   --  20*  --  25   BUN  --   --   --   --  10  --  12   CR  --   --   --   --  0.57*  --  0.59*   ANIONGAP  --   --   --   --  12  --  8   ABIGAIL  --   --   --   --  8.0*  --  8.6   GLC  --   --   --   --  117 130* 110   ALBUMIN  --   --   --   --   --   --  2.4*   PROTTOTAL  --   --   --   --   --   --  6.9   BILITOTAL  --   --   --   --   --   --  0.6   ALKPHOS  --   --   --   --   --   --  126*   ALT  --   --   --   --   --   --  26   AST  --   --   --   --   --   --  22   LIPASE  --   --   --   --  24  --   --      Recent Results (from the past 24 hour(s))   XR Abdomen 1 View    Narrative    EXAM: XR ABDOMEN 1 VIEW  LOCATION: [facility]  DATE/TIME: 8/6/2021 7:05 AM    INDICATION: abdominal pain  COMPARISON: CT enterography 07/02/2019      Impression    IMPRESSION:     Decompressed stomach. 2 adjacent loops of gas containing small bowel in the left lower quadrant are mildly gas distended and may have mild wall thickening. Other small bowel loops in the central pelvis/right lower quadrant are normal caliber. Normal   colonic gas pattern.  Nothing for obstruction or free air.      Atheromatous calcifications in the left related to splenic artery. No nephrolithiasis.   CT Abdomen Pelvis w Contrast    Narrative    EXAM: CT ABDOMEN PELVIS W CONTRAST  LOCATION: [facility]  DATE/TIME: 8/6/2021 2:16 PM    INDICATION: Abdominal pain, acute, nonlocalized, Epigastric pain, Pancreatitis suspected  COMPARISON: 7/2/2018  TECHNIQUE: CT scan of the abdomen and pelvis was performed following injection of IV contrast. Multiplanar reformats were obtained. Dose reduction techniques were used.  CONTRAST: 100ml Isovue 370    FINDINGS:   LOWER CHEST: Small bilateral effusions and associated atelectasis, right greater than left. There other scattered groundglass opacities and bronchial wall thickening which may suggest superimposed infection. Coronary atherosclerosis. Trace pericardial   effusion    HEPATOBILIARY: Hepatic steatosis. Periportal edema. Hepatic cysts. Mild biliary ectasia related to postcholecystectomy status.    PANCREAS: Similar cyst in the tail the pancreas measuring 1.1 cm. No peripancreatic inflammatory changes or pancreatic ductal dilation.    SPLEEN: Normal.    ADRENAL GLANDS: Normal.    KIDNEYS/BLADDER: No significant mass, stones, or hydronephrosis. There are simple or benign cysts. No follow up is needed.    BOWEL: There is a thick-walled air and fluid containing collection measuring approximately 6.5 x 5.4 x 5.5 cm abutting the sigmoid colon and compressing the uterus (series 3/173).  There is an additional smaller collection superior to the sigmoid colon   measuring 4.5 x 2.9 cm (series 3/146). There is extensive wall thickening and scattered colonic diverticulum. Additionally, there is scattered free air and pelvic ascites with mild peritoneal enhancement. Scattered pneumoperitoneum in the upper abdomen.   There are a few mildly distended loops of small and large bowel without evidence of obstruction likely related to reactive ileus.    LYMPH NODES: There are a few mildly enlarged retroperitoneal and pericolonic subcentimeter lymph nodes which are likely reactive.    VASCULATURE: Extensive calcified and noncalcified atherosclerotic plaque seen throughout the aorta. There are several areas of irregular noncalcified plaque. Aneurysmal dilatation of the infrarenal aorta measures 3.2 x 3.1 cm.    PELVIC ORGANS: Fibroid uterus.    MUSCULOSKELETAL: Degenerative changes of the spine and bilateral hips. Osteopenia.      Impression    IMPRESSION:   1.  Findings indicative of acute perforated sigmoid diverticulitis with 2 pelvic abscesses. The largest measures 6.5 x 5.4 cm and may be amenable to percutaneous drainage.  2.  Additionally, there is small volume ascites with peritoneal enhancement indicative of peritonitis. Small volume peritoneum.  3.  Manifestations of third spacing  4.  Small bilateral effusions and associated atelectasis. There are patchy bibasilar opacities as well which may indicate superimposed infection.    Findings were discussed with Akua Ventura the charge nurse at 1520 hours.      Medications     amiodarone Stopped (08/06/21 2106)     [Held by provider] heparin Stopped (08/06/21 1552)       acetaminophen  650 mg Oral Once     aspirin  81 mg Oral Daily     carvedilol  6.25 mg Oral BID w/meals     diltiazem ER COATED BEADS  180 mg Oral Daily    And     diltiazem ER COATED BEADS  120 mg Oral Daily     piperacillin-tazobactam  3.375 g Intravenous Q8H     sodium chloride (PF)  3 mL  Intracatheter Q8H     valsartan  80 mg Oral QPM

## 2021-08-07 NOTE — CONSULTS
CONSULTATION - GYN    NAME: Angelica Toro   : 10/2/1929   MRN: [id number]      [facility]    ADMISSION DATE: 2021    PCP:  Karma Bethea     CHIEF COMPLAINT: pelvic pain     HPI: I have been requested by Dr Gautam to evaluate Angelica Toro for pelvic abscess. Patient is a 89 year old  female. History is obtained through the patient and chart review.      No LMP recorded. Patient is postmenopausal. S/P adnexectomy in March at [facility] with finding of ovarian carcinoid.    PAST MEDICAL HISTORY:  Past Medical History:   Diagnosis Date     Breast cancer (H)     left, s/p lumpectomy     CAD (coronary artery disease)     MI 2010 s/p stent     Diastolic dysfunction      Diverticulosis      HLD (hyperlipidemia)      HTN (hypertension)      Hx SBO      Right ovarian cyst      STEMI (ST elevation myocardial infarction) (H) 10/28/2010    inferior     Urinary incontinence        PAST SURGICAL HISTORY:  Past Surgical History:   Procedure Laterality Date     ANGIOPLASTY       ANKLE FRACTURE SURGERY Right      APPENDECTOMY       APPENDECTOMY  1975     AS CORNEAL TRANSPLANT,LAMELLAR       CATARACT EXTRACTION Bilateral      CHOLECYSTECTOMY       CHOLECYSTECTOMY  1975     CORNEAL TRANSPLANT Bilateral 2011     FRACTURE TX, ANKLE RT/LT       LAPAROSCOPIC SALPINGO-OOPHORECTOMY Bilateral 3/26/2021    Procedure: Laparoscopic BSO. Cystoscopy;  Surgeon: Juanjose Camejo MD;  Location: [facility] OR     LIGATE VEIN       LUMPECTOMY BREAST Left      LUMPECTOMY BREAST Left 03/10/2017     AK PATIENT HAS A CORONARY ARTERY STENT       US BREAST BIOPSY LT       VEIN SURGERY Right        SOCIAL HISTORY:  Social History     Socioeconomic History     Marital status: Single     Spouse name: Not on file     Number of children: Not on file     Years of education: Not on file     Highest education level: Not on file   Occupational History     Not on file   Tobacco Use     Smoking status: Never Smoker      Smokeless tobacco: Never Used   Substance and Sexual Activity     Alcohol use: Yes     Alcohol/week: 1.0 standard drinks     Types: 1 Cans of beer per week     Comment: 3 times a year     Drug use: No     Sexual activity: Not Currently   Other Topics Concern     Not on file   Social History Narrative     Not on file     Social Determinants of Health     Financial Resource Strain:      Difficulty of Paying Living Expenses:    Food Insecurity:      Worried About Running Out of Food in the Last Year:      Ran Out of Food in the Last Year:    Transportation Needs:      Lack of Transportation (Medical):      Lack of Transportation (Non-Medical):    Physical Activity:      Days of Exercise per Week:      Minutes of Exercise per Session:    Stress:      Feeling of Stress :    Social Connections:      Frequency of Communication with Friends and Family:      Frequency of Social Gatherings with Friends and Family:      Attends Holiness Services:      Active Member of Clubs or Organizations:      Attends Club or Organization Meetings:      Marital Status:    Intimate Partner Violence:      Fear of Current or Ex-Partner:      Emotionally Abused:      Physically Abused:      Sexually Abused:        MEDICATIONS:  Current Facility-Administered Medications   Medication     acetaminophen (TYLENOL) tablet 650 mg     acetaminophen (TYLENOL) tablet 650 mg     amiodarone (NEXTERONE) 900 mg in sodium chloride 0.9 % 500 mL infusion     aspirin EC tablet 81 mg     carvedilol (COREG) tablet 6.25 mg     diltiazem ER COATED BEADS (CARDIZEM CD/CARTIA XT) 24 hr capsule 180 mg    And     diltiazem ER COATED BEADS (CARDIZEM CD/CARTIA XT) 24 hr capsule 120 mg     [Held by provider] heparin 25,000 units in 0.45% NaCl 250 mL ANTICOAGULANT infusion     lidocaine (LMX4) cream     lidocaine 1 % 0.1-1 mL     magnesium hydroxide (MILK OF MAGNESIA) suspension 30 mL     melatonin tablet 1 mg     metoprolol (LOPRESSOR) injection 5 mg     ondansetron  "(ZOFRAN-ODT) ODT tab 4 mg    Or     ondansetron (ZOFRAN) injection 4 mg     piperacillin-tazobactam (ZOSYN) 3.375 g vial to attach to  mL bag     polyethylene glycol (MIRALAX) Packet 17 g     sodium chloride (PF) 0.9% PF flush 3 mL     sodium chloride (PF) 0.9% PF flush 3 mL     valsartan (DIOVAN) tablet 80 mg       ALLERGIES:  Allergies   Allergen Reactions     Amlodipine Cough, Swelling and Other (See Comments)     PN: swollen tongue, irritated throat     Anastrozole Palpitations     High b/p, headache     Lisinopril Anaphylaxis, Shortness Of Breath and Other (See Comments)     Losartan Swelling     PN: severe leg swelling     Sulfa Drugs Swelling and Anaphylaxis     Throat swelling     Thiazide-Type Diuretics Other (See Comments)     Shakes and chills.  Shakes and chills.     Coffee Bean Extract  [Coffea Arabica]      Other reaction(s): Other (See Comments)  PN: decaff coffee     Furosemide Swelling     Hydrochlorothiazide Swelling     Metolazone Other (See Comments)     PN: Trouble breathing and tremors     Morphine GI Disturbance     No Clinical Screening - See Comments Other (See Comments)     PN: decaff coffee       REVIEW OF SYSTEMS    Negative except what is stated in the HPI    PHYSICAL EXAM:  /56   Pulse 79   Temp 97  F (36.1  C) (Oral)   Resp 18   Ht 1.575 m (5' 2\")   Wt 57.6 kg (127 lb 1 oz)   SpO2 93%   BMI 23.24 kg/m     General Appearance: Alert, appropriate appearance for age. No acute distress,   HEENT : Grossly normal  Chest/Respiratory: Normal chest wall and respirations. Clear to auscultation.,   Cardiovascular: Regular rate and rhythm   Gastrointestinal: Soft, minimally tender, drain noted  Pelvic Exam Female:  Deferred,   Musculoskeletal Exam: Back is non-tender, no cva tenderness, moving all four extremities  Skin: no rash or abnormalities,   Neurologic: Normal gait and speech, no tremor  Psychiatric: Alert and oriented, appropriate affect.    LABS  Lab Results   Component " Value Date    HGB 10.2 (L) 2021       Lab Results: personally reviewed.     IMAGING:  Pelvic abscess times two, possibly uterine in origin    Radiology Results: Personally reviewed impression/s      IMPRESSION:  89 year old   female with pelvic abscess    RECOMMENDATIONS:  - agree with admission and antibiotic choices  - Recommend conservative treatment over surgical options  -Specifically recommend further IR treatment without regard to possible uterine origin  - Will follow    Thank you for allowing us to participate in the care of this patient.  Please contact us with any questions/concerns.      Diony Chaparro MD   Cell: [phone]    CC: Karma Bethea, Diony Chaparro MD  Female Pelvic Medicine and Reconstructive Surgery

## 2021-08-07 NOTE — PROGRESS NOTES
Brief IR Note:    Reviewed CT images, there is high concern that the large pelvic abscess is actually intra-uterine in origin and not from sigmoid diverticulitis. Suggest gynecologic consultation for recommendations. There is some pelvic fluid that also appears to be loculated that can benefit from drain placement. Will await gynecologic consult prior to drains as a pelvic drain placement may alter the anatomy and prevent uterine drain placement.    Should patient become unstable prior to Gynecology consult, please contact IR and we will do an emergent drain placement.    Darvin Vásquez M.D.  Vascular and Interventional Radiology  [facility]  Pager: [phone]

## 2021-08-07 NOTE — PROGRESS NOTES
"CLINICAL NUTRITION SERVICES - ASSESSMENT NOTE     Nutrition Prescription    RECOMMENDATIONS FOR MDs/PROVIDERS TO ORDER:  Advance diet when able vs place NG for nutrition in next 2-3 days    Malnutrition Status:    Severe    Recommendations already ordered by Registered Dietitian (RD):  None    Future/Additional Recommendations:  Watch for feeding plan     REASON FOR ASSESSMENT  Angelica Toro is a/an 89 year old female assessed by the dietitian for Admission Nutrition Risk Screen for positive for 14-23 lb weight loss and eating poorly d/t decreased appetite    Pt presents with new abdominal abscess x 2 d/t perforated sigmoid diverticulitis. Drains to be placed today  Hx chronic hyponatremia and hypokalemia, anemia, CAD, HTN, HLD, dyslipidemia    NUTRITION HISTORY  Pt seen just out of surgery for drain placement.   She reports intake < 50% x 1 week PTA. She states she normally has a sweet tooth but sweets were tasting bad and making her feel sick last week.    CURRENT NUTRITION ORDERS  Diet: NPO since 1600 yesterday.   Was on Regular diet since admit    Intake/Tolerance: No meal intakes documented.   Good fluid intake. Drank 960 ml from admit to NPO   Received 1L NS yesterday    LABS  Labs reviewed  K+ improved, 4.5  Na low, 127 8/6  Mag 1.7, low but improved    MEDICATIONS  Medications reviewed  Iv abx  K+ replacement yesterday    ANTHROPOMETRICS  Height: 157.5 cm (5' 2\")  Most Recent Weight: 58.4 kg (128 lb 12.8 oz)  8/7. +1 extremity edema  IBW: 50 kg  BMI: Normal BMI  Weight History:   Wt Readings from Last 10 Encounters:   08/07/21 07/14/21 05/24/21 58.4 kg (128 lb 12.8 oz) 5% weight loss x 10 weeks, 2.2% weight loss x 3 weeks  59.7 kg (131 lb 9.6 oz)  61.6 kg  (135 lb 12.8 oz)   04/12/21 60.8 kg (134 lb)   03/26/21 61.9 kg (136 lb 7.4 oz)   02/01/21 62.3 kg (137 lb 4.8 oz)       Dosing Weight:58.4 kg    ASSESSED NUTRITION NEEDS  Estimated Energy Needs: 7591-9017 kcals/day (25 - 30 kcals/kg)  Justification: " Maintenance  Estimated Protein Needs: 58-70 grams protein/day (1 - 1.2 grams of pro/kg)  Justification: Hypercatabolism with acute illness and Repletion  Estimated Fluid Needs: 3940-0156 mL/day (25 - 30 mL/kg)   Justification: Maintenance    PHYSICAL FINDINGS  See malnutrition section below.    MALNUTRITION  % Intake: </= 50% for >/= 5 days (severe)  % Weight Loss: None noted  Subcutaneous Fat Loss: Orbital - moderate  Muscle Loss: Temporal:  Moderate and Facial & jaw region:  Moderate  Some degree of expected age related fat and muscle loss  Fluid Accumulation/Edema: Mild, +1 extremity  Malnutrition Diagnosis: Severe malnutrition in the context of acute illness    NUTRITION DIAGNOSIS  Altered GI function related to perforated diverticulitis as evidenced by need for NPO and drain placement      INTERVENTIONS  Implementation  None at this time.     Goals  Diet advancement vs nutrition support within 2-3 days.  Per surgery: if N/V continues, place NG. If none, ok to ADAT     Monitoring/Evaluation  Progress toward goals will be monitored and evaluated per protocol.

## 2021-08-07 NOTE — PROGRESS NOTES
General Surgery Progress Note:    Hospital Day # 1    ASSESSMENT:   1. Acute cystitis without hematuria    2. Chills        Angelica Toro is a 89 year old female intraabdominal abscess, most likely from diverticulitis    PLAN:   -IR drain placement today  -Continue ABX  -No surgical indication at this time  -If n/v continues would recommend NG placement, ileus not uncommon given infection  -If n/v resolves, would be ok to SONAM Marr PA-C  Pager - [phone]  Phone - [phone]   General Surgery    SUBJECTIVE:   Angelica Toro is feeling worsening n/v, pain about the same    Patient Vitals for the past 24 hrs:   BP Temp Temp src Pulse Resp SpO2 Weight   08/07/21 0838 (!) 164/81 -- -- -- -- -- --   08/07/21 0833 (!) 168/90 -- -- -- -- -- --   08/07/21 0831 (!) 150/71 98.1  F (36.7  C) Oral 87 20 93 % --   08/07/21 0750 131/64 98.1  F (36.7  C) Oral -- 18 91 % --   08/07/21 0316 103/54 97.9  F (36.6  C) Oral 75 18 92 % 58.4 kg (128 lb 12.8 oz)   08/06/21 2329 129/60 98.5  F (36.9  C) Oral 71 18 90 % --   08/06/21 2055 112/57 98.9  F (37.2  C) Oral 97 18 93 % --   08/06/21 1847 116/56 -- -- 79 -- -- --   08/06/21 1532 104/54 97  F (36.1  C) Oral 83 18 93 % --   08/06/21 1411 92/52 98.3  F (36.8  C) Oral 83 18 94 % --   08/06/21 1228 (!) 89/52 -- -- -- -- -- --   08/06/21 1209 94/50 98.1  F (36.7  C) Oral -- 16 94 % --   08/06/21 1033 -- -- -- (!) 131 -- -- --       Physical Exam:  General: NAD, pleasant  CV:RRR  LUNGS:CTA bilaterally  ABD: soft, distended, mild ttp throughout  EXT:no CCE     Lab Results   Component Value Date    WBC 17.8 08/07/2021    HGB 10.4 08/07/2021    HCT 31.5 08/07/2021    MCV 77 08/07/2021     08/07/2021     INR/Prothrombin Time  Recent Labs   Lab 08/06/21  0702   *   CO2 20*   BUN 10     Lab Results   Component Value Date    ALT 26 08/05/2021    AST 22 08/05/2021    ALKPHOS 126 (H) 08/05/2021

## 2021-08-07 NOTE — PHARMACY-VANCOMYCIN DOSING SERVICE
Pharmacy Vancomycin Initial Note  Date of Service 2021  Patient's  10/2/1929  89 year old, female    Indication: Abscess    Current estimated CrCl = Estimated Creatinine Clearance: 59.3 mL/min (A) (based on SCr of 0.57 mg/dL (L)).    Creatinine for last 3 days  2021:  3:15 PM Creatinine 0.59 mg/dL  2021:  7:02 AM Creatinine 0.57 mg/dL    Recent Vancomycin Level(s) for last 3 days  No results found for requested labs within last 72 hours.      Vancomycin IV Administrations (past 72 hours)      No vancomycin orders with administrations in past 72 hours.                Nephrotoxins and other renal medications (From now, onward)    Start     Dose/Rate Route Frequency Ordered Stop    21 0400  vancomycin 1000 mg in 0.9% NaCl 250 mL intermittent infusion 1,000 mg      1,000 mg  over 60 Minutes Intravenous EVERY 18 HOURS 21 0936      21 1000  vancomycin 1000 mg in 0.9% NaCl 250 mL intermittent infusion 1,000 mg      1,000 mg  over 60 Minutes Intravenous ONCE 21 0921      21 2100  piperacillin-tazobactam (ZOSYN) 3.375 g vial to attach to  mL bag     Note to Pharmacy: Extended infusion dosing to start 6 hours after initial infusion.    3.375 g  over 240 Minutes Intravenous EVERY 8 HOURS 21 1315            Contrast Orders - past 72 hours (72h ago, onward)    Start     Dose/Rate Route Frequency Ordered Stop    21 1430  iopamidol (ISOVUE-370) solution 100 mL      100 mL Intravenous ONCE 21 1417 21 1421          Loading dose: N/A  Regimen: 1000 mg IV every 18 hours.  Start time: 10:00 on 2021  Exposure target: AUC24 (range)400-600 mg/L.hr   AUC24,ss: 485 mg/L.hr  Probability of AUC24 > 400: 73 %  Ctrough,ss: 14.5 mg/L  Probability of Ctrough,ss > 20: 19 %  Probability of nephrotoxicity (Lodise ELISHA ): 10 %        Plan:  1. Start vancomycin  1000 mg IV q18h.   2. Vancomycin monitoring method: AUC  3. Vancomycin therapeutic monitoring goal:  400-600 mg*h/L  4. Pharmacy will check vancomycin levels as appropriate in 1-3 Days.    5. Serum creatinine levels will be ordered daily for the first week of therapy and at least twice weekly for subsequent weeks.      Ginger Recinos RP

## 2021-08-07 NOTE — H&P (VIEW-ONLY)
CONSULTATION - GYN    NAME: Angelica Toro   : 10/2/1929   MRN: [id number]      [facility]    ADMISSION DATE: 2021    PCP:  Karma Bethea     CHIEF COMPLAINT: pelvic pain     HPI: I have been requested by Dr Gauatm to evaluate Angelica Toro for pelvic abscess. Patient is a 89 year old  female. History is obtained through the patient and chart review.      No LMP recorded. Patient is postmenopausal. S/P adnexectomy in March at [facility] with finding of ovarian carcinoid.    PAST MEDICAL HISTORY:  Past Medical History:   Diagnosis Date     Breast cancer (H)     left, s/p lumpectomy     CAD (coronary artery disease)     MI 2010 s/p stent     Diastolic dysfunction      Diverticulosis      HLD (hyperlipidemia)      HTN (hypertension)      Hx SBO      Right ovarian cyst      STEMI (ST elevation myocardial infarction) (H) 10/28/2010    inferior     Urinary incontinence        PAST SURGICAL HISTORY:  Past Surgical History:   Procedure Laterality Date     ANGIOPLASTY       ANKLE FRACTURE SURGERY Right      APPENDECTOMY       APPENDECTOMY  1975     AS CORNEAL TRANSPLANT,LAMELLAR       CATARACT EXTRACTION Bilateral      CHOLECYSTECTOMY       CHOLECYSTECTOMY  1975     CORNEAL TRANSPLANT Bilateral 2011     FRACTURE TX, ANKLE RT/LT       LAPAROSCOPIC SALPINGO-OOPHORECTOMY Bilateral 3/26/2021    Procedure: Laparoscopic BSO. Cystoscopy;  Surgeon: Juanjose Camejo MD;  Location: [facility] OR     LIGATE VEIN       LUMPECTOMY BREAST Left      LUMPECTOMY BREAST Left 03/10/2017     ND PATIENT HAS A CORONARY ARTERY STENT       US BREAST BIOPSY LT       VEIN SURGERY Right        SOCIAL HISTORY:  Social History     Socioeconomic History     Marital status: Single     Spouse name: Not on file     Number of children: Not on file     Years of education: Not on file     Highest education level: Not on file   Occupational History     Not on file   Tobacco Use     Smoking status: Never Smoker      Smokeless tobacco: Never Used   Substance and Sexual Activity     Alcohol use: Yes     Alcohol/week: 1.0 standard drinks     Types: 1 Cans of beer per week     Comment: 3 times a year     Drug use: No     Sexual activity: Not Currently   Other Topics Concern     Not on file   Social History Narrative     Not on file     Social Determinants of Health     Financial Resource Strain:      Difficulty of Paying Living Expenses:    Food Insecurity:      Worried About Running Out of Food in the Last Year:      Ran Out of Food in the Last Year:    Transportation Needs:      Lack of Transportation (Medical):      Lack of Transportation (Non-Medical):    Physical Activity:      Days of Exercise per Week:      Minutes of Exercise per Session:    Stress:      Feeling of Stress :    Social Connections:      Frequency of Communication with Friends and Family:      Frequency of Social Gatherings with Friends and Family:      Attends Adventism Services:      Active Member of Clubs or Organizations:      Attends Club or Organization Meetings:      Marital Status:    Intimate Partner Violence:      Fear of Current or Ex-Partner:      Emotionally Abused:      Physically Abused:      Sexually Abused:        MEDICATIONS:  Current Facility-Administered Medications   Medication     acetaminophen (TYLENOL) tablet 650 mg     acetaminophen (TYLENOL) tablet 650 mg     amiodarone (NEXTERONE) 900 mg in sodium chloride 0.9 % 500 mL infusion     aspirin EC tablet 81 mg     carvedilol (COREG) tablet 6.25 mg     diltiazem ER COATED BEADS (CARDIZEM CD/CARTIA XT) 24 hr capsule 180 mg    And     diltiazem ER COATED BEADS (CARDIZEM CD/CARTIA XT) 24 hr capsule 120 mg     [Held by provider] heparin 25,000 units in 0.45% NaCl 250 mL ANTICOAGULANT infusion     lidocaine (LMX4) cream     lidocaine 1 % 0.1-1 mL     magnesium hydroxide (MILK OF MAGNESIA) suspension 30 mL     melatonin tablet 1 mg     metoprolol (LOPRESSOR) injection 5 mg     ondansetron  "(ZOFRAN-ODT) ODT tab 4 mg    Or     ondansetron (ZOFRAN) injection 4 mg     piperacillin-tazobactam (ZOSYN) 3.375 g vial to attach to  mL bag     polyethylene glycol (MIRALAX) Packet 17 g     sodium chloride (PF) 0.9% PF flush 3 mL     sodium chloride (PF) 0.9% PF flush 3 mL     valsartan (DIOVAN) tablet 80 mg       ALLERGIES:  Allergies   Allergen Reactions     Amlodipine Cough, Swelling and Other (See Comments)     PN: swollen tongue, irritated throat     Anastrozole Palpitations     High b/p, headache     Lisinopril Anaphylaxis, Shortness Of Breath and Other (See Comments)     Losartan Swelling     PN: severe leg swelling     Sulfa Drugs Swelling and Anaphylaxis     Throat swelling     Thiazide-Type Diuretics Other (See Comments)     Shakes and chills.  Shakes and chills.     Coffee Bean Extract  [Coffea Arabica]      Other reaction(s): Other (See Comments)  PN: decaff coffee     Furosemide Swelling     Hydrochlorothiazide Swelling     Metolazone Other (See Comments)     PN: Trouble breathing and tremors     Morphine GI Disturbance     No Clinical Screening - See Comments Other (See Comments)     PN: decaff coffee       REVIEW OF SYSTEMS    Negative except what is stated in the HPI    PHYSICAL EXAM:  /56   Pulse 79   Temp 97  F (36.1  C) (Oral)   Resp 18   Ht 1.575 m (5' 2\")   Wt 57.6 kg (127 lb 1 oz)   SpO2 93%   BMI 23.24 kg/m     General Appearance: Alert, appropriate appearance for age. No acute distress,   HEENT : Grossly normal  Chest/Respiratory: Normal chest wall and respirations. Clear to auscultation.,   Cardiovascular: Regular rate and rhythm   Gastrointestinal: Soft, minimally tender, drain noted  Pelvic Exam Female:  Deferred,   Musculoskeletal Exam: Back is non-tender, no cva tenderness, moving all four extremities  Skin: no rash or abnormalities,   Neurologic: Normal gait and speech, no tremor  Psychiatric: Alert and oriented, appropriate affect.    LABS  Lab Results   Component " Value Date    HGB 10.2 (L) 2021       Lab Results: personally reviewed.     IMAGING:  Pelvic abscess times two, possibly uterine in origin    Radiology Results: Personally reviewed impression/s      IMPRESSION:  89 year old   female with pelvic abscess    RECOMMENDATIONS:  - agree with admission and antibiotic choices  - Recommend conservative treatment over surgical options  -Specifically recommend further IR treatment without regard to possible uterine origin  - Will follow    Thank you for allowing us to participate in the care of this patient.  Please contact us with any questions/concerns.      Diony Chaparro MD   Cell: [phone]    CC: Karma Bethea, Diony Chaparro MD  Female Pelvic Medicine and Reconstructive Surgery

## 2021-08-07 NOTE — PROGRESS NOTES
Assessment/Plan:  1. New onset atrial fibrillation with RVR: Converted to normal sinus rhythm now.  Discontinued amiodarone iv, start oral 200 mg bid. Continue carvedilol and Diltiazem.  She is going to have pelvic abscess drainage.    Heparin infusion is discontinued.  When it is safe from surgical standpoint, will start anticoagulation Eliquis 2.5 mg bid.    2.  Coronary artery disease: Stable, no chest pain or SOB. ECHO is requested for evaluation of heart function and structure.  She did not have cardiac evaluation over last a few years.    3.  Essential hypertension: On carvedilol 6.25 mg twice a day, diltiazem 180 mg daily, valsartan 80 mg daily.    4.  Acute perforated sigmoid diverticulitis with 2 pelvic abscesses: Please see surgical management for details.    Discussed the management of cardiac issues with her family.    Subjective:  Interval History:  Has no complaints of chest pain or shortness of breath.  Complains of nausea this morning.    Current Medications:  Scheduled Meds:    acetaminophen  650 mg Oral Once     aspirin  81 mg Oral Daily     carvedilol  6.25 mg Oral BID w/meals     diltiazem ER COATED BEADS  180 mg Oral Daily    And     diltiazem ER COATED BEADS  120 mg Oral Daily     piperacillin-tazobactam  3.375 g Intravenous Q8H     sodium chloride (PF)  3 mL Intracatheter Q8H     valsartan  80 mg Oral QPM     vancomycin  1,000 mg Intravenous Once     [START ON 8/8/2021] vancomycin  1,000 mg Intravenous Q18H     Continuous Infusions:    amiodarone Stopped (08/06/21 2106)     [Held by provider] heparin Stopped (08/06/21 1552)     PRN Meds:.acetaminophen, lidocaine 4%, lidocaine (buffered or not buffered), magnesium hydroxide, melatonin, metoprolol, ondansetron **OR** ondansetron, polyethylene glycol, sodium chloride (PF)    Objective:   Vital signs in last 24 hours:  Temp:  [97  F (36.1  C)-98.9  F (37.2  C)] 98.1  F (36.7  C)  Pulse:  [] 87  Resp:  [16-20] 20  BP: ()/(50-90)  164/81  SpO2:  [90 %-94 %] 93 %  Weight:   [unfilled]     Physical Exam:  General Appearance:   Awake, Alert, No acute distress.   HEENT:  No scleral icterus; the mucous membranes were moist.   Neck: No cervical bruits. No jugular venous distention   Lungs:   Lungs are clear to auscultation. No crackles. No wheezing.   Cardiovascular:   RRR, normal first and second heart sounds with no murmurs. No rubs or gallops.     Abdomen:  Soft. Bowels sounds are present   Extremities: No leg edema. Equal posterior tibial pulsese.   Skin: Warm, Dry. No rashes.   Neurologic: Mood and affect are appropriate. No focal deficits.         Cardiographics:   Report: personally reviewed. .      Tele monitoring - atrial fib converted to normal sinus rhythm        Lab Results:   personally reviewed.     Lab Results   Component Value Date     08/06/2021    CO2 20 08/06/2021    BUN 10 08/06/2021     Lab Results   Component Value Date    TROPONINI 0.07 08/07/2021     Lab Results   Component Value Date    WBC 17.4 08/06/2021    HGB 10.2 08/06/2021    HCT 30.7 08/06/2021    MCV 77 08/06/2021     08/06/2021

## 2021-08-07 NOTE — PROCEDURES
Interventional Radiology Post-Procedure Note   ?   Brief Procedure Note:   Patient name: Angelica Toro  Pt MRN:[id number]   Date of procedure: 8/7/2021     Procedure(s): CT Guided right transgluteal drain placement  Sedation method: Moderate sedation was employed. The patient was monitored by an interventional radiology nurse at all times throughout the procedure under my direct guidance.  Pre Procedure Diagnosis: Uterine abscess, peritoneal infected fluid  Post Procedure Diagnosis: Same  Indications: Pelvic abscess, sepsis   ?   Attending: Darvin Vásquez M.D.  Specimen(s) removed: 3cc purulent fluid sent for gram stain and culture  Additional studies ordered: None  Drains: 12 Fr uterine drain  Estimated Blood Loss: Minimal  Complications: None  Vascular closure method: N/A    Findings/Notes/Comments: Successful CT guided drain placement into uterine abscess with additional sideholes added to allow for drainage of the loculated pelvic/peritoneal fluid as well. Flush with 10cc of NS twice a day. GUANAKO Suction bulb drainage. Recommend follow up abscessogram in 1 week.  ?   Please see dictation in PACS or under the Imaging tab in [facility] for detailed procedure note.     Darvin Vásquez M.D.   Vascular and Interventional Radiology   Pager: [phone]   After Hours / Scheduling: [phone]     8/7/2021  12:00 PM

## 2021-08-07 NOTE — PLAN OF CARE
Problem: Adult Inpatient Plan of Care  Goal: Plan of Care Review  Outcome: No Change    Problem: Adult Inpatient Plan of Care  Goal: Absence of Hospital-Acquired Illness or Injury  Intervention: Prevent Skin Injury     Problem: Dysrhythmia  Goal: Normalized Cardiac Rhythm  Outcome: No Change  Intervention: Monitor and Manage Cardiac Rhythm Effect     Tele NSR with PACs and sinus arrhythmia. NPO in anticipation for procedure at 0930. Assist x1 to bedside commode. Frequency with urination. Bladder scanned for 276 mL. Complains of mild abdominal pain with movement, denies medication interventions. +1 edema to BLE. Abdomen is rounded, distended and tender on palpation. Will continue to monitor.     Elise Hester RN, 8/7/2021 7:05 AM

## 2021-08-07 NOTE — PLAN OF CARE
Problem: Adult Inpatient Plan of Care  Goal: Plan of Care Review  Outcome: Improving    Pt converted from Afib to NSR with HR in the 70's, at 2030. MD notified, new order to stop Amiodarone drip, will continue to monitor. IV fluids running, IV antibiotics given. Pt is NPO, except sips with medications. She is assist of one to the commode. K and mag protocols, recheck in the morning.

## 2021-08-08 NOTE — PROGRESS NOTES
08/08/21 0845   Quick Adds   Type of Visit Initial PT Evaluation   Living Environment   People in home alone   Current Living Arrangements condominium   Home Accessibility no concerns   Transportation Anticipated family or friend will provide   Self-Care   Usual Activity Tolerance good   Current Activity Tolerance poor   Equipment Currently Used at Home cane, straight;walker, rolling  (wasn't using an AD at baseline)   Disability/Function   Fall history within last six months no   General Information   Onset of Illness/Injury or Date of Surgery 08/05/21   Referring Physician Amanda Coelho MD   Patient/Family Therapy Goals Statement (PT) feel better   Pertinent History of Current Problem (include personal factors and/or comorbidities that impact the POC) acute cystitis, perforated sigmoid colon, sepsis   Existing Precautions/Restrictions no known precautions/restrictions   Weight-Bearing Status - LLE full weight-bearing   Weight-Bearing Status - RLE full weight-bearing   Range of Motion (ROM)   ROM Comment generalized weakness   Bed Mobility   Bed Mobility supine-sit   Supine-Sit [place] (Bed Mobility) moderate assist (50% patient effort);2 person assist   Transfers   Transfers sit-stand transfer   Sit-Stand Transfer   Sit-Stand [place] (Transfers) moderate assist (50% patient effort);2 person assist   Assistive Device (Sit-Stand Transfers) walker, front-wheeled   Gait/Stairs (Locomotion)   [place] Level (Gait) moderate assist (50% patient effort);maximum assist (25% patient effort);2 person assist   Assistive Device (Gait) walker, front-wheeled   Distance in Feet (Required for LE Total Joints) 3'   Pattern (Gait) step-to   Deviations/Abnormal Patterns (Gait) bilateral deviations;base of support, narrow;stride length decreased;nickie decreased  (unsteady)   Bilateral Gait Deviations heel strike decreased   Clinical Impression   Criteria for Skilled Therapeutic Intervention yes, treatment indicated    PT Diagnosis (PT) impaired functional mobility   Influenced by the following impairments weakness, pain   Functional limitations due to impairments gait, transfers   Clinical Presentation Stable/Uncomplicated   Clinical Presentation Rationale pt presents as medically diagnosed   Clinical Decision Making (Complexity) moderate complexity   Therapy Frequency (PT) Daily   Predicted Duration of Therapy Intervention (days/wks) 2 weeks   Planned Therapy Interventions (PT) bed mobility training;gait training;home exercise program;patient/family education;stair training;strengthening;transfer training   Anticipated Equipment Needs at Discharge (PT) gait belt;walker, rolling;wheelchair   Risk & Benefits of therapy have been explained care plan/treatment goals reviewed;patient   PT Discharge Planning    PT Discharge Recommendation (DC Rec) Transitional Care Facility   PT Rationale for DC Rec Mod A x2 for safety with mobility   Total Evaluation Time   Total Evaluation Time (Minutes) 6

## 2021-08-08 NOTE — PROGRESS NOTES
Care Management Follow Up    Length of Stay (days): 2    Expected Discharge Date: 08/09/2021     Concerns to be Addressed:  Medical Progression      Patient plan of care discussed at interdisciplinary rounds: Yes    Anticipated Discharge Disposition: TCU       Anticipated Discharge Services: TCU   Anticipated Discharge DME:  TBD     Patient/family educated on Medicare website which has current facility and service quality ratings:  Yes   Education Provided on the Discharge Plan:  Yes   Patient/Family in Agreement with the Plan:  Yes     Referrals Placed by CM/SW:    Private pay costs discussed: Not applicable    Additional Information:  SWCM met and introduced self and CM services to Pt.  Daughter, Krista had left room. SWCM explained to Pt recommendations for TCU.  Pt agrees and asked that I talk with her daughter.    SW called Pt's daughter, Krista and states that she lives in [place] and that her sister's live in [place] and would like a TCU in the [facility].  [facility] sent referrals to [facility] and [facility].  [facility] also emailed list to Krista and she will share with her sisters as she wants to research and will call with more choices. PAS needed.         YADI Hassan

## 2021-08-08 NOTE — PROGRESS NOTES
General Surgery Progress Note:    Hospital Day # 2    ASSESSMENT:   1. Acute cystitis without hematuria    2. Chills        Angelica Toro is a 89 year old female intraabdominal abscess, most likely from diverticulitis    PLAN:   -Continue drain, cares per IR  -Continue ABX  -WBC up slightly, but most likely reactive from drain placement yesterday  -No surgical indication at this time  -If n/v continues would recommend NG placement, ileus not uncommon given infection, she is refusing NG currently  -Can do some chips and sips of water, but waiting for ileus to resolve until doing much more    Jamin Marr PA-C  Pager - [phone]  Phone - [phone]   General Surgery    SUBJECTIVE:   Angelica Toro is feeling about the same, n/v this AM, no flatus or BM    Patient Vitals for the past 24 hrs:   BP Temp Temp src Pulse Resp SpO2 Weight   08/08/21 0748 (!) 155/71 97.9  F (36.6  C) Oral 101 18 93 % --   08/08/21 0300 (!) 169/74 97.1  F (36.2  C) Oral 90 20 96 % 62.6 kg (138 lb 0.1 oz)   08/07/21 2338 133/63 98.3  F (36.8  C) Oral 87 22 94 % --   08/07/21 2015 (!) 161/72 97.9  F (36.6  C) Oral 91 16 94 % --   08/07/21 1702 (!) 165/74 97.9  F (36.6  C) Oral 92 16 95 % --   08/07/21 1215 (!) 144/76 -- -- 84 16 96 % --   08/07/21 1200 136/62 -- -- 94 16 95 % --   08/07/21 1155 130/61 -- -- 79 16 98 % --   08/07/21 1150 (!) 140/62 -- -- 85 16 98 % --   08/07/21 1145 139/61 -- -- 78 16 98 % --   08/07/21 1140 (!) 141/56 -- -- 83 16 98 % --   08/07/21 1135 125/60 -- -- 80 16 99 % --   08/07/21 1130 126/60 -- -- 79 16 94 % --   08/07/21 1040 (!) 146/62 97.7  F (36.5  C) Oral 82 16 96 % --       Physical Exam:  General: NAD, pleasant  CV:RRR  LUNGS:CTA bilaterally  ABD: soft, distended, mild ttp throughout, drain with cloudy serous output  EXT:no CCE     Lab Results   Component Value Date    WBC 17.8 08/07/2021    HGB 10.4 08/07/2021    HCT 31.5 08/07/2021    MCV 77 08/07/2021     08/07/2021     INR/Prothrombin  Time  Recent Labs   Lab 08/06/21  0702   *   CO2 20*   BUN 10     Lab Results   Component Value Date    ALT 26 08/05/2021    AST 22 08/05/2021    ALKPHOS 126 (H) 08/05/2021

## 2021-08-08 NOTE — PROGRESS NOTES
Assessment/Plan:  1. New onset atrial fibrillation with RVR: Maintained normal sinus rhythm since yesterday.    Continue amiodarone oral 200 mg bid. Continue carvedilol and Diltiazem.    Please call surgical service for ok to start Eliquis 2.5 mg bid.    2.  Coronary artery disease: Stable, no chest pain or SOB.   Echo was reported left ventricular systolic function is normal, basal inferior lateral aneurysm is present, no significant valvular disease.    3.  Essential hypertension: On carvedilol 6.25 mg twice a day, diltiazem 180 mg daily, valsartan 80 mg daily.    4.  Acute perforated sigmoid diverticulitis with 2 pelvic abscesses: Please see surgical management for details.    Subjective:  Interval History:  Has no complaints of chest pain or shortness of breath.  Complains of nausea this morning.    Current Medications:  Scheduled Meds:    acetaminophen  650 mg Oral Once     amiodarone  200 mg Oral BID     aspirin  81 mg Oral Daily     carvedilol  6.25 mg Oral BID w/meals     diltiazem ER COATED BEADS  180 mg Oral Daily    And     diltiazem ER COATED BEADS  120 mg Oral Daily     piperacillin-tazobactam  3.375 g Intravenous Q8H     potassium chloride  20 mEq Oral or Feeding Tube Once     sodium chloride (PF)  10 mL Irrigation Q8H     sodium chloride (PF)  3 mL Intracatheter Q8H     valsartan  80 mg Oral QPM     vancomycin  1,000 mg Intravenous Q18H     Continuous Infusions:    [Held by provider] heparin Stopped (08/06/21 1552)     PRN Meds:.acetaminophen, lidocaine 4%, lidocaine (buffered or not buffered), magnesium hydroxide, melatonin, metoprolol, ondansetron **OR** ondansetron, polyethylene glycol, prochlorperazine, sodium chloride (PF)    Objective:   Vital signs in last 24 hours:  Temp:  [97.1  F (36.2  C)-98.3  F (36.8  C)] 97.9  F (36.6  C)  Pulse:  [] 101  Resp:  [16-22] 18  BP: (125-169)/(56-76) 155/71  SpO2:  [93 %-99 %] 93 %  Weight:   [unfilled]     Physical Exam:  General Appearance:    Awake, Alert, No acute distress.   HEENT:  No scleral icterus; the mucous membranes were moist.   Neck: No cervical bruits. No jugular venous distention   Lungs:   Lungs are clear to auscultation. No crackles. No wheezing.   Cardiovascular:   RRR, normal first and second heart sounds with no murmurs. No rubs or gallops.     Abdomen:  Soft. Bowels sounds are present   Extremities: No leg edema. Equal posterior tibial pulsese.   Skin: Warm, Dry. No rashes.   Neurologic: Mood and affect are appropriate. No focal deficits.         Cardiographics:   Report: personally reviewed. .      Tele monitoring - atrial fib converted to normal sinus rhythm        Lab Results:   personally reviewed.     Lab Results   Component Value Date     08/06/2021    CO2 20 08/06/2021    BUN 10 08/06/2021     Lab Results   Component Value Date    TROPONINI 0.07 08/07/2021     Lab Results   Component Value Date    WBC 17.4 08/06/2021    HGB 10.2 08/06/2021    HCT 30.7 08/06/2021    MCV 77 08/06/2021     08/06/2021

## 2021-08-08 NOTE — PLAN OF CARE
Patient c/o slight abdominal discomfort but not pain. Not wanting PRNs.  Pt has been spitting up green bile/spit. States she is not really nauseous but she just spits up. Zofran given .  NPO with ice chips and sips, tolerating. Tele NSR.  GUANAKO drain patent with minimal output. Up to chair today, participated in therapy.  IV ABX infusing. K low and replacing per protocol.

## 2021-08-08 NOTE — PROGRESS NOTES
"OBGYN PROGRESS NOTE      Subjective:  Patient is doing well. No new concerns.     Objective:  BP (!) 165/74 (BP Location: Right arm)   Pulse 92   Temp 97.9  F (36.6  C) (Oral)   Resp 16   Ht 1.575 m (5' 2\")   Wt 58.4 kg (128 lb 12.8 oz)   SpO2 95%   BMI 23.56 kg/m    General: no apparent distress   Abdomen:  Soft. Tender to palpation, no rebound or peritoneal signs      Labs:  Lab Results   Component Value Date    HGB 10.4 (L) 08/07/2021     Assessment:  Angelica Toro is a 89 year old female intraabdominal abscess, most likely from diverticulitis.  S/P drain placement today    Plan:   No new changes from a gyn standpoint      Marysol Luis M.D.        "

## 2021-08-08 NOTE — PROGRESS NOTES
08/08/21 0850   Quick Adds   Type of Visit Initial Occupational Therapy Evaluation   Living Environment   People in home alone   Current Living Arrangements condominium   Living Environment Comments Walk in shower with grab bar and shower seat, vanity close to toilet   Self-Care   Usual Activity Tolerance good   Current Activity Tolerance poor   Equipment Currently Used at Home cane, straight;walker, rolling  (FWW)   Instrumental Activities of Daily Living (IADL)   Previous Responsibilities meal prep;housekeeping;laundry;shopping;driving   IADL Comments Started having issues this summer with ADL/IADL participation   Disability/Function   Hearing Difficulty or Deaf no   Wear Glasses or Blind no   Concentrating, Remembering or Making Decisions Difficulty yes   Concentration Management Pt required continuous verbal cues to keep eyes open and head up.    Difficulty Communicating no   Difficulty Eating/Swallowing no   Walking or Climbing Stairs Difficulty yes   Walking or Climbing Stairs ambulation difficulty, assistance 1 person;transferring difficulty, assistance 1 person  (Ax2)   Mobility Management Continuous verbal cues for sequencing   Doing Errands Independently Difficulty (such as shopping) yes   Errands Management Dghtr reports increased difficulty in the last few weeks   Fall history within last six months no   Change in Functional Status Since Onset of Current Illness/Injury yes   General Information   Onset of Illness/Injury or Date of Surgery 08/05/21   Referring Physician Carlos Felipe   Patient/Family Therapy Goal Statement (OT) Get stronger   Existing Precautions/Restrictions abdominal;other (see comments)  (drain)   Left Upper Extremity (Weight-bearing Status) full weight-bearing (FWB)   Right Upper Extremity (Weight-bearing Status) full weight-bearing (FWB)   Left Lower Extremity (Weight-bearing Status) full weight-bearing (FWB)   Right Lower Extremity (Weight-bearing Status) full weight-bearing  (FWB)   General Observations and Info Lethargic during session   Cognitive Status Examination   Orientation Status orientation to person, place and time   Affect/Mental Status (Cognitive) low arousal/lethargic   Follows Commands follows one-step commands;25-49% accuracy;delayed response/completion;increased processing time needed;verbal cues/prompting required   Safety Deficit moderate deficit;severe deficit   Memory Deficit unable/difficult to assess   Attention Deficit moderate deficit;selective attention;requires cues/redirection to task   Executive Function Deficit moderate deficit;self-monitoring/self-correction   Cognitive Status Comments Pt lethargic and nauseous during session. Pt required verbal cues throughout to keep eyes open and head up. Multiple redirections to task/instruction.    Visual Perception   Visual Impairment/Limitations WFL   Pain Assessment   Patient Currently in Pain Yes, see Vital Sign flowsheet   Integumentary/Edema   Integumentary/Edema no deficits were identifed   Posture   Posture not impaired   Range of Motion Comprehensive   General Range of Motion no range of motion deficits identified   Comment, General Range of Motion Limted by generalized weakness   Strength Comprehensive (MMT)   Comment, General Manual Muscle Testing (MMT) Assessment Generalized weakness   Bed Mobility   Bed Mobility rolling left;supine-sit   Rolling Left [place] (Bed Mobility) maximum assist (25% patient effort);2 person assist   Supine-Sit [place] (Bed Mobility) maximum assist (25% patient effort);2 person assist   Assistive Device (Bed Mobility) bed rails;draw sheet   Comment (Bed Mobility) Pt required continuous cueing to participate in session. Hand over hand assist for proper hand placement and sequencing.    Transfers   Transfers bed-chair transfer;sit-stand transfer   Transfer Skill: Bed to Chair/Chair to Bed   Bed-Chair [place] (Transfers) maximum assist (25% patient effort);2 person  assist   Assistive Device (Bed-Chair Transfers) standard walker  (FWW)   Transfer Comments Multiple verbal and tactile cues for proper hand placement. Pt unable to follow commands to push off from bed.    Sit-Stand Transfer   Sit-Stand [place] (Transfers) maximum assist (25% patient effort);2 person assist   Assistive Device (Sit-Stand Transfers) walker, front-wheeled   Sit/Stand Transfer Comments Verbal cues for LE advancement and FWW management. Pt began sitting prior to being in front of chair, verbal cues for safety and sequencing. Pt required assist to be scooted back into chair (dependent) d/t unable to follow commands for side to side scoot.    Activities of Daily Living   BADL Assessment grooming   Grooming Assessment   [place] Level (Grooming) set up;contact guard assist   Position (Grooming) supported sitting;unsupported sitting   Comment (Grooming) Washing face with washcloth   Clinical Impression   Criteria for Skilled Therapeutic Interventions Met (OT) skilled treatment is necessary   OT Diagnosis Weakness, decreased cognition, limited participation in ADL/IADLs   OT Problem List-Impairments impacting ADL problems related to;strength;mobility;cognition;activity tolerance impaired   Assessment of Occupational Performance 1-3 Performance Deficits   Identified Performance Deficits Weakness, decreased cognition, limited participation in ADL/IADLs   Planned Therapy Interventions (OT) ADL retraining;IADL retraining;bed mobility training;cognition;strengthening;transfer training   Clinical Decision Making Complexity (OT) moderate complexity   Therapy Frequency (OT) Daily   Predicted Duration of Therapy 7 days   Risk & Benefits of therapy have been explained evaluation/treatment results reviewed   OT Discharge Planning    OT Discharge Recommendation (DC Rec) Transitional Care Facility   OT Rationale for DC Rec Pt significantly below baseline for strength, cognition, ADL participation, IADL  participation   Total Evaluation Time (Minutes)   Total Evaluation Time (Minutes) 8       ERICK Feliciano/OPHELIA on 8/8/2021

## 2021-08-08 NOTE — PROVIDER NOTIFICATION
Spoke with surgery and they are fine with patient starting eliquis as long as radiology is fine with it s/p drain placement. Paged out to radiology.  Radiology is fine with eliquis being started - paging Dr. Coelho to update.

## 2021-08-08 NOTE — PLAN OF CARE
Problem: Adult Inpatient Plan of Care  Goal: Plan of Care Review  Outcome: No Change  Flowsheets  Taken 8/8/2021 0212 by Carine Lizarraga RN  Outcome Summary: Afbrile. Abd tenderness but patient denies pain. Nausea improved. Continue with antibiotics per orders  Progress: improving    Problem: Hypertension Acute  Goal: Blood Pressure Within Desired Range  Outcome: Improving     Problem: Dysrhythmia  Goal: Normalized Cardiac Rhythm  Outcome: Improving   Remains in NSR

## 2021-08-08 NOTE — PROGRESS NOTES
"[facility]    Medicine Progress Note - Hospitalist Service       Date of Admission:  8/5/2021    Assessment & Plan       Angelica Toro is a 89 year old female admitted on 8/5/2021. She has a history of bilateral ovarian masses, CAD, HTN, HLD, and chronic hyponatremia presents with fever and chills and admitted for acute cystitis. Her course was complicated by new-onset atrial fibrillation with RVR for which cardiology was consulted and amiodarone and heparin infusions started. Acute onset of ongoing abdominal pain, without rigidity or signs of peritonitis, and abnormal plain film abdominal imaging with wall swelling/inflammation, CT scan demonstrated acute perforated sigmoid diverticulitis with 2 pelvic abscesses. The largest measures 6.5 x 5.4 cm. She started on zosyn, underwent IR drained the abscess yesterday. Currently feeling nauseated        Sepsis secondary to:   Acute Perforated Sigmoid Diverticulitis with Abscesses  Pelvic Abscess  ?Uterine Abscess  CT scan on 8/6 demonstrated above acute findings, and general surgery and IR consulted, and subsequently IR placeed drain on 8/7  Gynecology recommended ongoing conservative medical management without surgery as tolerated, and recommended IR drain placement \"without regard to possible uterine origin\"     on broadened IV zosyn and vancomycin, discontinue vancomycin and continue with zosyn       Atrial Fibrillation with RVR  Converted to NSR  Started on amiodarone, continue   Continue Coreg and Diltiazem   Would start her on Eliquis 2.5 mg BID if ok with surgery     Optimize potassium and magnesium. Keep K > 4.0, Mag > 2.0.   RN potassium and magnesium replacement protocols utilized/ordered.   Consult to cardiology appreciated       Hypokalemia  Replace per protocol       UTI  UC grew Ecoli  On Zosyn, continue     Chronic Hyponatremia  Na was 128 in 3/10/2021, and most recently was 124 on 7/14/2021 a month ago  Na at admission is also 124 " and unchanged from a month ago and baseline  PCP outpatient follow-up and monitoring only as needed or indicated, clinical status changes.    Recheck serum Na     HTN  CAD  Order home medications.    HLD  Order home statin.    Dispo  PT, OT evals  CM/SW       Diet: NPO for Medical/Clinical Reasons Except for: Meds    DVT Prophylaxis: heparin gtt  Ferrer Catheter: Not present  Central Lines: None  Code Status: No CPR- Do NOT Intubate      Disposition Plan   Expected discharge: 2 days recommended to TBD once adequate pain management/ tolerating PO medications, antibiotic plan established, safe disposition plan/ TCU bed available and SIRS/Sepsis treated.     The patient's care was discussed with the Care Coordinator/ and Patient.    Amanda Coelho MD  Hospitalist Service  [facility]  Text page via [facility] Paging/Directory      Clinically Significant Risk Factors Present on Admission            # Severe Malnutrition, POA: based on Weight loss;Subcutaneous fat loss;Muscle loss;Fluid retention (08/07/21 1400)     Interval History   Feeling nauseous and complains of abdominal pain       Physical Exam   Vital Signs: Temp: 97.9  F (36.6  C) Temp src: Oral BP: (!) 147/80 Pulse: 96   Resp: 18 SpO2: 93 % O2 Device: None (Room air) Oxygen Delivery: 2 LPM  Weight: 138 lbs .13 oz  Physical Exam:    General Appearance:  Alert, cooperative, looking fatigue, no respiratory distress    Head:  Normocephalic, atraumatic   Eyes:  PERRL    Throat:  mucosa; moist   Neck: No JVD, no LAP   Lungs:   Clear to auscultation bilaterally, respirations unlabored   Chest Wall:  No tenderness or deformity   Heart:  Regular rate and rhythm, S1, S2 normal,no murmur   Abdomen:   Soft, generalized tender, non distended, bowel sounds present, no guarding or rigidity   Extremities: No edema, no joint swelling   Skin: Skin color, texture, turgor normal, no rashes or lesions   Neurologic: Alert and oriented X 3, Moves all 4  extremities       Data   Recent Labs   Lab 21  0510 21  0947 21  0856 21  0844 21  0440 21  1106 21  0702 21  0649 21  1515   WBC 18.4* 17.8*  --   --   --   --  17.4*  --   --  9.8   HGB 9.8* 10.4*  --   --   --   --  10.2*  --   --  10.6*   MCV 78 77*  --   --   --   --  77*  --   --  77*   * 503*  --   --   --   --  459*  --   --  503*   NA  --   --   --   --   --   --   --  127*  --  124*   POTASSIUM 3.4*  --   --   --  4.5 5.0  --  2.6*  --  3.8   CHLORIDE  --   --   --   --   --   --   --  95*  --  91*   CO2  --   --   --   --   --   --   --  20*  --  25   BUN  --   --   --   --   --   --   --  10  --  12   CR  --   --  0.58*  --   --   --   --  0.57*  --  0.59*   ANIONGAP  --   --   --   --   --   --   --  12  --  8   ABIGAIL  --   --   --   --   --   --   --  8.0*  --  8.6   GLC  --   --   --  152*  --   --   --  117 130* 110   ALBUMIN  --   --   --   --   --   --   --   --   --  2.4*   PROTTOTAL  --   --   --   --   --   --   --   --   --  6.9   BILITOTAL  --   --   --   --   --   --   --   --   --  0.6   ALKPHOS  --   --   --   --   --   --   --   --   --  126*   ALT  --   --   --   --   --   --   --   --   --  26   AST  --   --   --   --   --   --   --   --   --  22   LIPASE  --   --   --   --   --   --   --  24  --   --      Recent Results (from the past 24 hour(s))   Echocardiogram Complete    Narrative    [id number]  [id number]  [id number]  [id number]^KEN^[place] [address]     Name: EULOGIO BINGHAM  MRN: [id number]  : 10/02/1929  Study Date: 2021 02:12 PM  Age: 89 yrs  Gender: Female  Patient Location: [place] [address]  Reason For Study: Atrial Fibrillation  Ordering Physician: RUBIA ROGERS  Performed By: GURMEET     BSA: 1.6 m2  Height: 62 in  Weight: 129 lb  HR: 90  BP: 164/81  mmHg  ______________________________________________________________________________  ______________________________________________________________________________  Interpretation Summary     Left ventricular systolic function is normal.  A basal inferolateral aneurysm is present.  Normal right ventricle size and systolic function.  There is mild trileaflet aortic sclerosis.  There is mild to moderate (1-2+) aortic regurgitation.  There is trace to mild tricuspid regurgitation.  The right ventricular systolic pressure is approximated at 34mmHg plus the  right atrial pressure.  ______________________________________________________________________________  Left Ventricle  The left ventricle is normal in size. There is normal left ventricular wall  thickness. Left ventricular systolic function is normal. Left ventricular  diastolic function is normal. Diastolic Doppler findings (E/E' ratio and/or  other parameters) suggest left ventricular filling pressures are  indeterminate. A basal inferolateral aneurysm is present.     Right Ventricle  Normal right ventricle size and systolic function.     Atria  Normal left atrial size. Right atrial size is normal. Intact atrial septum.     Mitral Valve  The mitral valve leaflets appear thickened, but open well. There is trace  mitral regurgitation.     Tricuspid Valve  Tricuspid valve leaflets appear normal. There is trace to mild tricuspid  regurgitation. The right ventricular systolic pressure is approximated at  34mmHg plus the right atrial pressure.     Aortic Valve  There is mild trileaflet aortic sclerosis. There is mild to moderate (1-2+)  aortic regurgitation. No hemodynamically significant valvular aortic stenosis.     Pulmonic Valve  The pulmonic valve is not well visualized.     Vessels  The aorta root is normal. IVC diameter >2.1 cm collapsing <50% with sniff  suggests a high RA pressure estimated at 15 mmHg or greater.     Pericardium  Small pericardial effusion.  There are no echocardiographic indications of  cardiac tamponade.     ______________________________________________________________________________  MMode/2D Measurements & Calculations  RVDd: 3.9 cm  IVSd: 0.95 cm  LVIDd: 5.1 cm  LVIDs: 3.8 cm  LVPWd: 0.92 cm  FS: 25.5 %     LV mass(C)d: 174.4 grams  LV mass(C)dI: 109.9 grams/m2  Ao root diam: 3.4 cm  LA dimension: 4.7 cm  LA/Ao: 1.4  LVOT diam: 1.9 cm  LVOT area: 2.8 cm2  LA Volume Indexed (AL/bp): 33.4 ml/m2  RWT: 0.36     Time Measurements  Aortic HR: 87.0 BPM  MM HR: 88.0 BPM     Doppler Measurements & Calculations  MV E max braydon: 98.0 cm/sec  MV A max braydon: 108.2 cm/sec  MV E/A: 0.91  MV dec slope: 488.2 cm/sec2  MV dec time: 0.20 sec  Ao V2 max: 193.3 cm/sec  Ao max PG: 15.0 mmHg  Ao V2 mean: 128.2 cm/sec  Ao mean P.3 mmHg  Ao V2 VTI: 38.9 cm  SILVIA(I,D): 1.7 cm2  SILVIA(V,D): 1.4 cm2  AI P1/2t: 306.6 msec  LV V1 max PG: 3.8 mmHg  LV V1 max: 97.5 cm/sec  LV V1 VTI: 22.8 cm  CO(LVOT): 5.6 l/min  CI(LVOT): 3.5 l/min/m2  SV(LVOT): 64.2 ml  SI(LVOT): 40.5 ml/m2  PA acc time: 0.09 sec  TR max braydon: 291.1 cm/sec  TR max P.9 mmHg  AV Braydon Ratio (DI): 0.50  SILVIA Index (cm2/m2): 1.0  E/E' av.4  Lateral E/e': 8.9  Medial E/e': 13.9     ______________________________________________________________________________  Report approved by: Sarah Larios 2021 05:26 PM           Medications     [Held by provider] heparin Stopped (21 1552)       acetaminophen  650 mg Oral Once     amiodarone  200 mg Oral BID     aspirin  81 mg Oral Daily     carvedilol  6.25 mg Oral BID w/meals     diltiazem ER COATED BEADS  180 mg Oral Daily    And     diltiazem ER COATED BEADS  120 mg Oral Daily     piperacillin-tazobactam  3.375 g Intravenous Q8H     potassium chloride  20 mEq Oral or Feeding Tube Once     potassium chloride  10 mEq Intravenous Q1H     sodium chloride (PF)  10 mL Irrigation Q8H     sodium chloride (PF)  3 mL Intracatheter Q8H     valsartan  80 mg Oral  QPM     vancomycin  1,000 mg Intravenous Q18H

## 2021-08-09 NOTE — PLAN OF CARE
Problem: Risk for Delirium  Goal: Optimal Coping  Outcome: No Change     Pt A&O.  Very flat affect and withdrawn.  Only answers questions when directly asked.  Pt encouraged to move and to help with transfers and turns, pt has very little motivation.  2 assist with walker and gait belt to transfer to commode.  No complaints of pain.  No N/V.  GUANAKO drain draining scant amounts of tan/milky drainage.     Pt NPO due to illius, pt taking in ice chips.  D5NS maintenance IV fluids at 50mL/hr started last night.

## 2021-08-09 NOTE — PROGRESS NOTES
"  HEART CARE CONSULTATON NOTE        Assessment/Recommendations   Assessment:  1.  Atrial fibrillation: New onset atrial fibrillation with rapid ventricular response converted to normal sinus rhythm maintained on amiodarone, carvedilol and diltiazem  2.  Anticoagulation: [id number] score of 5 for gender, age, hypertension, CAD.  Started on age-adjusted dose of Eliquis at 2.5 mg twice daily  3.  Coronary artery disease: History of MI 2010 status post stent with inferolateral aneurysm present on echocardiogram  4.  Hypertension: Well-controlled  5.  Acute perforated sigmoid diverticulitis with 2 pelvic abscesses followed by surgery    Plan:  1.  Continue Eliquis for anticoagulation  2.  Continue amiodarone 200 mg twice a day for the next 2 weeks and then change to 200 mg daily  3.  Continue on diltiazem and carvedilol.  Rates and blood pressure well controlled.  Will not make any further adjustments given patient does have history of multiple intolerances to medications in the past  May follow-up in atrial fibrillation clinic on discharge.  No further recommendations.  Please call if any further questions or concerns.       History of Present Illness/Subjective    Telemetry showed brief A. fib with RVR yesterday otherwise normal sinus rhythm.  No complaints of chest pain or shortness of breath       Physical Examination  Review of Systems   VITALS: /58   Pulse 67   Temp 97.3  F (36.3  C) (Oral)   Resp 16   Ht 1.575 m (5' 2\")   Wt 63.6 kg (140 lb 3.4 oz)   SpO2 92%   BMI 25.65 kg/m    BMI: Body mass index is 25.65 kg/m .  Wt Readings from Last 3 Encounters:   08/09/21 63.6 kg (140 lb 3.4 oz)   04/12/21 60.8 kg (134 lb)   03/26/21 61.9 kg (136 lb 7.4 oz)       Intake/Output Summary (Last 24 hours) at 8/9/2021 0923  Last data filed at 8/9/2021 0500  Gross per 24 hour   Intake 725.83 ml   Output 775 ml   Net -49.17 ml     General Appearance:   no distress, normal body habitus   ENT/Mouth: membranes moist, " no oral lesions or bleeding gums.      EYES:  no scleral icterus, normal conjunctivae   Neck: no carotid bruits or thyromegaly   Chest/Lungs:   lungs are clear to auscultation, no rales or wheezing   Cardiovascular:   Regular. Normal first and second heart sounds with no murmurs   Abdomen:  no organomegaly, masses, bruits, or tenderness; bowel sounds are present   Extremities: no cyanosis or clubbing   Skin: no xanthelasma, warm.    Neurologic: normal  bilateral, no tremors     Psychiatric: alert and oriented x3, calm     Review Of Systems  Skin: negative  Eyes: negative  Ears/Nose/Throat: negative  Respiratory: No shortness of breath, dyspnea on exertion, cough, or hemoptysis  Cardiovascular: negative  Gastrointestinal: negative  Genitourinary: negative  Musculoskeletal: negative  Neurologic: negative  Psychiatric: negative  Hematologic/Lymphatic/Immunologic: negative  Endocrine: negative          Lab Results    Chemistry/lipid CBC Cardiac Enzymes/BNP/TSH/INR   No results for input(s): CHOL, HDL, LDL, TRIG, CHOLHDLRATIO in the last 35241 hours.  No results for input(s): LDL in the last 91001 hours.  Recent Labs   Lab Test 08/09/21  0454   *   POTASSIUM 3.4*  3.4*   CHLORIDE 102   CO2 21*      BUN 19   CR 0.55*   GFRESTIMATED 82   ABIGAIL 8.6     Recent Labs   Lab Test 08/09/21  0454 08/08/21  1209 08/07/21  0856   CR 0.55* 0.56* 0.58*     No results for input(s): A1C in the last 63525 hours.       Recent Labs   Lab Test 08/09/21  0454   WBC 15.6*   HGB 9.4*   HCT 29.1*   MCV 78   *     Recent Labs   Lab Test 08/09/21  0454 08/08/21  0510 08/07/21  0947   HGB 9.4* 9.8* 10.4*    Recent Labs   Lab Test 08/07/21  0856   TROPONINI 0.07     No results for input(s): BNP, NTBNPI, NTBNP in the last 66691 hours.  Recent Labs   Lab Test 08/06/21  1019   TSH 1.41     No results for input(s): INR in the last 51606 hours.     Medical History  Surgical History Family History Social History   Past Medical  History:   Diagnosis Date     Breast cancer (H)     left, s/p lumpectomy     CAD (coronary artery disease)     MI 2010 s/p stent     Diastolic dysfunction      Diverticulosis      HLD (hyperlipidemia)      HTN (hypertension)      Hx SBO      Right ovarian cyst      STEMI (ST elevation myocardial infarction) (H) 10/28/2010    inferior     Urinary incontinence      Past Surgical History:   Procedure Laterality Date     ANGIOPLASTY  2010     ANKLE FRACTURE SURGERY Right 1995     APPENDECTOMY       APPENDECTOMY  1975     AS CORNEAL TRANSPLANT,LAMELLAR       CATARACT EXTRACTION Bilateral 2007     CHOLECYSTECTOMY       CHOLECYSTECTOMY  1975     CORNEAL TRANSPLANT Bilateral 2011     FRACTURE TX, ANKLE RT/LT       LAPAROSCOPIC SALPINGO-OOPHORECTOMY Bilateral 3/26/2021    Procedure: Laparoscopic BSO. Cystoscopy;  Surgeon: Juanjose Camejo MD;  Location: [facility] OR     LIGATE VEIN       LUMPECTOMY BREAST Left      LUMPECTOMY BREAST Left 03/10/2017     [facility] PATIENT HAS A CORONARY ARTERY STENT  2010     US BREAST BIOPSY LT       VEIN SURGERY Right 1980     Family History   Problem Relation Age of Onset     Breast Cancer Daughter 50.00     Breast Cancer Niece      Hypertension Mother      Uterine Cancer Mother      Prostate Cancer Father      Cancer Sister         small intestinal cancer.     No Known Problems Son         passed away in an accident.     No Known Problems Son      Heart Disease Daughter      Hyperlipidemia Daughter      Sjogren's Daughter      Hyperlipidemia Daughter         Social History     Socioeconomic History     Marital status: Single     Spouse name: Not on file     Number of children: Not on file     Years of education: Not on file     Highest education level: Not on file   Occupational History     Not on file   Tobacco Use     Smoking status: Never Smoker     Smokeless tobacco: Never Used   Substance and Sexual Activity     Alcohol use: Yes     Alcohol/week: 1.0 standard drinks     Types: 1 Cans of  beer per week     Comment: 3 times a year     Drug use: No     Sexual activity: Not Currently   Other Topics Concern     Not on file   Social History Narrative     Not on file     Social Determinants of Health     Financial Resource Strain:      Difficulty of Paying Living Expenses:    Food Insecurity:      Worried About Running Out of Food in the Last Year:      Ran Out of Food in the Last Year:    Transportation Needs:      Lack of Transportation (Medical):      Lack of Transportation (Non-Medical):    Physical Activity:      Days of Exercise per Week:      Minutes of Exercise per Session:    Stress:      Feeling of Stress :    Social Connections:      Frequency of Communication with Friends and Family:      Frequency of Social Gatherings with Friends and Family:      Attends [employer] Services:      Active Member of Clubs or Organizations:      Attends Club or Organization Meetings:      Marital Status:    Intimate Partner Violence:      Fear of Current or Ex-Partner:      Emotionally Abused:      Physically Abused:      Sexually Abused:          Medications  Allergies   No current outpatient medications on file.        Allergies   Allergen Reactions     Amlodipine Cough, Swelling and Other (See Comments)     PN: swollen tongue, irritated throat     Anastrozole Palpitations     High b/p, headache     Lisinopril Anaphylaxis, Shortness Of Breath and Other (See Comments)     Losartan Swelling     PN: severe leg swelling     Sulfa Drugs Swelling and Anaphylaxis     Throat swelling     Thiazide-Type Diuretics Other (See Comments)     Shakes and chills.  Shakes and chills.     Coffee Bean Extract  [Coffea Arabica]      Other reaction(s): Other (See Comments)  PN: decaff coffee     Furosemide Swelling     Hydrochlorothiazide Swelling     Metolazone Other (See Comments)     PN: Trouble breathing and tremors     Morphine GI Disturbance     No Clinical Screening - See Comments Other (See Comments)     PN: decaff coffee          Joanne Fall MD

## 2021-08-09 NOTE — PROGRESS NOTES
General Surgery Progress Note:    Hospital Day # 3    ASSESSMENT:   Pelvic abscess  Ileus resolving  Status post drain of abscess  Abscess may be from my uterus Dr. Chaparro is following also    Angelica Toro is a 89 year old female admitted with abscess.  Drainage been placed.  IV antibiotics have been also instituted she is slowly improving white count is improved today and she is passing gas and stool ready to eat and will start her on clear liquid diet    PLAN:     Drain   IV antibiotics  Start clears    SUBJECTIVE:   Angelica Toro is she is improving feeling better passing gas and pain is improved as is her white count    Patient Vitals for the past 24 hrs:   BP Temp Temp src Pulse Resp SpO2 Weight   08/09/21 0737 114/58 97.3  F (36.3  C) Oral 67 16 92 % --   08/09/21 0500 113/55 97.3  F (36.3  C) Oral 66 18 91 % 63.6 kg (140 lb 3.4 oz)   08/09/21 0004 117/58 97.3  F (36.3  C) Oral 74 20 93 % --   08/08/21 1918 120/56 98.1  F (36.7  C) Oral 72 17 92 % --   08/08/21 1632 117/54 98  F (36.7  C) Axillary 77 17 94 % --   08/08/21 1205 (!) 164/81 97.9  F (36.6  C) Oral 91 18 97 % --   08/08/21 1106 (!) 147/80 -- -- 96 -- -- --       Physical Exam:  General: NAD, pleasant  CV:RRR  LUNGS:CTA bilaterally  ABD: Soft tender lower abdomen GUANAKO drain is serous drainage output  EXT:no CCE    Admission on 08/05/2021   Component Date Value     Sodium 08/05/2021 124*     Potassium 08/05/2021 3.8      Chloride 08/05/2021 91*     Carbon Dioxide (CO2) 08/05/2021 25      Anion Gap 08/05/2021 8      Urea Nitrogen 08/05/2021 12      Creatinine 08/05/2021 0.59*     Calcium 08/05/2021 8.6      Glucose 08/05/2021 110      Alkaline Phosphatase 08/05/2021 126*     AST 08/05/2021 22      ALT 08/05/2021 26      Protein Total 08/05/2021 6.9      Albumin 08/05/2021 2.4*     Bilirubin Total 08/05/2021 0.6      GFR Estimate 08/05/2021 80      Color Urine 08/05/2021 Yellow      Appearance Urine 08/05/2021 Cloudy*     Glucose Urine 08/05/2021  Negative      Bilirubin Urine 08/05/2021 Negative      Ketones Urine 08/05/2021 Negative      Specific Gravity Urine 08/05/2021 1.015      Blood Urine 08/05/2021 0.5 mg/dL*     pH Urine 08/05/2021 6.5      Protein Albumin Urine 08/05/2021 10 *     Urobilinogen Urine 08/05/2021 2.0*     Nitrite Urine 08/05/2021 Positive*     Leukocyte Esterase Urine 08/05/2021 250 Lee Ann/uL*     Bacteria Urine 08/05/2021 Few*     Mucus Urine 08/05/2021 Present*     RBC Urine 08/05/2021 10*     WBC Urine 08/05/2021 25*     Squamous Epithelials Uri* 08/05/2021 1      WBC Count 08/05/2021 9.8      RBC Count 08/05/2021 4.20      Hemoglobin 08/05/2021 10.6*     Hematocrit 08/05/2021 32.5*     MCV 08/05/2021 77*     MCH 08/05/2021 25.2*     MCHC 08/05/2021 32.6      RDW 08/05/2021 13.5      Platelet Count 08/05/2021 503*     % Neutrophils 08/05/2021 80      % Lymphocytes 08/05/2021 10      % Monocytes 08/05/2021 9      % Eosinophils 08/05/2021 0      % Basophils 08/05/2021 0      % Immature Granulocytes 08/05/2021 1      NRBCs per 100 WBC 08/05/2021 0      Absolute Neutrophils 08/05/2021 7.8      Absolute Lymphocytes 08/05/2021 1.0      Absolute Monocytes 08/05/2021 0.9      Absolute Eosinophils 08/05/2021 0.0      Absolute Basophils 08/05/2021 0.0      Absolute Immature Granul* 08/05/2021 0.1*     Absolute NRBCs 08/05/2021 0.0      Culture 08/05/2021 >100,000 CFU/mL Escherichia coli*     SARS CoV2 PCR 08/05/2021 Negative      Lactic Acid 08/06/2021 0.7      Culture 08/06/2021 No growth after 2 days      GLUCOSE BY METER POCT 08/06/2021 130*     Hold Specimen 08/06/2021 JIC      Hold Specimen 08/06/2021 JIC      Hold Specimen 08/06/2021 JIC      Hold Specimen 08/06/2021 JIC      Lipase 08/06/2021 24      Potassium 08/06/2021 2.9*     Magnesium 08/06/2021 1.6*     Sodium 08/06/2021 127*     Potassium 08/06/2021 2.6*     Chloride 08/06/2021 95*     Carbon Dioxide (CO2) 08/06/2021 20*     Anion Gap 08/06/2021 12      Urea Nitrogen 08/06/2021 10       Creatinine 08/06/2021 0.57*     Calcium 08/06/2021 8.0*     Glucose 08/06/2021 117      GFR Estimate 08/06/2021 81      WBC Count 08/06/2021 17.4*     RBC Count 08/06/2021 3.98      Hemoglobin 08/06/2021 10.2*     Hematocrit 08/06/2021 30.7*     MCV 08/06/2021 77*     MCH 08/06/2021 25.6*     MCHC 08/06/2021 33.2      RDW 08/06/2021 13.7      Platelet Count 08/06/2021 459*     TSH 08/06/2021 1.41      Anti Xa Unfractionated H* 08/06/2021 <=0.10      Potassium 08/06/2021 5.0      Magnesium 08/07/2021 1.7*     Potassium 08/07/2021 4.5      Lactic Acid 08/07/2021 1.1      Troponin I 08/07/2021 0.07      GLUCOSE BY METER POCT 08/07/2021 152*     WBC Count 08/07/2021 17.8*     RBC Count 08/07/2021 4.07      Hemoglobin 08/07/2021 10.4*     Hematocrit 08/07/2021 31.5*     MCV 08/07/2021 77*     MCH 08/07/2021 25.6*     MCHC 08/07/2021 33.0      RDW 08/07/2021 14.1      Platelet Count 08/07/2021 503*     Creatinine 08/07/2021 0.58*     GFR Estimate 08/07/2021 81      Hold Specimen 08/07/2021 [facility]      Gram Stain Result 08/07/2021 No PMNs seen*     Gram Stain Result 08/07/2021 4+ Gram positive cocci in chains*     Gram Stain Result 08/07/2021 3+ Gram positive bacilli*     Culture 08/07/2021 4+ Gram negative bacilli*     Culture 08/07/2021 4+ Gram negative bacilli*     Magnesium 08/08/2021 1.9      Potassium 08/08/2021 3.4*     WBC Count 08/08/2021 18.4*     RBC Count 08/08/2021 3.82      Hemoglobin 08/08/2021 9.8*     Hematocrit 08/08/2021 29.6*     MCV 08/08/2021 78      MCH 08/08/2021 25.7*     MCHC 08/08/2021 33.1      RDW 08/08/2021 14.4      Platelet Count 08/08/2021 480*     Sodium 08/08/2021 130*     Potassium 08/08/2021 3.3*     Chloride 08/08/2021 100      Carbon Dioxide (CO2) 08/08/2021 20*     Anion Gap 08/08/2021 10      Urea Nitrogen 08/08/2021 14      Creatinine 08/08/2021 0.56*     Calcium 08/08/2021 8.8      Glucose 08/08/2021 129*     GFR Estimate 08/08/2021 82      Potassium 08/08/2021 3.7      WBC Count  08/09/2021 15.6*     RBC Count 08/09/2021 3.75*     Hemoglobin 08/09/2021 9.4*     Hematocrit 08/09/2021 29.1*     MCV 08/09/2021 78      MCH 08/09/2021 25.1*     MCHC 08/09/2021 32.3      RDW 08/09/2021 14.5      Platelet Count 08/09/2021 483*     Magnesium 08/09/2021 2.0      Potassium 08/09/2021 3.4*     GLUCOSE BY METER POCT 08/09/2021 102      Sodium 08/09/2021 129*     Potassium 08/09/2021 3.4*     Chloride 08/09/2021 102      Carbon Dioxide (CO2) 08/09/2021 21*     Anion Gap 08/09/2021 6      Urea Nitrogen 08/09/2021 19      Creatinine 08/09/2021 0.55*     Calcium 08/09/2021 8.6      Glucose 08/09/2021 123      GFR Estimate 08/09/2021 82         Vahid Gautam MD

## 2021-08-09 NOTE — PROGRESS NOTES
GYN NOTE    Comfortable  VSS  Abd: Soft,expected tenderness  WBC 17K  Pelvic abscess s/p drain placement  Continue antibiotics and drainage  Will review CT with radiology in am    Diony Chaparro MD

## 2021-08-09 NOTE — PROGRESS NOTES
Care Management Follow Up    Length of Stay (days): 3    Expected Discharge Date: 08/11/2021     Concerns to be Addressed:       Patient plan of care discussed at interdisciplinary rounds: Yes    Anticipated Discharge Disposition:  TCU     Anticipated Discharge Services:  TCU  Anticipated Discharge DME:  TBD    Patient/family educated on Medicare website which has current facility and service quality ratings:  Yes  Education Provided on the Discharge Plan:  Yes  Patient/Family in Agreement with the Plan:  Yes    Referrals Placed by CM/SW:  [facility], [facility]  Private pay costs discussed: Yes    Additional Information:    ALISE received confirmation that [facility] and [facility] could accept the Pt.  ALISE spoke with Pt's daughter, Krista to inform her.  Krista states that they would prefer [facility] over [facility] and she would like referrals to be sent to [facility] and [facility].  Krista notes that she would prefer a private room and requests that SWCM check with facilities if they could tour the facilities.  CM to follow up on referrals and choices from family.     3:26 PM  Krista requests that [facility] is taken off of TCU choices.   Krista requests referral sent to [place].  ALISE left a VM for Krista requesting a call back after she has been able to tour facilities tomorrow to make a decision.      YADI Echeverria

## 2021-08-09 NOTE — PROGRESS NOTES
"[facility] MEDICINE PROGRESS NOTE      Identification/Summary: Angelica Toro is a 89 year old female admitted on 8/5/2021. She has a history of bilateral ovarian masses, CAD, HTN, HLD, and chronic hyponatremia presents with fever and chills and admitted for acute cystitis. Her course was complicated by new-onset atrial fibrillation with RVR for which cardiology was consulted and amiodarone and heparin infusions started and they have now transitioned her to oral amiodarone and apixaban. Acute onset of ongoing abdominal pain, without rigidity or signs of peritonitis, and abnormal plain film abdominal imaging with wall swelling/inflammation, CT scan demonstrated acute perforated sigmoid diverticulitis with 2 pelvic abscesses. The largest measures 6.5 x 5.4 cm. She started on zosyn, underwent IR drained the abscess 8/7.  Nausea from yesterday has resolved.  She is slowly feeling better.  Surgery starting her on clear liquids today.      Assessment and Plan:    Sepsis secondary to:   Acute Perforated Sigmoid Diverticulitis with Abscesses  Pelvic Abscess  ?Uterine Abscess  CT scan on 8/6 demonstrated above acute findings, and general surgery and IR consulted, and subsequently IR placeed drain on 8/7  Gynecology recommended ongoing conservative medical management without surgery as tolerated, and recommended IR drain placement \"without regard to possible uterine origin\"  Short-term had broadened IV zosyn and vancomycin, discontinue vancomycin and continue with zosyn   Plan: Continue current course and appreciate input from GYN and general surgery        Atrial Fibrillation with RVR  Converted to NSR  Started on amiodarone, continue but now switch to oral  Continue Coreg and Diltiazem   IV heparin has been stopped and she was started on apixaban.  Has been followed by cardiology  Plan: Per cardiology      Hypokalemia  Replace per protocol       UTI  UC grew Ecoli  On Zosyn, continue      Chronic " Hyponatremia  Na was 128 in 3/10/2021, and most recently was 124 on 7/14/2021 a month ago  Na at admission is also 124 and unchanged from a month ago and baseline  Sodium 129 today.  Plan: Check sodium in the morning    Severe protein-calorie malnutrition with albumin of 2.4.  Plan: Encourage healthy eating     HTN  CAD  Order home medications.     HLD  Order home statin.     Dispo  PT, OT evals  CM/SW  Plan: Likely will need TCU but most likely here at least another couple days     Diet:  General surgery started clear liquids today  DVT Prophylaxis:  Apixaban  Ferrer Catheter: Not present  Central Lines: None  Code Status: No CPR- Do NOT Intubate       Disposition Plan   Expected discharge: 2 days recommended to TBD once adequate pain management/ tolerating PO medications, antibiotic plan established, safe disposition plan/ TCU bed available  The patient's care was discussed with the Care Coordinator/, daughter Loly at bedside and Patient.     Interval History/Subjective:  Patient reports she is feeling a little better.  Slept much better last night and she has recently.  Eager to start some clear liquids.  No nausea currently.  Daughter Loly is at bedside and agree with with above assessment and plan    Physical Exam/Objective:  Temp:  [97.3  F (36.3  C)-98.1  F (36.7  C)] 97.3  F (36.3  C)  Pulse:  [66-96] 67  Resp:  [16-20] 16  BP: (113-164)/(54-81) 114/58  SpO2:  [91 %-97 %] 92 %    Body mass index is 25.65 kg/m .    GENERAL:  Alert, appears comfortable, in no acute distress, appears stated age   HEAD:  Normocephalic, without obvious abnormality, atraumatic   LUNGS:   Clear to auscultation bilaterally, no rales, rhonchi, or wheezing, symmetric chest rise on inhalation, respirations unlabored   HEART:  Regular rate and rhythm, no murmur   ABDOMEN:   Soft, diffuse mild tenderness, no masses, no organomegaly, no rebound or guarding   EXTREMITIES: Extremities normal, atraumatic, no cyanosis or edema     SKIN: Dry to touch, no exanthems in the visualized areas   NEURO: Alert, appears grossly cognitively intact but daughter at bedside is doing most of the talking, moves all four extremities freely   PSYCH: Cooperative, behavior is appropriate      Medications:   Personally Reviewed.  Medications     dextrose 5% and 0.9% NaCl 50 mL/hr at 08/09/21 0500       acetaminophen  650 mg Oral Once     amiodarone  200 mg Oral BID     apixaban ANTICOAGULANT  2.5 mg Oral BID     aspirin  81 mg Oral Daily     carvedilol  6.25 mg Oral BID w/meals     diltiazem ER COATED BEADS  180 mg Oral Daily    And     diltiazem ER COATED BEADS  120 mg Oral Daily     piperacillin-tazobactam  3.375 g Intravenous Q8H     potassium chloride  20 mEq Oral or Feeding Tube Once     sodium chloride (PF)  10 mL Irrigation Q8H     sodium chloride (PF)  3 mL Intracatheter Q8H     valsartan  80 mg Oral QPM       Data reviewed today: I personally reviewed all new medications, labs, imaging/diagnostics reports over the past 24 hours. Pertinent findings include:    Labs:  Most Recent 3 CBC's:Recent Labs   Lab Test 08/09/21  0454 08/08/21  0510 08/07/21  0947   WBC 15.6* 18.4* 17.8*   HGB 9.4* 9.8* 10.4*   MCV 78 78 77*   * 480* 503*     Most Recent 3 BMP's:Recent Labs   Lab Test 08/09/21  0454 08/09/21  0004 08/08/21  1511 08/08/21  1209 08/07/21  0856 08/06/21  1019 08/06/21  0702   *  --   --  130*  --   --  127*   POTASSIUM 3.4*  3.4*  --  3.7 3.3*  --   --  2.6*   CHLORIDE 102  --   --  100  --   --  95*   CO2 21*  --   --  20*  --   --  20*   BUN 19  --   --  14  --   --  10   CR 0.55*  --   --  0.56* 0.58*  --  0.57*   ANIONGAP 6  --   --  10  --   --  12   ABIGAIL 8.6  --   --  8.8  --   --  8.0*    102  --  129*  --    < > 117    < > = values in this interval not displayed.       Diony Ledezma MD  [facility]  Phone: #[phone]

## 2021-08-09 NOTE — PLAN OF CARE
A&O, c/o weakness/ fatigue, no pain. Flat affect. GUANAKO drain w minimal tan/milky output. Up to bathroom. NPO. No c/o nausea. Abdomen distended.    Problem: Adult Inpatient Plan of Care  Goal: Optimal Comfort and Wellbeing  Outcome: No Change

## 2021-08-09 NOTE — PROGRESS NOTES
"  Interventional Radiology - Progress Note  Inpatient - [facility]: Interventional Radiology   [phone]  8/9/2021    S:  CHART CHECK    WBC improving. 15.6<18.4.      O:  /65 (BP Location: Right arm)   Pulse 61   Temp 97.6  F (36.4  C) (Oral)   Resp 16   Ht 1.575 m (5' 2\")   Wt 63.6 kg (140 lb 3.4 oz)   SpO2 93%   BMI 25.65 kg/m        IMAGING:  CT drain placement 8/7/21:  1.  Successful CT-guided drain placement into presumed uterine abscess. This 12 Telugu drain was modified with additional sideholes which was positioned within the loculated fluid in the pelvis to provide drainage of both fluid collections.    LABS:  CBC RESULTS: Recent Labs   Lab Test 08/09/21  0454   WBC 15.6*   RBC 3.75*   HGB 9.4*   HCT 29.1*   MCV 78   MCH 25.1*   MCHC 32.3   RDW 14.5   *       Drain Outputs (in mL):  8/7 90   8/8 20   8/9 5               A:  89 you female with sepsis secondary to acute perforated sigmoid diverticulitis with pelvic abscesses s/p CT guided 12F drain placement 8/7/21. WBC improving. Afebrile.    P:    - Continue present drain cares. Continue drain to GUANAKO drainage.  - Drain discharge instructions entered into D/C navigator.  - Patient to flush drain with 10mL NS daily after discharge. NS flushes ordered in D/C navigator.  - Recommend follow up CT and abscessogram approximately 7-10 days from drain placement date. Perhaps sooner if there are changes in drain function or in patient's clinical course.   - IR will continue to follow loosely. Please contact IR with drain related questions or concerns.    LEXA LeijaC  Interventional Radiology  [phone]    "

## 2021-08-10 NOTE — PROGRESS NOTES
"[facility] MEDICINE PROGRESS NOTE      Identification/Summary: Angelica Toro is a 89 year old female admitted on 8/5/2021. She has a history of bilateral ovarian masses, CAD, HTN, HLD, and chronic hyponatremia presents with fever and chills and admitted for acute cystitis. Her course was complicated by new-onset atrial fibrillation with RVR for which cardiology was consulted and amiodarone and heparin infusions started and they have now transitioned her to oral amiodarone and apixaban. Acute onset of ongoing abdominal pain, without rigidity or signs of peritonitis, and abnormal plain film abdominal imaging with wall swelling/inflammation, CT scan demonstrated acute perforated sigmoid diverticulitis with 2 pelvic abscesses. The largest measures 6.5 x 5.4 cm. She started on piperacillin tazobactam and underwent IR drained the abscess 8/7.  Nausea from yesterday has resolved.  She is slowly feeling better.  Surgery starting her on clear liquids on 8/9.  Repeat CT today indicates she needs an additional drain placed I discussed this with surgery and GYN and they are in agreement.  We will hold her apixaban tonight keep her n.p.o. and that will be done in the morning.        Assessment and Plan:     Sepsis secondary to:   Acute Perforated Sigmoid Diverticulitis with Abscesses  Pelvic Abscess  ?Uterine Abscess  CT scan on 8/6 demonstrated above acute findings, and general surgery and IR consulted, and subsequently IR placeed drain on 8/7  Gynecology recommended ongoing conservative medical management without surgery as tolerated, and recommended IR drain placement \"without regard to possible uterine origin\"  Short-term had broadened IV piperacillin-tazobactam and vancomycin, discontinue vancomycin and continue with piperacillin tazobactam.  Plan: New drain placement and residual fluid formation in the pelvis/abdomen in the morning.  I did discuss this with GYN and general surgery        Atrial " Fibrillation with RVR  Converted to NSR  Started on amiodarone, continue but now switch to oral  Continue Coreg and Diltiazem   IV heparin has been stopped and she was started on apixaban.  Has been followed by cardiology  Plan: Per cardiology and will hold apixaban tonight with drain placement in the morning.     Hypokalemia  Replace per protocol       UTI  UC grew Ecoli  On piperacillin-tazobactam, continue      Chronic Hyponatremia  Na was 128 in 3/10/2021, and most recently was 124 on 7/14/2021 a month ago  Na at admission is also 124 and unchanged from a month ago and baseline  Sodium 131 today.  Plan: Check sodium in the morning     Severe protein-calorie malnutrition with albumin of 2.4.  Plan: Encourage healthy eating     HTN  CAD  Order home medications.     HLD  Order home statin.     Dispo  PT, OT bk  CM/SW  Plan: Likely will need TCU but most likely here at least another couple days     Diet:  General surgery started clear liquids today  DVT Prophylaxis:  Apixaban  Ferrer Catheter: Not present  Central Lines: None  Code Status: No CPR- Do NOT Intubate       Disposition Plan   Expected discharge: 2 days recommended to TBD once adequate pain management/ tolerating PO medications, antibiotic plan established, safe disposition plan/ TCU bed available  The patient's care was discussed with the Care Coordinator/, daughter Loly at bedside and Patient.       Interval History/Subjective:  Daughter is at bedside and both patient and daughter feels she is improved today from yesterday.  She was eager to start to advance her diet and is been doing reasonably well with some clear liquids.  No nausea today.  No chest pain or shortness of breath.  Is in agreement with above plan.    Physical Exam/Objective:  Temp:  [97.6  F (36.4  C)-98.5  F (36.9  C)] 98.5  F (36.9  C)  Pulse:  [67-75] 69  Resp:  [18-20] 18  BP: (118-145)/(55-67) 145/66  SpO2:  [89 %-95 %] 93 %    Body mass index is 25.79  kg/m .    GENERAL:  Alert, appears comfortable, in no acute distress, appears stated age   LUNGS:   Clear to auscultation bilaterally   HEART:   Irregularly irregular with controlled rate and no murmur   ABDOMEN:   Soft, diffuse mild tenderness with mild distention, no masses, no organomegaly, no rebound or guarding   EXTREMITIES:  No ankle edema    SKIN: Dry to touch, no exanthems in the visualized areas   NEURO: Alert, appears grossly cognitively intact, moves all four extremities freely   PSYCH: Cooperative, behavior is appropriate      Medications:   Personally Reviewed.  Medications     0.9% sodium chloride + KCl 20 mEq/L         acetaminophen  650 mg Oral Once     amiodarone  200 mg Oral BID     [Held by provider] apixaban ANTICOAGULANT  2.5 mg Oral BID     aspirin  81 mg Oral Daily     carvedilol  6.25 mg Oral BID w/meals     diltiazem ER COATED BEADS  180 mg Oral Daily    And     diltiazem ER COATED BEADS  120 mg Oral Daily     piperacillin-tazobactam  3.375 g Intravenous Q8H     potassium chloride  20 mEq Oral TID     sodium chloride (PF)  10 mL Irrigation Q8H     sodium chloride (PF)  3 mL Intracatheter Q8H     valsartan  80 mg Oral QPM       Data reviewed today: I personally reviewed all new medications, labs, imaging/diagnostics reports over the past 24 hours. Pertinent findings include:    Imaging:   Recent Results (from the past 24 hour(s))   CT Abdomen Pelvis w Contrast    Addendum: 8/10/2021    Addendum: Small bowel ileus or partial obstruction. Ileus is likely.      Narrative    EXAM: CT ABDOMEN PELVIS W CONTRAST  LOCATION: [facility]  DATE/TIME: 8/10/2021 12:14 PM    INDICATION: Abdominal abscess/infection suspected  COMPARISON: Drain placement 8/7/2021. CT 8/6/2021.   TECHNIQUE: CT scan of the abdomen and pelvis was performed following injection of IV contrast. Multiplanar reformats were obtained. Dose reduction techniques were used.  CONTRAST: 100ml Isovue  370    FINDINGS:   LOWER CHEST: Small pleural effusions have increased in size. There is associated atelectasis. There is coronary artery disease. There is aortic soft plaque and calcification.     HEPATOBILIARY: Cholecystectomy. Circumscribed low-attenuation hepatic lesions have not changed.     PANCREAS: Normal.    SPLEEN: Normal.    ADRENAL GLANDS: Normal.    KIDNEYS/BLADDER: Simple renal cysts do not require follow-up.     BOWEL: Nondistended stomach. Multiple loops of small bowel are distended with air-fluid levels, with a maximum diameter of 3.6 cm. More distal small bowel bowel loops are normal in caliber. There is colonic diverticulosis.     LYMPH NODES: Multiple enlarged and prominent mesenteric and retroperitoneal lymph nodes may be reactive.     VASCULATURE: Atherosclerotic disease. The aorta is 3.2 cm in maximum transverse diameter.     PELVIC ORGANS: Fibroid uterus.    OTHER: A right pelvic drain is present; the previously identified uterine fluid collection has resolved. Intraperitoneal free fluid with peripheral enhancement and air locules has increased in volume from the prior study.     MUSCULOSKELETAL: Normal.      Impression    IMPRESSION:   1.  Intraperitoneal free fluid with peripheral enhancement and internal air locules has increased in volume from the prior study. Additional drain placement should be considered.  2.  The previously drained uterine collection is no longer present.       Labs:  Most Recent 3 CBC's:Recent Labs   Lab Test 08/10/21  0550 08/09/21  0454 08/08/21  0510   WBC 11.5* 15.6* 18.4*   HGB 9.3* 9.4* 9.8*   MCV 76* 78 78   * 483* 480*     Most Recent 3 BMP's:Recent Labs   Lab Test 08/10/21  1706 08/10/21  0550 08/09/21  1511 08/09/21  0454 08/09/21  0004 08/08/21  1209   NA  --  131*  --  129*  --  130*   POTASSIUM 3.0* 3.3* 3.7 3.4*  3.4*  --  3.3*   CHLORIDE  --  103  --  102  --  100   CO2  --  23  --  21*  --  20*   BUN  --  15  --  19  --  14   CR  --  0.50*  --   0.55*  --  0.56*   ANIONGAP  --  5  --  6  --  10   ABIGAIL  --  8.2*  --  8.6  --  8.8   GLC  --  125  --  123 102 129*       Diony Ledezma MD  [facility]  Phone: #[phone]

## 2021-08-10 NOTE — PROGRESS NOTES
Social work care manager notified writer of potential need for IV antibiotics at discharge. Referral sent to [facility] for review.    Arianne Blackman RN Care Manager.

## 2021-08-10 NOTE — PLAN OF CARE
Problem: Adult Inpatient Plan of Care  Goal: Optimal Comfort and Wellbeing  Outcome: Improving     Problem: UTI (Urinary Tract Infection)  Goal: Improved Infection Symptoms  Outcome: Improving     Problem: Risk for Delirium  Goal: Improved Sleep  Outcome: Improving     Resting comfortably overnight with no c/o pain. NSR on telemetry. Lung sounds clear/diminished, SpO2 = 90% on room air. Receiving zosyn, D5NS infusing at 50mL/hr. GUANAKO drain patent & intact.

## 2021-08-10 NOTE — PROGRESS NOTES
General Surgery Progress Note:    Hospital Day # 4    ASSESSMENT:   1. Acute cystitis without hematuria    2. Chills        Angelica Toro is a 89 year old female with 2 pelvic abscess s/p IR drain placement on 8/7.  Abscesses 1 uterus intrauterine and the other abscess felt to be from uterus and and not from sigmoid diverticulitis.  -Ileus improving  Leukocytosis improving  CT reviewed. Abscess larger and will need drain placement into other abscess. Will place npo after midnight.     PLAN:   -Continue IV antibiotics  -CT with contrast ordered to reevaluate abscess  -Patient still quite distended and no bowel movements but passing some gas.  Will check CT and most likely she can advance diet as tolerated.  -If abscesses are improving most likely she can be discharged when medically appropriate to TCU and follow-up with OB/GYN  -OB/GYN input appreciated      SUBJECTIVE:   Angelica Toro is resting currently comfortably in her bed.  She has no complaints.  Denies any significant abdominal pain.  No significant events overnight.  White count is trending down.  No fevers.    Patient Vitals for the past 24 hrs:   BP Temp Temp src Pulse Resp SpO2 Weight   08/10/21 0735 (!) 141/67 98.5  F (36.9  C) Oral 72 18 94 % --   08/10/21 0400 124/60 98.5  F (36.9  C) Oral 75 20 95 % 64 kg (141 lb)   08/10/21 0000 -- -- -- -- -- 90 % --   08/09/21 2358 131/60 97.6  F (36.4  C) Oral 67 20 (!) 89 % --   08/09/21 2002 118/55 98.1  F (36.7  C) Oral 67 18 90 % --   08/09/21 1604 110/55 98  F (36.7  C) Oral 69 20 91 % --   08/09/21 1100 119/65 97.6  F (36.4  C) Oral 61 16 93 % --       Physical Exam:  General: NAD, pleasant    ABD: Soft distended and slightly tender right lower quadrant.  No rebound or guarding.  EXT:no CCE    Admission on 08/05/2021   Component Date Value     Sodium 08/05/2021 124*     Potassium 08/05/2021 3.8      Chloride 08/05/2021 91*     Carbon Dioxide (CO2) 08/05/2021 25      Anion Gap 08/05/2021 8      Urea Nitrogen  08/05/2021 12      Creatinine 08/05/2021 0.59*     Calcium 08/05/2021 8.6      Glucose 08/05/2021 110      Alkaline Phosphatase 08/05/2021 126*     AST 08/05/2021 22      ALT 08/05/2021 26      Protein Total 08/05/2021 6.9      Albumin 08/05/2021 2.4*     Bilirubin Total 08/05/2021 0.6      GFR Estimate 08/05/2021 80      Color Urine 08/05/2021 Yellow      Appearance Urine 08/05/2021 Cloudy*     Glucose Urine 08/05/2021 Negative      Bilirubin Urine 08/05/2021 Negative      Ketones Urine 08/05/2021 Negative      Specific Gravity Urine 08/05/2021 1.015      Blood Urine 08/05/2021 0.5 mg/dL*     pH Urine 08/05/2021 6.5      Protein Albumin Urine 08/05/2021 10 *     Urobilinogen Urine 08/05/2021 2.0*     Nitrite Urine 08/05/2021 Positive*     Leukocyte Esterase Urine 08/05/2021 250 Lee [place]/uL*     Bacteria Urine 08/05/2021 Few*     Mucus Urine 08/05/2021 Present*     RBC Urine 08/05/2021 10*     WBC Urine 08/05/2021 25*     Squamous Epithelials Uri* 08/05/2021 1      WBC Count 08/05/2021 9.8      RBC Count 08/05/2021 4.20      Hemoglobin 08/05/2021 10.6*     Hematocrit 08/05/2021 32.5*     MCV 08/05/2021 77*     MCH 08/05/2021 25.2*     MCHC 08/05/2021 32.6      RDW 08/05/2021 13.5      Platelet Count 08/05/2021 503*     % Neutrophils 08/05/2021 80      % Lymphocytes 08/05/2021 10      % Monocytes 08/05/2021 9      % Eosinophils 08/05/2021 0      % Basophils 08/05/2021 0      % Immature Granulocytes 08/05/2021 1      NRBCs per 100 WBC 08/05/2021 0      Absolute Neutrophils 08/05/2021 7.8      Absolute Lymphocytes 08/05/2021 1.0      Absolute Monocytes 08/05/2021 0.9      Absolute Eosinophils 08/05/2021 0.0      Absolute Basophils 08/05/2021 0.0      Absolute Immature Granul* 08/05/2021 0.1*     Absolute NRBCs 08/05/2021 0.0      Culture 08/05/2021 >100,000 CFU/mL Escherichia coli*     SARS CoV2 PCR 08/05/2021 Negative      Lactic Acid 08/06/2021 0.7      Culture 08/06/2021 No growth after 3 days      GLUCOSE BY METER POCT  08/06/2021 130*     Hold Specimen 08/06/2021 JIC      Hold Specimen 08/06/2021 JIC      Hold Specimen 08/06/2021 JIC      Hold Specimen 08/06/2021 JIC      Lipase 08/06/2021 24      Potassium 08/06/2021 2.9*     Magnesium 08/06/2021 1.6*     Sodium 08/06/2021 127*     Potassium 08/06/2021 2.6*     Chloride 08/06/2021 95*     Carbon Dioxide (CO2) 08/06/2021 20*     Anion Gap 08/06/2021 12      Urea Nitrogen 08/06/2021 10      Creatinine 08/06/2021 0.57*     Calcium 08/06/2021 8.0*     Glucose 08/06/2021 117      GFR Estimate 08/06/2021 81      WBC Count 08/06/2021 17.4*     RBC Count 08/06/2021 3.98      Hemoglobin 08/06/2021 10.2*     Hematocrit 08/06/2021 30.7*     MCV 08/06/2021 77*     MCH 08/06/2021 25.6*     MCHC 08/06/2021 33.2      RDW 08/06/2021 13.7      Platelet Count 08/06/2021 459*     TSH 08/06/2021 1.41      Anti Xa Unfractionated H* 08/06/2021 <=0.10      Potassium 08/06/2021 5.0      Magnesium 08/07/2021 1.7*     Potassium 08/07/2021 4.5      Lactic Acid 08/07/2021 1.1      Troponin I 08/07/2021 0.07      GLUCOSE BY METER POCT 08/07/2021 152*     WBC Count 08/07/2021 17.8*     RBC Count 08/07/2021 4.07      Hemoglobin 08/07/2021 10.4*     Hematocrit 08/07/2021 31.5*     MCV 08/07/2021 77*     MCH 08/07/2021 25.6*     MCHC 08/07/2021 33.0      RDW 08/07/2021 14.1      Platelet Count 08/07/2021 503*     Creatinine 08/07/2021 0.58*     GFR Estimate 08/07/2021 81      Hold Specimen 08/07/2021 [facility]      Gram Stain Result 08/07/2021 No PMNs seen*     Gram Stain Result 08/07/2021 4+ Gram positive cocci in chains*     Gram Stain Result 08/07/2021 3+ Gram positive bacilli*     Culture 08/07/2021 Normal Marga with      Culture 08/07/2021 4+ Proteus mirabilis*     Magnesium 08/08/2021 1.9      Potassium 08/08/2021 3.4*     WBC Count 08/08/2021 18.4*     RBC Count 08/08/2021 3.82      Hemoglobin 08/08/2021 9.8*     Hematocrit 08/08/2021 29.6*     MCV 08/08/2021 78      MCH 08/08/2021 25.7*     MCHC 08/08/2021  33.1      RDW 08/08/2021 14.4      Platelet Count 08/08/2021 480*     Sodium 08/08/2021 130*     Potassium 08/08/2021 3.3*     Chloride 08/08/2021 100      Carbon Dioxide (CO2) 08/08/2021 20*     Anion Gap 08/08/2021 10      Urea Nitrogen 08/08/2021 14      Creatinine 08/08/2021 0.56*     Calcium 08/08/2021 8.8      Glucose 08/08/2021 129*     GFR Estimate 08/08/2021 82      Potassium 08/08/2021 3.7      WBC Count 08/09/2021 15.6*     RBC Count 08/09/2021 3.75*     Hemoglobin 08/09/2021 9.4*     Hematocrit 08/09/2021 29.1*     MCV 08/09/2021 78      MCH 08/09/2021 25.1*     MCHC 08/09/2021 32.3      RDW 08/09/2021 14.5      Platelet Count 08/09/2021 483*     Magnesium 08/09/2021 2.0      Potassium 08/09/2021 3.4*     GLUCOSE BY METER POCT 08/09/2021 102      Sodium 08/09/2021 129*     Potassium 08/09/2021 3.4*     Chloride 08/09/2021 102      Carbon Dioxide (CO2) 08/09/2021 21*     Anion Gap 08/09/2021 6      Urea Nitrogen 08/09/2021 19      Creatinine 08/09/2021 0.55*     Calcium 08/09/2021 8.6      Glucose 08/09/2021 123      GFR Estimate 08/09/2021 82      Potassium 08/09/2021 3.7      WBC Count 08/10/2021 11.5*     RBC Count 08/10/2021 3.74*     Hemoglobin 08/10/2021 9.3*     Hematocrit 08/10/2021 28.4*     MCV 08/10/2021 76*     MCH 08/10/2021 24.9*     MCHC 08/10/2021 32.7      RDW 08/10/2021 14.6      Platelet Count 08/10/2021 480*     Magnesium 08/10/2021 1.8      Sodium 08/10/2021 131*     Potassium 08/10/2021 3.3*     Chloride 08/10/2021 103      Carbon Dioxide (CO2) 08/10/2021 23      Anion Gap 08/10/2021 5      Urea Nitrogen 08/10/2021 15      Creatinine 08/10/2021 0.50*     Calcium 08/10/2021 8.2*     Glucose 08/10/2021 125      GFR Estimate 08/10/2021 85         LEXA MartinC

## 2021-08-10 NOTE — PROGRESS NOTES
Care Management Follow Up    Length of Stay (days): 4    Expected Discharge Date: 08/11/2021     Concerns to be Addressed:       Patient plan of care discussed at interdisciplinary rounds: Yes    Anticipated Discharge Disposition:  [facility]     Anticipated Discharge Services:  TCU  Anticipated Discharge DME:  TBD    Patient/family educated on Medicare website which has current facility and service quality ratings:  Yes  Education Provided on the Discharge Plan:  Yes  Patient/Family in Agreement with the Plan:  Yes    Referrals Placed by CM/SW:  TCU  Private pay costs discussed:Yes    Additional Information:    SWCM met with Pt's family to discuss TCU choices.  Family states that they would like to select [facility].  SWCM contacted TCU facilities to notify of decision.  CM to follow medical progression.  Family to transport upon readiness for discharge.       YADI Echeverria

## 2021-08-10 NOTE — PROGRESS NOTES
"  Interventional Radiology - Progress Note  Inpatient - [facility]: Interventional Radiology   [phone]  8/10/2021    S:  CHART CHECK    WBC improving. 11.5<15.6<18.4.    CT scan completed today      O:  BP (!) 145/66 (BP Location: Right arm)   Pulse 69   Temp 98.5  F (36.9  C) (Oral)   Resp 18   Ht 1.575 m (5' 2\")   Wt 64 kg (141 lb)   SpO2 93%   BMI 25.79 kg/m        IMAGING:  CT drain placement 8/7/21:  1.  Successful CT-guided drain placement into presumed uterine abscess. This 12 [place]ese drain was modified with additional sideholes which was positioned within the loculated fluid in the pelvis to provide drainage of both fluid collections.    LABS:  CBC RESULTS: Recent Labs   Lab Test 08/10/21  0550   WBC 11.5*   RBC 3.74*   HGB 9.3*   HCT 28.4*   MCV 76*   MCH 24.9*   MCHC 32.7   RDW 14.6   *       Drain Outputs (in mL):  8/7 90   8/8 20   8/9 25   8/10 10           A:  89 you female with sepsis secondary to acute perforated sigmoid diverticulitis with pelvic abscesses s/p CT guided 12F drain placement 8/7/21. WBC improving. Afebrile.    P:    - CT completed today. Discussed with Dr. Khan. Prior collection with drain in place greatly improved. Peritoneal fluid concerning for infection. This was relayed to surgery.  - Consider additional drainage pending surgery recommendations.  - Continue prior drain cares.    LEXA LeijaC  Interventional Radiology  [phone]    "

## 2021-08-10 NOTE — PLAN OF CARE
Patient requires asisstance of 2 staff for all safe transfers onto and off of the commode. GUANAKO Drain is patent with small amount of serosanguous liquid. Appetite is fair on a Clear Liquid Diet. IV Potassium replacement completed and awaiting recheck.CT of Abdomen and pelvis shows the need for further drains. Patient will be NPO after midnight for another Drain Placement tomorrow. Patient's hair was washed, but she declined a bed bath as she was too tired. Will continue to monitor.

## 2021-08-10 NOTE — PROGRESS NOTES
CLINICAL NUTRITION SERVICES - REASSESSMENT NOTE      EVALUATION OF THE PROGRESS TOWARD GOALS   Diet: CL   Nutrition Supplement/Support:  Ensure Clear bid   Intake: 25%        ANTHROPOMETRICS  Admission wt: 58kg  Most Recent Weight: 63kg      LABS  Reviewed; Na-131, K-3.3, Cr-.50, Ca-8.2    MEDICATIONS  Reviewed      NUTRITION DIAGNOSIS  Altered GI function related to perforated diverticulitis as evidenced by need for NPO and drain placement      Goals  Diet advancement vs nutrition support within 2-3 days.-progressing but only on CL       INTERVENTIONS  Implementation  Pt remains on CL if unable to advance diet in the next few days may need nutrition support       Monitoring/Evaluation  Progress toward goals will be monitored and evaluated per protocol.

## 2021-08-11 NOTE — PROGRESS NOTES
"[facility] MEDICINE PROGRESS NOTE      Identification/Summary: Angelica Toro is a 89 year old female admitted on 8/5/2021. She has a history of bilateral ovarian masses, CAD, HTN, HLD, and chronic hyponatremia presents with fever and chills and admitted for acute cystitis. Her course was complicated by new-onset atrial fibrillation with RVR for which cardiology was consulted and amiodarone and heparin infusions started and they have now transitioned her to oral amiodarone and apixaban. Acute onset of ongoing abdominal pain, without rigidity or signs of peritonitis, and abnormal plain film abdominal imaging with wall swelling/inflammation, CT scan demonstrated acute perforated sigmoid diverticulitis with 2 pelvic abscesses. The largest measures 6.5 x 5.4 cm. She started on piperacillin tazobactam and underwent IR drained the abscess 8/7.  Nausea resolved on 8/9.  She is slowly feeling better.  Surgery starting her on clear liquids on 8/9.  Repeat CT on 8/10 indicates she needs an additional drain placed I discussed this with surgery and GYN and they are in agreement this was performed today without difficulty.  Sent for cultures.  Restarted her apixaban today and will advance her to full liquids.  She is feeling hungry and eager to advance diet and overall feels quite a bit better.  General surgery is signing off because they feel this pelvic/abdominal abscess is more likely a GYN issue.        Assessment and Plan:     Sepsis secondary to:   Acute Perforated Sigmoid Diverticulitis with Abscesses  Pelvic Abscess  ?Uterine Abscess  CT scan on 8/6 demonstrated above acute findings, and general surgery and IR consulted, and subsequently IR placeed drain on 8/7  Gynecology recommended ongoing conservative medical management without surgery as tolerated, and recommended IR drain placement \"without regard to possible uterine origin\"  Short-term had broadened IV piperacillin-tazobactam and " vancomycin, discontinue vancomycin and continue with piperacillin tazobactam.  New drain placement and residual fluid formation in the pelvis/abdomen done today.  I discussed with general surgery today.   Plan: Advance diet to full liquids        Atrial Fibrillation with RVR  Converted to NSR  Started on amiodarone, continue but now switch to oral  Continue Coreg and Diltiazem   IV heparin has been stopped and she was started on apixaban.  Has been followed by cardiology  Plan: Per cardiology and restarted apixaban today.     Hypokalemia  Replace per protocol       UTI  UC grew Ecoli  On piperacillin-tazobactam, continue      Chronic Hyponatremia  Na was 128 in 3/10/2021, and most recently was 124 on 7/14/2021 a month ago  Na at admission is also 124 and unchanged from a month ago and baseline  Sodium 134 today.  Plan: Check sodium in the morning     Severe protein-calorie malnutrition with albumin of 2.4.  Plan: Encourage healthy eating and Ensure clear supplements between meals     HTN  CAD  Order home medications.     HLD  Order home statin.     Dispo  PT, OT evals  CM/SW  Plan: Likely will need TCU but most likely here at least another couple days     Diet:  General surgery started clear liquids today  DVT Prophylaxis:  Apixaban  Ferrer Catheter: Not present  Central Lines: None  Code Status: No CPR- Do NOT Intubate       Disposition Plan   Expected discharge: 2 days recommended to TBD once adequate pain management/ tolerating PO medications, antibiotic plan established, safe disposition plan/ TCU bed available  The patient's care was discussed with the Care Coordinator/, daughter Loly at bedside and Patient.    Interval History/Subjective:  Patient surprised how much easier this drain placement was that she can stay on her back.  Denies any nausea.  No chest pain or shortness of breath or lightheadedness.  Eager to advance diet.  Overall feeling better.    Physical Exam/Objective:  Temp:  [97.7  F  (36.5  C)-98.7  F (37.1  C)] 98.7  F (37.1  C)  Pulse:  [68-81] 74  Resp:  [12-22] 18  BP: (121-170)/(59-82) 126/60  SpO2:  [91 %-98 %] 93 %    Body mass index is 26.41 kg/m .    GENERAL:  Alert, appears comfortable, in no acute distress, appears stated age   HEAD:  Normocephalic, without obvious abnormality, atraumatic   LUNGS:   Clear to auscultation bilaterally, no rales, rhonchi, or wheezing, symmetric chest rise on inhalation, respirations unlabored   HEART:   Irregularly irregular with rate of 72   ABDOMEN:   Soft, non-tender to light touch   EXTREMITIES: Extremities normal, atraumatic, no cyanosis or edema    SKIN: Dry to touch, no exanthems in the visualized areas   NEURO: Alert, and appears grossly cognitively intact, moves all four extremities freely   PSYCH: Cooperative, behavior is appropriate      Medications:   Personally Reviewed.  Medications       acetaminophen  650 mg Oral Once     amiodarone  200 mg Oral BID     apixaban ANTICOAGULANT  2.5 mg Oral BID     aspirin  81 mg Oral Daily     carvedilol  6.25 mg Oral BID w/meals     diltiazem ER COATED BEADS  180 mg Oral Daily    And     diltiazem ER COATED BEADS  120 mg Oral Daily     piperacillin-tazobactam  3.375 g Intravenous Q8H     potassium chloride  20 mEq Oral TID     sodium chloride (PF)  10 mL Irrigation Q8H     sodium chloride (PF)  3 mL Intracatheter Q8H     valsartan  80 mg Oral QPM       Data reviewed today: I personally reviewed all new medications, labs, imaging/diagnostics reports over the past 24 hours. Pertinent findings include:    Imaging:   Recent Results (from the past 24 hour(s))   CT Abdomen Peritoneum Abscess Aspiration    Narrative    EXAM:  1. PERCUTANEOUS DRAIN PLACEMENT RIGHT LOWER QUADRANT  2. CT GUIDANCE  3. CONSCIOUS SEDATION  LOCATION: [facility]  DATE/TIME: 8/11/2021 10:49 AM    INDICATION: Patient with known intra-abdominal/pelvic abscess with recent CT-guided drain placement and now on repeat  CT today radiologist recommended additional drain placement.  TECHNIQUE: Dose reduction techniques were used.    PROCEDURE: Informed consent obtained. Site marked. Prior images reviewed. Required items made available. Patient identity confirmed verbally and with arm band. Patient reevaluated immediately before administering sedation. Universal protocol was   followed. Time out performed. The site was prepped and draped in sterile fashion. 10 mL of 1% lidocaine was infused into the local soft tissues. Using standard technique and under direct CT guidance, a 12 [place] drainage catheter was inserted into the   fluid collection.    The tip of the drainage catheter was placed in the most dependent portion of the fluid collection which, in a predominantly supine patient, was the posterior aspect.    SPECIMEN: 10 mL of light yellow/red fluid was aspirated and sent to lab for cultures and Gram stain.    BLOOD LOSS: Minimal.    The patient tolerated the procedure well. No immediate complications.    SEDATION: Versed 0.5 mg. Fentanyl 25 mcg. The procedure was performed with administration intravenous conscious sedation with appropriate preoperative, intraoperative, and postoperative evaluation.    15 minutes of supervised face to face conscious sedation time was provided by a radiology nurse under my direct supervision.      Impression    IMPRESSION:  1.  CT-guided drain placement into right lower quadrant abdominal abscess.    Reference CPT Codes: 24697, 42765       Labs:  Most Recent 3 CBC's:Recent Labs   Lab Test 08/11/21  0514 08/10/21  0550 08/09/21  0454   WBC 10.8 11.5* 15.6*   HGB 9.5* 9.3* 9.4*   MCV 77* 76* 78   * 480* 483*     Most Recent 3 BMP's:Recent Labs   Lab Test 08/11/21  0514 08/10/21  2346 08/10/21  1706 08/10/21  0550 08/09/21  0454   *  --   --  131* 129*   POTASSIUM 3.2* 3.1* 3.0* 3.3* 3.4*  3.4*   CHLORIDE 105  --   --  103 102   CO2 23  --   --  23 21*   BUN 9  --   --  15 19   CR 0.46*   --   --  0.50* 0.55*   ANIONGAP 6  --   --  5 6   ABIGAIL 7.7*  --   --  8.2* 8.6     --   --  125 123       Diony Ledezma MD  [facility]  Phone: #[phone]

## 2021-08-11 NOTE — PLAN OF CARE
Problem: Adult Inpatient Plan of Care  Goal: Plan of Care Review  Outcome: Improving     Problem: Hypertension Acute  Goal: Blood Pressure Within Desired Range  Outcome: Improving     Problem: Risk for Delirium  Goal: Improved Sleep  Outcome: Improving     Patient alert and oriented x 4. Denies pain this shift. Turned and repositioned as needed for comfort. Made NPO at midnight in anticipation of drain placement. Call light within reach, able to make needs known. Will continue to monitor.

## 2021-08-11 NOTE — PROGRESS NOTES
General Surgery Progress Note:    Hospital Day # 5    ASSESSMENT:   1. Acute cystitis without hematuria    2. Chills        Angelica Toro is a 89 year old female with 2 pelvic abscess s/p IR drain placement on 8/7.  Abscesses 1 uterus intrauterine and the other abscess felt to be from uterus and and not from sigmoid diverticulitis.  -Ileus improving  Leukocytosis resolved     PLAN:   -CT drain placement today into second abscess.    -Regular diet as tolerated after procedure  -This is not considered to be from sigmoid and ok from our standpoint to be followed by ob/gyn and Medicine team. We will sign off at this time. Please call us if any questions or concerns.   -TCU and follow up with ob/gyn    SUBJECTIVE:   Angelica Toro is resting in bed. No complaints. No significant events overnight. Having bm. No nausea. Hungry. Main complaint is weakness.     Patient Vitals for the past 24 hrs:   BP Temp Temp src Pulse Resp SpO2 Weight   08/11/21 0929 (!) 170/78 -- -- 77 -- -- --   08/11/21 0741 (!) 160/72 97.7  F (36.5  C) Oral 76 20 93 % --   08/11/21 0446 (!) 153/70 98.2  F (36.8  C) Oral 74 20 93 % 65.5 kg (144 lb 6.4 oz)   08/10/21 2347 (!) 163/67 98  F (36.7  C) Oral 74 22 92 % --   08/10/21 1943 (!) 151/68 98.1  F (36.7  C) Oral 74 16 93 % --   08/10/21 1730 121/59 98.3  F (36.8  C) Oral 73 16 93 % --   08/10/21 1232 (!) 145/66 98.5  F (36.9  C) Oral 69 18 93 % --       Physical Exam:  General: NAD, pleasant    ABD: mild distention. Generalized tenderness. No rebound. GUANAKO serous.   EXT:no CCE    Admission on 08/05/2021   Component Date Value     Sodium 08/05/2021 124*     Potassium 08/05/2021 3.8      Chloride 08/05/2021 91*     Carbon Dioxide (CO2) 08/05/2021 25      Anion Gap 08/05/2021 8      Urea Nitrogen 08/05/2021 12      Creatinine 08/05/2021 0.59*     Calcium 08/05/2021 8.6      Glucose 08/05/2021 110      Alkaline Phosphatase 08/05/2021 126*     AST 08/05/2021 22      ALT 08/05/2021 26      Protein Total  08/05/2021 6.9      Albumin 08/05/2021 2.4*     Bilirubin Total 08/05/2021 0.6      GFR Estimate 08/05/2021 80      Color Urine 08/05/2021 Yellow      Appearance Urine 08/05/2021 Cloudy*     Glucose Urine 08/05/2021 Negative      Bilirubin Urine 08/05/2021 Negative      Ketones Urine 08/05/2021 Negative      Specific Gravity Urine 08/05/2021 1.015      Blood Urine 08/05/2021 0.5 mg/dL*     pH Urine 08/05/2021 6.5      Protein Albumin Urine 08/05/2021 10 *     Urobilinogen Urine 08/05/2021 2.0*     Nitrite Urine 08/05/2021 Positive*     Leukocyte Esterase Urine 08/05/2021 250 Lee Ann/uL*     Bacteria Urine 08/05/2021 Few*     Mucus Urine 08/05/2021 Present*     RBC Urine 08/05/2021 10*     WBC Urine 08/05/2021 25*     Squamous Epithelials Uri* 08/05/2021 1      WBC Count 08/05/2021 9.8      RBC Count 08/05/2021 4.20      Hemoglobin 08/05/2021 10.6*     Hematocrit 08/05/2021 32.5*     MCV 08/05/2021 77*     MCH 08/05/2021 25.2*     MCHC 08/05/2021 32.6      RDW 08/05/2021 13.5      Platelet Count 08/05/2021 503*     % Neutrophils 08/05/2021 80      % Lymphocytes 08/05/2021 10      % Monocytes 08/05/2021 9      % Eosinophils 08/05/2021 0      % Basophils 08/05/2021 0      % Immature Granulocytes 08/05/2021 1      NRBCs per 100 WBC 08/05/2021 0      Absolute Neutrophils 08/05/2021 7.8      Absolute Lymphocytes 08/05/2021 1.0      Absolute Monocytes 08/05/2021 0.9      Absolute Eosinophils 08/05/2021 0.0      Absolute Basophils 08/05/2021 0.0      Absolute Immature Granul* 08/05/2021 0.1*     Absolute NRBCs 08/05/2021 0.0      Culture 08/05/2021 >100,000 CFU/mL Escherichia coli*     SARS CoV2 PCR 08/05/2021 Negative      Lactic Acid 08/06/2021 0.7      Culture 08/06/2021 No growth after 4 days      GLUCOSE BY METER POCT 08/06/2021 130*     Hold Specimen 08/06/2021 JIC      Hold Specimen 08/06/2021 JIC      Hold Specimen 08/06/2021 JIC      Hold Specimen 08/06/2021 JIC      Lipase 08/06/2021 24      Potassium 08/06/2021 2.9*      Magnesium 08/06/2021 1.6*     Sodium 08/06/2021 127*     Potassium 08/06/2021 2.6*     Chloride 08/06/2021 95*     Carbon Dioxide (CO2) 08/06/2021 20*     Anion Gap 08/06/2021 12      Urea Nitrogen 08/06/2021 10      Creatinine 08/06/2021 0.57*     Calcium 08/06/2021 8.0*     Glucose 08/06/2021 117      GFR Estimate 08/06/2021 81      WBC Count 08/06/2021 17.4*     RBC Count 08/06/2021 3.98      Hemoglobin 08/06/2021 10.2*     Hematocrit 08/06/2021 30.7*     MCV 08/06/2021 77*     MCH 08/06/2021 25.6*     MCHC 08/06/2021 33.2      RDW 08/06/2021 13.7      Platelet Count 08/06/2021 459*     TSH 08/06/2021 1.41      Anti Xa Unfractionated H* 08/06/2021 <=0.10      Potassium 08/06/2021 5.0      Magnesium 08/07/2021 1.7*     Potassium 08/07/2021 4.5      Lactic Acid 08/07/2021 1.1      Troponin I 08/07/2021 0.07      GLUCOSE BY METER POCT 08/07/2021 152*     WBC Count 08/07/2021 17.8*     RBC Count 08/07/2021 4.07      Hemoglobin 08/07/2021 10.4*     Hematocrit 08/07/2021 31.5*     MCV 08/07/2021 77*     MCH 08/07/2021 25.6*     MCHC 08/07/2021 33.0      RDW 08/07/2021 14.1      Platelet Count 08/07/2021 503*     Creatinine 08/07/2021 0.58*     GFR Estimate 08/07/2021 81      Hold Specimen 08/07/2021 [facility]      Gram Stain Result 08/07/2021 No PMNs seen*     Gram Stain Result 08/07/2021 4+ Gram positive cocci in chains*     Gram Stain Result 08/07/2021 3+ Gram positive bacilli*     Culture 08/07/2021 Normal Marga with      Culture 08/07/2021 4+ Proteus mirabilis*     Magnesium 08/08/2021 1.9      Potassium 08/08/2021 3.4*     WBC Count 08/08/2021 18.4*     RBC Count 08/08/2021 3.82      Hemoglobin 08/08/2021 9.8*     Hematocrit 08/08/2021 29.6*     MCV 08/08/2021 78      MCH 08/08/2021 25.7*     MCHC 08/08/2021 33.1      RDW 08/08/2021 14.4      Platelet Count 08/08/2021 480*     Sodium 08/08/2021 130*     Potassium 08/08/2021 3.3*     Chloride 08/08/2021 100      Carbon Dioxide (CO2) 08/08/2021 20*     Anion Gap  08/08/2021 10      Urea Nitrogen 08/08/2021 14      Creatinine 08/08/2021 0.56*     Calcium 08/08/2021 8.8      Glucose 08/08/2021 129*     GFR Estimate 08/08/2021 82      Potassium 08/08/2021 3.7      WBC Count 08/09/2021 15.6*     RBC Count 08/09/2021 3.75*     Hemoglobin 08/09/2021 9.4*     Hematocrit 08/09/2021 29.1*     MCV 08/09/2021 78      MCH 08/09/2021 25.1*     MCHC 08/09/2021 32.3      RDW 08/09/2021 14.5      Platelet Count 08/09/2021 483*     Magnesium 08/09/2021 2.0      Potassium 08/09/2021 3.4*     GLUCOSE BY METER POCT 08/09/2021 102      Sodium 08/09/2021 129*     Potassium 08/09/2021 3.4*     Chloride 08/09/2021 102      Carbon Dioxide (CO2) 08/09/2021 21*     Anion Gap 08/09/2021 6      Urea Nitrogen 08/09/2021 19      Creatinine 08/09/2021 0.55*     Calcium 08/09/2021 8.6      Glucose 08/09/2021 123      GFR Estimate 08/09/2021 82      Potassium 08/09/2021 3.7      WBC Count 08/10/2021 11.5*     RBC Count 08/10/2021 3.74*     Hemoglobin 08/10/2021 9.3*     Hematocrit 08/10/2021 28.4*     MCV 08/10/2021 76*     MCH 08/10/2021 24.9*     MCHC 08/10/2021 32.7      RDW 08/10/2021 14.6      Platelet Count 08/10/2021 480*     Magnesium 08/10/2021 1.8      Sodium 08/10/2021 131*     Potassium 08/10/2021 3.3*     Chloride 08/10/2021 103      Carbon Dioxide (CO2) 08/10/2021 23      Anion Gap 08/10/2021 5      Urea Nitrogen 08/10/2021 15      Creatinine 08/10/2021 0.50*     Calcium 08/10/2021 8.2*     Glucose 08/10/2021 125      GFR Estimate 08/10/2021 85      Potassium 08/10/2021 3.0*     Potassium 08/10/2021 3.1*     WBC Count 08/11/2021 10.8      RBC Count 08/11/2021 3.76*     Hemoglobin 08/11/2021 9.5*     Hematocrit 08/11/2021 28.9*     MCV 08/11/2021 77*     MCH 08/11/2021 25.3*     MCHC 08/11/2021 32.9      RDW 08/11/2021 14.5      Platelet Count 08/11/2021 466*     Magnesium 08/11/2021 1.7*     Sodium 08/11/2021 134*     Potassium 08/11/2021 3.2*     Chloride 08/11/2021 105      Carbon Dioxide  (CO2) 08/11/2021 23      Anion Gap 08/11/2021 6      Urea Nitrogen 08/11/2021 9      Creatinine 08/11/2021 0.46*     Calcium 08/11/2021 7.7*     Glucose 08/11/2021 106      GFR Estimate 08/11/2021 87         LEXA MartinC

## 2021-08-11 NOTE — PROGRESS NOTES
[facility]    The SLUMS ([facility] Mental Status Exam) is a 30-point standardized cognitive screen used to identify the presence of cognitive deficits and/or to identify a change in cognition over time.  This screen assesses cognitive abilities in various domains.  (aging@\[facility]\"".[facility])    Patient's performance was as follows:      Total Score: 19/30           Scoring If High School Educated If Less than High School Educated   Normal 27-30 25-30   Mild Neurocognitive Disorder 21-26 20-24   Dementia 1-20 1-19       Score Interpretation: Score places patient in the dementia category.  Patient demonstrates deficits in the following areas: orientation and executive functioning.  Please note that this examination is used to screen individuals to look for the presence of cognitive deficits and to identify changes in cognition over time.  This is not a diagnosis.  This examination can be followed by further cognitive assessments if appropriate and deemed necessary.    Recommended that pt receive assistance with cooking, driving, financial management and medication management.      Total Time with Patient: 15 minutes

## 2021-08-11 NOTE — PROCEDURES
[facility]    Procedure: Imaging Procedure Note    Date/Time: 8/11/2021 11:53 AM  Performed by: Jam Fall MD  Authorized by: Jam Fall MD     UNIVERSAL PROTOCOL   Site Marked: Yes  Prior Images Obtained and Reviewed:  Yes  Required items: Required blood products, implants, devices and special equipment available    Patient identity confirmed:  Verbally with patient  Patient was reevaluated immediately before administering moderate or deep sedation or anesthesia  Confirmation Checklist:  Patient's identity using two indicators, relevant allergies, procedure was appropriate and matched the consent or emergent situation and correct equipment/implants were available  Time out: Immediately prior to the procedure a time out was called    Universal Protocol: the Joint Commission Universal Protocol was followed    Preparation: Patient was prepped and draped in usual sterile fashion    ESBL (mL):  2         ANESTHESIA    Anesthesia: Local infiltration  Local Anesthetic:  Lidocaine 1% without epinephrine  Anesthetic Total (mL):  10      SEDATION    Patient Sedated: Yes    Sedation Type:  Moderate (conscious) sedation  Sedation:  Fentanyl and midazolam  Vital signs: Vital signs monitored during sedation    See dictated procedure note for full details.  PROCEDURE   Patient Tolerance:  Patient tolerated the procedure well with no immediate complications  Describe Procedure: Utilizing computed tomography guidance, a 12 F locking pigtail drain was placed into the abscess cavity in the right lower quadrant of the abdomen. The tip of the drain was advanced into the most dependant (posterior) portion of the abdomen.  Length of time physician/provider present for 1:1 monitoring during sedation: 15

## 2021-08-11 NOTE — PROVIDER NOTIFICATION
"Dr. Diony Ledezma notified lab is unable to run cell count on body fluids from drain placement d/t sample being \"too clotted.\"    Latoya Obrien RN  "

## 2021-08-11 NOTE — PLAN OF CARE
Problem: Adult Inpatient Plan of Care  Goal: Optimal Comfort and Wellbeing  Outcome: Improving   Pt comfortable in bed and repositioned for comfort with pillow support. Pt able to verbalize needs.   Problem: Hypertension Acute  Goal: Blood Pressure Within Desired Range  Outcome: Improving   Vitals stable this shift.   Problem: Dysrhythmia  Goal: Normalized Cardiac Rhythm  Outcome: Improving   NSR on tele.

## 2021-08-11 NOTE — PRE-PROCEDURE
GENERAL PRE-PROCEDURE:     Verbal consent obtained?: Yes    Written consent obtained?: Yes    Risks and benefits: Risks, benefits and alternatives were discussed    Consent given by:  Patient  Patient states understanding of procedure being performed: Yes    Patient's understanding of procedure matches consent: Yes    Procedure consent matches procedure scheduled: Yes    Expected level of sedation:  Moderate  Appropriately NPO:  Yes  Lungs:  Crackles left base and crackles right base  Heart:  A-fib  History & Physical reviewed:  History and physical reviewed and no updates needed  Statement of review:  I have reviewed the lab findings, diagnostic data, medications, and the plan for sedation

## 2021-08-11 NOTE — PROGRESS NOTES
Care Management Follow Up    Length of Stay (days): 5    Expected Discharge Date: 08/13/2021     Concerns to be Addressed:       Patient plan of care discussed at interdisciplinary rounds: Yes    Anticipated Discharge Disposition:  [facility]     Anticipated Discharge Services:  TCU  Anticipated Discharge DME:  TBD    Patient/family educated on Medicare website which has current facility and service quality ratings:  Yes  Education Provided on the Discharge Plan:  Yes  Patient/Family in Agreement with the Plan:  Yes    Referrals Placed by CM/SW:  TCU  Private pay costs discussed: Yes    Additional Information:    SHELL called and left a  for admissions coordinator at [facility] to notify her that Pt will not be ready for discharge today and will follow up on medical readiness and bed availability.       YADI Echeverria

## 2021-08-11 NOTE — PROGRESS NOTES
Patient Name: Angelica Toro  Medical Record Number: [id number]  Today's Date: 8/11/2021    Procedure: Abdominal Abscess drain placement  Proceduralist: Dr Fall    Procedure Start: 1127  Procedure end: 1145  Sedation medications administered: Fentanyl 25mcg Versed 0.5mg     Report given to: Martha MATTHEWS RN  : VAN    Other Notes: Pt arrived to CT room from [facility]. Consent reviewed. Pt denies any questions or concerns regarding procedure. Pt positioned supine and monitored per protocol. Pt tolerated procedure without any noted complications. Pt transferred back to [facility].    Loly Carvajal RN

## 2021-08-12 NOTE — PROGRESS NOTES
CLINICAL NUTRITION SERVICES - REASSESSMENT NOTE      EVALUATION OF THE PROGRESS TOWARD GOALS   Diet: regular just advanced today   Nutrition Supplement/Support ensure clear discontinued   Intake: 100%     NEW FINDINGS   Per RN reportedly doing a little better. She was sleeping when I stopped in     ANTHROPOMETRICS  Admission wt: 58kg  Most Recent Weight: 65kg      GI Status  Hypoactive bs and gas discomfort per RN flowsheet  Last documented BM on 8/10/21    LABS  Reviewed; Na-134, Cr-.46, Ca-7.7, Mg-1.7    MEDICATIONS  Reviewed      NUTRITION DIAGNOSIS  Altered GI function related to perforated diverticulitis as evidenced by need for NPO and drain placement      Goals:  Diet advancement vs nutrition support within 2-3 days.-met  (new) Meet est needs        INTERVENTIONS  Implementation  Trial Ensure daily       Monitoring/Evaluation  Progress toward goals will be monitored and evaluated per protocol.

## 2021-08-12 NOTE — PROGRESS NOTES
"[facility] MEDICINE PROGRESS NOTE      Identification/Summary: Angelica Toro is a 89 year old female admitted on 8/5/2021. She has a history of bilateral ovarian masses, CAD, HTN, HLD, and chronic hyponatremia presents with fever and chills and admitted for acute cystitis. Her course was complicated by new-onset atrial fibrillation with RVR for which cardiology was consulted and amiodarone and heparin infusions started and they have now transitioned her to oral amiodarone and apixaban. Acute onset of ongoing abdominal pain, without rigidity or signs of peritonitis, and abnormal plain film abdominal imaging with wall swelling/inflammation, CT scan demonstrated acute perforated sigmoid diverticulitis with 2 pelvic abscesses. The largest measures 6.5 x 5.4 cm. She started on piperacillin tazobactam and underwent IR drained the abscess 8/7.  Nausea resolved on 8/9.  She is slowly feeling better.  Surgery starting her on clear liquids on 8/9.  Repeat CT on 8/10 indicates she needs an additional drain placed this was done on 8/11. Sent for cultures and now growing Klebsiella, Bacteroides and Clostridium.  Restarted her apixaban 8/11 and will advance she tolerated full liquids and will advance her to soft food today. She is feeling hungry and eager to advance diet and overall feels quite a bit better.  General surgery is signing off because they feel this pelvic/abdominal abscess is more likely a GYN issue.  We will consult infectious disease for tomorrow to help guide treatment.        Assessment and Plan:     Sepsis secondary to:   Acute Perforated Sigmoid Diverticulitis with Abscesses  Pelvic Abscess  ?Uterine Abscess  CT scan on 8/6 demonstrated above acute findings, and general surgery and IR consulted, and subsequently IR placeed drain on 8/7  Gynecology recommended ongoing conservative medical management without surgery as tolerated, and recommended IR drain placement \"without regard to " "possible uterine origin\"  Short-term had broadened IV piperacillin-tazobactam and vancomycin, discontinue vancomycin and continue with piperacillin tazobactam.  New drain placement and residual fluid formation in the pelvis/abdomen done 8/11. I discussed with general surgery 8/11 and they feel this is more of a GYN issue and will sign off.  Plan: Advance diet to soft food today as she is feeling hungry and has tolerated full liquids.        Atrial Fibrillation with RVR  Converted to NSR  Started on amiodarone, continue but now switch to oral  Continue Coreg and Diltiazem   IV heparin has been stopped and she was started on apixaban.  Has been followed by cardiology  Per cardiology apixaban restarted on 8/11.    Significant deconditioning  Likely needs a couple more days of therapy and close observation here  Plan: To TCU but now likely in 3 to 4 days and also depending upon infectious disease recommendations     Hypokalemia  Replace per protocol       UTI  UC grew Ecoli  On piperacillin-tazobactam, continue      Chronic Hyponatremia  Na was 128 in 3/10/2021, and most recently was 124 on 7/14/2021 a month ago  Na at admission is also 124 and unchanged from a month ago and baseline  Sodium 134   Plan: Check sodium in the morning     Severe protein-calorie malnutrition with albumin of 2.4.  Plan: Encourage healthy eating and Ensure clear supplements between meals     HTN  CAD  Order home medications.     HLD  Order home statin.     Dispo  PT, OT bk  CM/SW  Plan: Likely will need TCU but most likely here at least another couple days     Diet:  General surgery started clear liquids today  DVT Prophylaxis:  Apixaban  Ferrer Catheter: Not present  Central Lines: None  Code Status: No CPR- Do NOT Intubate       Disposition Plan   Expected discharge: 3 to 4 days now depending upon how she does and infectious disease recommendations.  Will need to go to TCU.      Interval History/Subjective:  Definitely feeling better " today.  Early satiety but otherwise is hungry and wants to advance her diet.  Less pain.  Was able to stand at bedside and that's a good improvement in terms of strength and was up in chair today.    Physical Exam/Objective:  Temp:  [97.1  F (36.2  C)-98.7  F (37.1  C)] 97.3  F (36.3  C)  Pulse:  [69-84] 69  Resp:  [14-20] 18  BP: (134-176)/(61-74) 143/65  SpO2:  [92 %-95 %] 93 %    Body mass index is 26.3 kg/m .    GENERAL:  Alert, appears comfortable, in no acute distress, appears stated age   HEAD:  Normocephalic, without obvious abnormality, atraumatic   LUNGS:   Clear to auscultation bilaterally, no rales, rhonchi, or wheezing, symmetric chest rise on inhalation, respirations unlabored   HEART:  Regular rate and rhythm, S1 and S2 normal, no murmur, rub, or gallop    ABDOMEN:   Soft, mild to moderate tenderness in the right lower quadrant around the drain area, no masses, no organomegaly, no rebound or guarding   EXTREMITIES:  Trace ankle edema    SKIN: Dry to touch, no exanthems in the visualized areas drain in place right lower quadrant and also one not observed in the pelvic area   NEURO: Alert, appears cognitively intact, moves all four extremities freely   PSYCH: Cooperative, behavior is appropriate      Medications:   Personally Reviewed.  Medications       acetaminophen  650 mg Oral Once     amiodarone  200 mg Oral BID     apixaban ANTICOAGULANT  2.5 mg Oral BID     aspirin  81 mg Oral Daily     carvedilol  6.25 mg Oral BID w/meals     diltiazem ER COATED BEADS  180 mg Oral Daily    And     diltiazem ER COATED BEADS  120 mg Oral Daily     piperacillin-tazobactam  3.375 g Intravenous Q8H     potassium chloride  20 mEq Oral TID     sodium chloride (PF)  10 mL Irrigation Q8H     sodium chloride (PF)  3 mL Intracatheter Q8H     valsartan  80 mg Oral QPM       Data reviewed today: I personally reviewed all new medications, labs, imaging/diagnostics reports over the past 24 hours. Pertinent findings  include:    Labs:  Most Recent 3 CBC's:Recent Labs   Lab Test 08/12/21  0515 08/11/21  0514 08/10/21  0550   WBC 10.8 10.8 11.5*   HGB 9.7* 9.5* 9.3*   MCV 76* 77* 76*   * 466* 480*     Most Recent 3 BMP's:Recent Labs   Lab Test 08/12/21  0515 08/12/21  0017 08/11/21  1612 08/11/21  0514 08/10/21  0550 08/09/21  0454   NA  --   --   --  134* 131* 129*   POTASSIUM 3.9 4.1 3.1* 3.2* 3.3* 3.4*  3.4*   CHLORIDE  --   --   --  105 103 102   CO2  --   --   --  23 23 21*   BUN  --   --   --  9 15 19   CR  --   --   --  0.46* 0.50* 0.55*   ANIONGAP  --   --   --  6 5 6   ABIGAIL  --   --   --  7.7* 8.2* 8.6   GLC  --   --   --  106 125 123       Diony Ledezma MD  [facility]  Phone: #[phone]

## 2021-08-12 NOTE — PLAN OF CARE
Problem: Risk for Delirium  Goal: Improved Behavioral Control  Outcome: No Change     Problem: Dysrhythmia  Goal: Normalized Cardiac Rhythm  Outcome: No Change   Pt sleeping at intervals.  Tele remains NSR.  Pt continues to receive IV antibiotics.  Replacing K+ via IV.  Recheck 4.1.  Pt encouraged to turn and reposition.  Drains intact and draining serosang drainage.  Irrigated with 10cc to each tube.

## 2021-08-12 NOTE — PROGRESS NOTES
10:42 AM  08/12/21    [facility] called Angela and spoke with Maynor in admission.  Maynor states if Pt is medically ready tomorrow they will be able to take her.  CM can touch base in the morning on medical readiness.     YADI Echeverria

## 2021-08-12 NOTE — IR NOTE
"  Interventional Radiology - Progress Note  Inpatient - [facility]: Interventional Radiology   [phone]  8/12/2021    S:  Angelica Toro is a 89 year old female admitted on 8/5/2021. She has a history of bilateral ovarian masses, CAD, HTN, HLD, and chronic hyponatremia presents with fever and chills and admitted for acute cystitis and acute perforated sigmoid diverticulitis.  Dx with abdominal/intrauterine abscesses presumably from diverticulitis.    She is s/p CT guided RIGHT transgluteal drain placement 8/7/2021-   Component      Latest Ref Rng & Units 8/7/2021 8/7/2021          12:36 PM 12:36 PM   Culture       4+ Proteus mirabilis (A) 4+ Clostridium ramosum (A)     Component      Latest Ref Rng & Units 8/7/2021          12:36 PM   Culture       4+ Bacteroides fragilis (A)     She is s/p CT guided RIGHT lower quadrant drain placement 8/11/2021- Cultures in process    Antibiotic: Zosyn  Flushing: RLQ tube 10mL BID    AF.  Tmax 98.7.  VSS, hypertensive.     O:  /61 (BP Location: Left arm)   Pulse 71   Temp 97.1  F (36.2  C) (Oral)   Resp 18   Ht 1.575 m (5' 2\")   Wt 65.2 kg (143 lb 12.8 oz)   SpO2 95%   BMI 26.30 kg/m        EXAM:  1. PERCUTANEOUS DRAIN PLACEMENT RIGHT LOWER QUADRANT  2. CT GUIDANCE  3. CONSCIOUS SEDATION  LOCATION: [facility]  DATE/TIME: 8/11/2021 10:49 AM     INDICATION: Patient with known intra-abdominal/pelvic abscess with recent CT-guided drain placement and now on repeat CT today radiologist recommended additional drain placement.  TECHNIQUE: Dose reduction techniques were used.     PROCEDURE: Informed consent obtained. Site marked. Prior images reviewed. Required items made available. Patient identity confirmed verbally and with arm band. Patient reevaluated immediately before administering sedation. Universal protocol was   followed. Time out performed. The site was prepped and draped in sterile fashion. 10 mL of 1% lidocaine was " infused into the local soft tissues. Using standard technique and under direct CT guidance, a 12 Albanian drainage catheter was inserted into the   fluid collection.     The tip of the drainage catheter was placed in the most dependent portion of the fluid collection which, in a predominantly supine patient, was the posterior aspect.     SPECIMEN: 10 mL of light yellow/red fluid was aspirated and sent to lab for cultures and Gram stain.     BLOOD LOSS: Minimal.     The patient tolerated the procedure well. No immediate complications.     SEDATION: Versed 0.5 mg. Fentanyl 25 mcg. The procedure was performed with administration intravenous conscious sedation with appropriate preoperative, intraoperative, and postoperative evaluation.     15 minutes of supervised face to face conscious sedation time was provided by a radiology nurse under my direct supervision.                                                                      IMPRESSION:  1.  CT-guided drain placement into right lower quadrant abdominal abscess.      EXAM:  1. PERCUTANEOUS DRAIN PLACEMENT OF PELVIC ABSCESS  2. CT GUIDANCE  3. CONSCIOUS SEDATION  LOCATION: [facility]  DATE/TIME: 8/7/2021 10:48 AM     INDICATION: perforated abscess of sigmoid and/or uterus  TECHNIQUE: Dose reduction techniques were used.     PROCEDURE: Informed consent obtained. Site marked. Prior images reviewed. Required items made available. Patient identity confirmed verbally and with arm band. Patient reevaluated immediately before administering sedation. Universal protocol was   followed. Time out performed. The site was prepped and draped in sterile fashion. 10 mL of 1% lidocaine was infused into the local soft tissues. Using standard technique and under direct CT guidance, a modified 12 Albanian drainage catheter was inserted   into the fluid collection. Additional sideholes were added to be placed within the free fluid in the pelvis.     SPECIMEN: 3 mL  of purulent fluid was aspirated and sent to lab for cultures and Gram stain.     BLOOD LOSS: Minimal.     The patient tolerated the procedure well. No immediate complications.     SEDATION: Fentanyl 25 mcg. The procedure was performed with administration intravenous conscious sedation with appropriate preoperative, intraoperative, and postoperative evaluation.     30 minutes of supervised face to face conscious sedation time was provided by a radiology nurse under my direct supervision.                                                                      IMPRESSION:  1.  Successful CT-guided drain placement into presumed uterine abscess. This 12 Tongan drain was modified with additional sideholes which was positioned within the loculated fluid in the pelvis to provide drainage of both fluid collections.    LABS:  CBC RESULTS: Recent Labs   Lab Test 08/12/21  0515   WBC 10.8   RBC 3.93   HGB 9.7*   HCT 29.7*   MCV 76*   MCH 24.7*   MCHC 32.7   RDW 14.6   *        Drain Outputs (in mL):      A:  89 yo F with hx of acute perforated sigmoid diverticulitis.  Dx with abdominal/intrauterine abscesses presumably from diverticulitis.    - s/p drain placement RIGHT transgluteal drain placement 8/7/2021- 35 mL out last 24 hours  - s/p drain placement RIGHT lower quadrant drain placement 8/11/2021- 30 mL out last 24 hours  - Culture positive  - On abx    P:    - Continue drain cares  - Anticipate ordering CT and abscessogram in coming days to evaluate- ordered.      MARISOL BOLIVAR NP  Interventional Radiology  [phone]

## 2021-08-12 NOTE — PLAN OF CARE
Problem: Hypertension Acute  Goal: Blood Pressure Within Desired Range  Outcome: Improving    Pt /74 this AM. Scheduled antihypertensives given, recheck /63. /63 at 1705.    Problem: Adult Inpatient Plan of Care  Goal: Plan of Care Review  Outcome: Improving    Pt denies pain this shift. GUANAKO dressing's C,D,I and drains x 2 irrigated this afternoon. Total output for GUANAKO #1= 15 mL and GUANAKO #2= 10 mL. Pt resting in room a majority of the shift. Pt was up with A:2 to chair this morning, tolerated fairly well. Pt remains on IV anbx. Diet advanced to regular this shift, pt has decrease in appetite, PO fluid and food intake encouraged. Tele NSR x 3. K 3.9, recheck tomorrow AM.

## 2021-08-13 NOTE — PROGRESS NOTES
"[facility] Medicine PROGRESS NOTE      Identification/Summary:   Angelica Toro is a 89 year old female admitted on 8/5/2021. She has a history of bilateral ovarian masses, CAD, HTN, HLD, and chronic hyponatremia presents with fever and chills and admitted for acute cystitis. Her course was complicated by new-onset atrial fibrillation with RVR for which cardiology was consulted and amiodarone and heparin infusions started and they have now transitioned her to oral amiodarone and apixaban. Acute onset of ongoing abdominal pain, without rigidity or signs of peritonitis, CT scan demonstrated acute perforated sigmoid diverticulitis with 2 pelvic abscesses. The largest measures 6.5 x 5.4 cm. She started on piperacillin tazobactam and underwent IR drained abscess 8/7. She is slowly feeling better. Surgery starting her on clear liquids on 8/9.  Repeat CT on 8/10 indicates she needs an additional drain placed, this was done on 8/11 without difficulty. General surgery signed off, because they feel this pelvic/abdominal abscess is more likely a GYN issue.         Assessment and Plan:     Sepsis secondary to:   Acute Perforated Sigmoid Diverticulitis with Abscesses  Pelvic Abscess  ?Uterine Abscess  CT scan on 8/6 demonstrated above acute findings, and general surgery and IR consulted, and subsequently IR placed drain on 8/7, 8/11  Gynecology recommended ongoing conservative medical management without surgery as tolerated, and recommended IR drain placement \"without regard to possible uterine origin\"  Short-term had broadened IV piperacillin-tazobactam and vancomycin, discontinue vancomycin and continue with piperacillin tazobactam.  New drain placement for residual fluid formation in the pelvis/abdomen 8/11  Cultures so far with Proteus, Clostridium, bacteroids  Appreciate ID input  Recommending palliative antibiotic treatment such as Augmentin and Levaquin.     Atrial Fibrillation with RVR  Converted to NSR  Started " "on amiodarone, continue but now switch to oral  Continue Coreg and Diltiazem   IV heparin has been stopped  Has been followed by cardiology  On apixaban.     Hypokalemia  Replace per protocol       UTI  UC grew Ecoli  On piperacillin-tazobactam, continue      Chronic Hyponatremia  Na was 128 in 3/10/2021, and most recently was 124 on 7/14/2021 a month ago  Na at admission is also 124 and unchanged from a month ago and baseline  Sodium 134 today.  Plan: Check sodium in the morning     Severe protein-calorie malnutrition with albumin of 2.4.  Plan: Encourage healthy eating and Ensure clear supplements between meals     HTN  CAD  Order home medications.     HLD  Order home statin.       Diet: Advance Diet as Tolerated: Regular Diet Adult  Snacks/Supplements Adult: Ensure Enlive; Between Meals  DVT Prophylaxis on apixaban  Code Status: No CPR- Do NOT Intubate    Anticipated possible discharge in few days once clinical progress, improvement milestones are met.    Interval History/Subjective:  Feels tired.  He denies any nausea vomiting.  She had a stool yesterday.  Abdominal pain is controlled.  No other shortness of breath, chest pain.    Physical Exam/Objective:  Vitals I/O   Vital signs:  Temp: 98  F (36.7  C) Temp src: Oral BP: 122/56 Pulse: 73   Resp: 20 SpO2: 96 % O2 Device: None (Room air) Oxygen Delivery: 3 LPM Height: 157.5 cm (5' 2\") Weight: 66.3 kg (146 lb 3.2 oz)  Estimated body mass index is 26.74 kg/m  as calculated from the following:    Height as of this encounter: 1.575 m (5' 2\").    Weight as of this encounter: 66.3 kg (146 lb 3.2 oz).       I/O last 3 completed shifts:  In: 380 [P.O.:280; I.V.:100]  Out: 2087.5 [Urine:2040; Drains:47.5]     Body mass index is 26.74 kg/m .    General Appearance:  Alert, cooperative, no distress   Head:  Normocephalic, atraumatic   Eyes:  PERRL    Throat:  mucosa; moist   Neck: No JVD, thyromegaly   Lungs:   Clear to auscultation bilaterally, respirations unlabored "   Chest Wall:  No tenderness or deformity   Heart:  Regular rate and rhythm, S1, S2 normal,no murmur   Abdomen:   Soft, non tender, non distended, bowel sounds present, no guarding or rigidity she has 2 drains in her abdomen with a serosanguineous fluid.   Extremities: No edema, no joint swelling   Skin: Skin color, texture, turgor normal, no rashes or lesions   Neurologic: Alert and oriented X 3, Moves all 4 extremities       Medications:   Personally Reviewed.    acetaminophen  650 mg Oral Once     amiodarone  200 mg Oral BID     apixaban ANTICOAGULANT  2.5 mg Oral BID     aspirin  81 mg Oral Daily     carvedilol  6.25 mg Oral BID w/meals     diltiazem ER COATED BEADS  180 mg Oral Daily    And     diltiazem ER COATED BEADS  120 mg Oral Daily     piperacillin-tazobactam  3.375 g Intravenous Q8H     potassium chloride  20 mEq Oral TID     sodium chloride (PF)  10 mL Irrigation Q8H     sodium chloride (PF)  3 mL Intracatheter Q8H     valsartan  80 mg Oral QPM       Data reviewed today: I personally reviewed all new medications, labs, imaging/diagnostics reports over the past 24 hours. Pertinent findings include    Labs:  Most Recent 3 CBC's:Recent Labs   Lab Test 08/13/21  0526 08/12/21  0515 08/11/21  0514   WBC 9.6 10.8 10.8   HGB 9.5* 9.7* 9.5*   MCV 76* 76* 77*   * 489* 466*     Most Recent 3 BMP's:Recent Labs   Lab Test 08/13/21  0526 08/12/21  0515 08/12/21  0017 08/11/21  0514 08/10/21  0550   *  --   --  134* 131*   POTASSIUM 3.7 3.9 4.1 3.2* 3.3*   CHLORIDE 96*  --   --  105 103   CO2 28  --   --  23 23   BUN 8  --   --  9 15   CR 0.52*  --   --  0.46* 0.50*   ANIONGAP 6  --   --  6 5   ABIGAIL 7.7*  --   --  7.7* 8.2*     --   --  106 125     Most Recent 6 Bacteria Isolates From Any Culture (See EPIC Reports for Culture Details):No lab results found.      Jennifer Valenzuela MD  Hospitalist  [facility]

## 2021-08-13 NOTE — PROVIDER NOTIFICATION
Notified Dr. Valenzuela that patient's posterior GUANAKO tubing has accidentally been pulled d when patient was moving out of bed and snapped in half.  The drain is still inserted in the patient but is no longer connected to the bulb and cannot be reattached as the break did not occur at the t-piece where irrigation was being placed. Dr. Valenzuela requesting nursing call IR for additional orders.

## 2021-08-13 NOTE — PLAN OF CARE
Problem: Adult Inpatient Plan of Care  Goal: Absence of Hospital-Acquired Illness or Injury  Outcome: No Change  Intervention: Identify and Manage Fall Risk  Recent Flowsheet Documentation  Taken 8/13/2021 0000 by Silvia oRss RN  Safety Promotion/Fall Prevention: activity supervised  Taken 8/12/2021 2000 by Silvia Ross RN  Safety Promotion/Fall Prevention: activity supervised  Intervention: Prevent Skin Injury  Recent Flowsheet Documentation  Taken 8/13/2021 0000 by Silvia Ross RN  Body Position: position changed independently  Taken 8/12/2021 2000 by Silvia Ross RN  Body Position: supine  Taken 8/12/2021 1955 by Silvia Ross RN  Body Position: position changed independently  Goal: Optimal Comfort and Wellbeing  Outcome: No Change     Problem: Dysrhythmia  Goal: Normalized Cardiac Rhythm  Outcome: No Change   Pt denies pain.  Tele shows NSR.  Pt sleeping at intervals.  Purewick in place overnight. Pt receiving IV antibiotics.  IV infiltrated in R hand overnight.  Warm pack placed.

## 2021-08-13 NOTE — PROGRESS NOTES
"  Interventional Radiology - Chart check  Inpatient - [facility]: Interventional Radiology   [phone]  8/13/2021    S:  Angelica Toro is a 89 year old female admitted on 8/5/2021. She has a history of bilateral ovarian masses, CAD, HTN, HLD, and chronic hyponatremia presents with fever and chills and admitted for acute cystitis and acute perforated sigmoid diverticulitis.  Dx with abdominal/intrauterine abscesses presumably from diverticulitis.     She is s/p CT guided RIGHT transgluteal drain placement 8/7/2021-   Component      Latest Ref Rng & Units 8/7/2021 8/7/2021          12:36 PM 12:36 PM   Culture       4+ Proteus mirabilis (A) 4+ Clostridium ramosum (A)      Component      Latest Ref Rng & Units 8/7/2021          12:36 PM   Culture       4+ Bacteroides fragilis (A)      She is s/p CT guided RIGHT lower quadrant drain placement 8/11/2021- Cultures in process, NGTD.     Antibiotic: Zosyn  Flushing: RLQ tube 10mL BID    Our team received a call that her transgluteal drain snapped and is no longer functioning.          O:  /56 (BP Location: Right arm)   Pulse 73   Temp 98  F (36.7  C) (Oral)   Resp 20   Ht 1.575 m (5' 2\")   Wt 66.3 kg (146 lb 3.2 oz)   SpO2 96%   BMI 26.74 kg/m        IMAGING:  EXAM:  1. PERCUTANEOUS DRAIN PLACEMENT RIGHT LOWER QUADRANT  2. CT GUIDANCE  3. CONSCIOUS SEDATION  LOCATION: [facility]  DATE/TIME: 8/11/2021 10:49 AM     INDICATION: Patient with known intra-abdominal/pelvic abscess with recent CT-guided drain placement and now on repeat CT today radiologist recommended additional drain placement.  TECHNIQUE: Dose reduction techniques were used.     PROCEDURE: Informed consent obtained. Site marked. Prior images reviewed. Required items made available. Patient identity confirmed verbally and with arm band. Patient reevaluated immediately before administering sedation. Universal protocol was   followed. Time out performed. The " site was prepped and draped in sterile fashion. 10 mL of 1% lidocaine was infused into the local soft tissues. Using standard technique and under direct CT guidance, a 12 Greenlandic drainage catheter was inserted into the   fluid collection.     The tip of the drainage catheter was placed in the most dependent portion of the fluid collection which, in a predominantly supine patient, was the posterior aspect.     SPECIMEN: 10 mL of light yellow/red fluid was aspirated and sent to lab for cultures and Gram stain.     BLOOD LOSS: Minimal.     The patient tolerated the procedure well. No immediate complications.     SEDATION: Versed 0.5 mg. Fentanyl 25 mcg. The procedure was performed with administration intravenous conscious sedation with appropriate preoperative, intraoperative, and postoperative evaluation.     15 minutes of supervised face to face conscious sedation time was provided by a radiology nurse under my direct supervision.                                                                      IMPRESSION:  1.  CT-guided drain placement into right lower quadrant abdominal abscess.        EXAM:  1. PERCUTANEOUS DRAIN PLACEMENT OF PELVIC ABSCESS  2. CT GUIDANCE  3. CONSCIOUS SEDATION  LOCATION: [facility]  DATE/TIME: 8/7/2021 10:48 AM     INDICATION: perforated abscess of sigmoid and/or uterus  TECHNIQUE: Dose reduction techniques were used.     PROCEDURE: Informed consent obtained. Site marked. Prior images reviewed. Required items made available. Patient identity confirmed verbally and with arm band. Patient reevaluated immediately before administering sedation. Universal protocol was   followed. Time out performed. The site was prepped and draped in sterile fashion. 10 mL of 1% lidocaine was infused into the local soft tissues. Using standard technique and under direct CT guidance, a modified 12 Greenlandic drainage catheter was inserted   into the fluid collection. Additional sideholes were  added to be placed within the free fluid in the pelvis.     SPECIMEN: 3 mL of purulent fluid was aspirated and sent to lab for cultures and Gram stain.     BLOOD LOSS: Minimal.     The patient tolerated the procedure well. No immediate complications.     SEDATION: Fentanyl 25 mcg. The procedure was performed with administration intravenous conscious sedation with appropriate preoperative, intraoperative, and postoperative evaluation.     30 minutes of supervised face to face conscious sedation time was provided by a radiology nurse under my direct supervision.                                                                      IMPRESSION:  1.  Successful CT-guided drain placement into presumed uterine abscess. This 12 Lao drain was modified with additional sideholes which was positioned within the loculated fluid in the pelvis to provide drainage of both fluid collections.    LABS:  CBC RESULTS: Recent Labs   Lab Test 08/13/21  0526   WBC 9.6   RBC 3.81   HGB 9.5*   HCT 29.0*   MCV 76*   MCH 24.9*   MCHC 32.8   RDW 14.8   *        Drain Outputs (in mL):      A:  89 yo F with hx of acute perforated sigmoid diverticulitis.  Dx with abdominal/intrauterine abscesses presumably from diverticulitis.     - s/p drain placement RIGHT transgluteal drain placement 8/7/2021, drain snapped today and is no longer functional per bedside staff  - s/p drain placement RIGHT lower quadrant drain placement 8/11/2021  - Culture positive  - On abx     P:    - Continue drain cares of RLQ drain  - Cap or remove transgluteal drain  - CT for evaluation of abscess Monday, if she is still inpt to assess need for replacement off transgluteal drain, ordered  - Anticipate ordering CT and abscessogram in coming days to evaluate RLQ drain and if discharged, need for drain replacement in transgluteal abscess- ordered.        MARISOL BOLIVAR, ANTONIO  Interventional Radiology  [phone]

## 2021-08-13 NOTE — CONSULTS
[facility]  General ID Service Consult      Patient: Angelica Toro  YOB: 1929, MRN: [id number]  Date of Admission:  8/5/2021  Date of Consult: 08/13/2021  Consult Requested by: Jennifer Valenzuela MD  Admission Diagnosis: Chills [R68.83]  Acute cystitis without hematuria [N30.00]      ID Assessment & Plan   89 years old female  With carcinoid tumor  That is post laparoscopy  Suspected perforated uterus  With pelvic abscess  Culture with Bacteroides Proteus and gram-positive's    Plan  Patient not septic, currently on Zosyn  Serous drainage in the percutaneous drains  Leukocytosis resolved  If not fir fir surgical  intervention consider oral palliative antibiotic treatment such as Augmentin and levofloxacin    ID will recheck on Monday        Hi Gaspar MD  [facility]  ______________________________________________________________________        History of Present Illness   89 yo female . bilateral ovarian masses, CAD, HTN, HLD, and chronic hyponatremia presents with fever and chills .She had a laparoscopy and removal of the ovarian mass was found to be a carcinoid tumor.   CT showed  Findings indicative of acute perforated sigmoid diverticulitis with 2 pelvic abscesses. The largest measures 6.5 x 5.4 cm and may be amenable to percutaneous drainage.  This was percutaneously drained with cultures as above  She has been on Zosyn  Today she denies pain but she is tender.  Has some nausea.  She has been eating but not much.  She is weak and deconditioned.  No fevers.  Surgery and GYN are not considering surgical intervention.      Review of Systems   The 10 point Review of Systems is negative other than noted in the HPI or here.     Past Medical History    Past Medical History:   Diagnosis Date     Breast cancer (H)     left, s/p lumpectomy     CAD (coronary artery disease)     MI 2010 s/p stent     Diastolic dysfunction      Diverticulosis      HLD  (hyperlipidemia)      HTN (hypertension)      Hx SBO      Right ovarian cyst      STEMI (ST elevation myocardial infarction) (H) 10/28/2010    inferior     Urinary incontinence        Past Surgical History   Past Surgical History:   Procedure Laterality Date     ANGIOPLASTY  2010     ANKLE FRACTURE SURGERY Right 1995     APPENDECTOMY       APPENDECTOMY  1975     AS CORNEAL TRANSPLANT,LAMELLAR       CATARACT EXTRACTION Bilateral 2007     CHOLECYSTECTOMY       CHOLECYSTECTOMY  1975     CORNEAL TRANSPLANT Bilateral 2011     FRACTURE TX, ANKLE RT/LT       LAPAROSCOPIC SALPINGO-OOPHORECTOMY Bilateral 3/26/2021    Procedure: Laparoscopic BSO. Cystoscopy;  Surgeon: Juanjose Camejo MD;  Location: [facility] OR     LIGATE VEIN       LUMPECTOMY BREAST Left      LUMPECTOMY BREAST Left 03/10/2017     ME PATIENT HAS A CORONARY ARTERY STENT  2010     US BREAST BIOPSY LT       VEIN SURGERY Right 1980       Social History   Social History     Tobacco Use     Smoking status: Never Smoker     Smokeless tobacco: Never Used   Substance Use Topics     Alcohol use: Yes     Alcohol/week: 1.0 standard drinks     Types: 1 Cans of beer per week     Comment: 3 times a year     Drug use: No       Family History   I have reviewed this patient's family history and updated it with pertinent information if needed.  Family History   Problem Relation Age of Onset     Breast Cancer Daughter 50.00     Breast Cancer Niece      Hypertension Mother      Uterine Cancer Mother      Prostate Cancer Father      Cancer Sister         small intestinal cancer.     No Known Problems Son         passed away in an accident.     No Known Problems Son      Heart Disease Daughter      Hyperlipidemia Daughter      Sjogren's Daughter      Hyperlipidemia Daughter        Medications   I have reviewed this patient's current medications    Allergies   Allergies   Allergen Reactions     Amlodipine Cough, Swelling and Other (See Comments)     PN: swollen tongue, irritated  throat     Anastrozole Palpitations     High b/p, headache     Lisinopril Anaphylaxis, Shortness Of Breath and Other (See Comments)     Losartan Swelling     PN: severe leg swelling     Sulfa Drugs Swelling and Anaphylaxis     Throat swelling     Thiazide-Type Diuretics Other (See Comments)     Shakes and chills.  Shakes and chills.     Coffee Bean Extract  [Coffea Arabica]      Other reaction(s): Other (See Comments)  PN: decaff coffee     Furosemide Swelling     Hydrochlorothiazide Swelling     Metolazone Other (See Comments)     PN: Trouble breathing and tremors     Morphine GI Disturbance     No Clinical Screening - See Comments Other (See Comments)     PN: decaff coffee       Physical Exam   Vital Signs: Temp: 98.2  F (36.8  C) Temp src: Oral BP: 119/62 Pulse: 73   Resp: 20 SpO2: 94 % O2 Device: None (Room air)    Weight: 146 lbs 3.2 oz    Gen. appearance nontoxic  Eyes no conjunctivitis or icterus  Neck no stiffness or neck vein distention, no LN  Heart  No edema  Lungs breathing comfortably  Abdomen soft tender JPx2 serous  Extremities no synovitis, trace edema  Skin  no rash or emboli  Neurologic alert oriented no focal deficits        Data   Inflammatory Markers No lab results found.     Hematology Studies   Recent Labs   Lab Test 08/13/21  0526 08/12/21  0515 08/11/21  0514 08/10/21  0550 08/09/21  0454 08/08/21  0510   WBC 9.6 10.8 10.8 11.5* 15.6* 18.4*   HGB 9.5* 9.7* 9.5* 9.3* 9.4* 9.8*   MCV 76* 76* 77* 76* 78 78   * 489* 466* 480* 483* 480*       Metabolic Studies   Recent Labs   Lab Test 08/13/21  0526 08/12/21  0515 08/12/21  0017 08/11/21  1612 08/11/21  0514 08/10/21  0550 08/09/21  0454 08/08/21  1209   *  --   --   --  134* 131* 129* 130*   POTASSIUM 3.7 3.9 4.1 3.1* 3.2* 3.3* 3.4*  3.4* 3.3*   CHLORIDE 96*  --   --   --  105 103 102 100   CO2 28  --   --   --  23 23 21* 20*   BUN 8  --   --   --  9 15 19 14   CR 0.52*  --   --   --  0.46* 0.50* 0.55* 0.56*   GFRESTIMATED 84  --    --   --  87 85 82 82       Hepatic Studies    Recent Labs   Lab Test 08/13/21  0526 08/05/21  1515   BILITOTAL 0.7 0.6   ALKPHOS 81 126*   ALBUMIN 1.4* 2.4*   AST 24 22   ALT 21 26       Most Recent 6 Bacteria Isolates From Any Culture (See EPIC Reports for Culture Details):No lab results found.    Urine Studies    Recent Labs   Lab Test 08/05/21  1701   LEUKEST 250 [place]/uL*   WBCU 25*       Vancomycin Levels  No lab results found.    Invalid input(s): VANCO    Hepatitis B Testing No lab results found.  Hepatitis C Testing   No results found for: HCVAB, HQTG, HCGENO, HCPCR, HQTRNA, HEPRNA  HIVTesting No lab results found.    Respiratory Virus Testing    No results found for: RS, FLUAG  COVID-19 Antibody Results, Testing for Immunity    COVID-19 Antibody Results, Testing for Immunity   No data to display.         COVID-19 PCR Results    COVID-19 PCR Results 3/23/21 8/5/21   COVID-19 Virus by PCR (External Result) Not Detected    SARS CoV2 PCR  Negative      Comments are available for some flowsheets but are not being displayed.

## 2021-08-14 NOTE — PLAN OF CARE
Pt remains pain free. VSS. Using call light to make needs known. GUANAKO drain intact and draining. Pt is working with PT/OT but is still very weak and unsteady. Awaiting repeat CT scan on 8/16 to assess need for drain and option to transition to oral antibiotics.

## 2021-08-14 NOTE — PROGRESS NOTES
"[facility] PROGRESS NOTE      Identification/Summary:   Angelica Toro is a 89 year old female admitted on 8/5/2021. She has a history of bilateral ovarian masses, CAD, HTN, HLD, and chronic hyponatremia presents with fever and chills and admitted for acute cystitis. Her course was complicated by new-onset atrial fibrillation with RVR for which cardiology was consulted and amiodarone and heparin infusions started and they have now transitioned her to oral amiodarone and apixaban. Acute onset of ongoing abdominal pain, without rigidity or signs of peritonitis, CT scan demonstrated acute perforated sigmoid diverticulitis with 2 pelvic abscesses. The largest measures 6.5 x 5.4 cm. She started on piperacillin tazobactam and underwent IR drained abscess 8/7. She is slowly feeling better. Surgery starting her on clear liquids on 8/9.  Repeat CT on 8/10 indicates she needs an additional drain placed, this was done on 8/11 without difficulty. General surgery signed off, because they feel this pelvic/abdominal abscess is more likely a GYN issue.  Drain #1 came out accidentally yesterday and IR recommended to pull it out.  She will have a repeat CT scan abdomen pelvis on Monday if she were to stay inpatient. She is eating better for the first time today. will plan on discharging likely tomorrow to TCU.      Assessment and Plan:     Sepsis secondary to:   Acute Perforated Sigmoid Diverticulitis with Abscesses  Pelvic Abscess  ?Uterine Abscess  CT scan on 8/6 demonstrated above acute findings, and general surgery and IR consulted, and subsequently IR placed drain on 8/7, 8/11  Gynecology recommended ongoing conservative medical management without surgery as tolerated, and recommended IR drain placement \"without regard to possible uterine origin\"  Short-term had broadened IV piperacillin-tazobactam and vancomycin, discontinue vancomycin and continue with piperacillin tazobactam.  New drain placement for residual " "fluid formation in the pelvis/abdomen 8/11  Cultures so far with Proteus, Clostridium, bacteroids  Appreciate ID input  Recommending palliative antibiotic treatment such as Augmentin and Levaquin.  DRAIN #1 got pulled out accidentally on 8/13.  IR recommended removing this drain.  She will have repeat CT scan of the abdomen on Monday if she were to stay inpatient.     Atrial Fibrillation with RVR  Converted to NSR  Started on amiodarone, continue but now switch to oral  Continue Coreg and Diltiazem   IV heparin has been stopped  Has been followed by cardiology  On apixaban.     Hypokalemia  Replace per protocol       UTI  UC grew Ecoli  On piperacillin-tazobactam, continue      Chronic Hyponatremia  Na was 128 in 3/10/2021, and most recently was 124 on 7/14/2021 a month ago  Monitor.    Severe protein-calorie malnutrition with albumin of 2.4.  Plan: Encourage healthy eating and Ensure clear supplements between meals     HTN  CAD  Order home medications.     HLD  Order home statin.       Diet: Snacks/Supplements Adult: Ensure Enlive; Between Meals  Regular Diet Adult  DVT Prophylaxis on apixaban  Code Status: No CPR- Do NOT Intubate    Anticipated possible discharge in 1-2 days once clinical progress, improvement milestones are met.    Interval History/Subjective:  She feels her appetite is much improved today.  Today's the first day that she ate much improved.  No nausea vomiting.  Abdominal pain is controlled.  No shortness of breath, chest pain.    Physical Exam/Objective:  Vitals I/O   Vital signs:  Temp: 98.9  F (37.2  C) Temp src: Oral BP: (!) 156/68 (notiefied RN) Pulse: 77   Resp: 16 SpO2: 94 % O2 Device: None (Room air) Oxygen Delivery: 3 LPM Height: 157.5 cm (5' 2\") Weight: 63.9 kg (140 lb 12.8 oz)  Estimated body mass index is 25.75 kg/m  as calculated from the following:    Height as of this encounter: 1.575 m (5' 2\").    Weight as of this encounter: 63.9 kg (140 lb 12.8 oz).       I/O last 3 completed " shifts:  In: 880 [P.O.:680; I.V.:200]  Out: 1153 [Urine:1150; Drains:23]     Body mass index is 25.75 kg/m .    General Appearance:  Alert, cooperative, no distress   Head:  Normocephalic, atraumatic   Eyes:  PERRL    Throat:  mucosa; moist   Neck: No JVD, thyromegaly   Lungs:   Clear to auscultation bilaterally, respirations unlabored   Chest Wall:  No tenderness or deformity   Heart:  Regular rate and rhythm, S1, S2 normal,no murmur   Abdomen:   Soft, non tender, non distended, bowel sounds present, no guarding or rigidity she has 1 drain in her abdomen with a serosanguineous fluid.   Extremities: No edema, no joint swelling   Skin: Skin color, texture, turgor normal, no rashes or lesions   Neurologic: Alert and oriented X 3, Moves all 4 extremities       Medications:   Personally Reviewed.    acetaminophen  650 mg Oral Once     amiodarone  200 mg Oral BID     apixaban ANTICOAGULANT  2.5 mg Oral BID     aspirin  81 mg Oral Daily     carvedilol  6.25 mg Oral BID w/meals     diltiazem ER COATED BEADS  180 mg Oral Daily    And     diltiazem ER COATED BEADS  120 mg Oral Daily     piperacillin-tazobactam  3.375 g Intravenous Q8H     potassium chloride  20 mEq Oral TID     sodium chloride (PF)  10 mL Irrigation Q8H     sodium chloride (PF)  3 mL Intracatheter Q8H     valsartan  80 mg Oral QPM       Data reviewed today: I personally reviewed all new medications, labs, imaging/diagnostics reports over the past 24 hours. Pertinent findings include    Labs:  Most Recent 3 CBC's:  Recent Labs   Lab Test 08/14/21  0555 08/13/21  0526 08/12/21  0515   WBC 10.6 9.6 10.8   HGB 9.7* 9.5* 9.7*   MCV 77* 76* 76*   * 492* 489*     Most Recent 3 BMP's:  Recent Labs   Lab Test 08/13/21  0526 08/12/21  0515 08/12/21  0017 08/11/21  0514 08/10/21  0550   *  --   --  134* 131*   POTASSIUM 3.7 3.9 4.1 3.2* 3.3*   CHLORIDE 96*  --   --  105 103   CO2 28  --   --  23 23   BUN 8  --   --  9 15   CR 0.52*  --   --  0.46* 0.50*    ANIONGAP 6  --   --  6 5   ABIGAIL 7.7*  --   --  7.7* 8.2*     --   --  106 125     Most Recent 6 Bacteria Isolates From Any Culture (See EPIC Reports for Culture Details):No lab results found.      Jennifer Valenzuela MD  Hospitalist  [facility]

## 2021-08-14 NOTE — PLAN OF CARE
Problem: UTI (Urinary Tract Infection)  Goal: Improved Infection Symptoms  Outcome: Improving     Problem: Dysrhythmia  Goal: Normalized Cardiac Rhythm  Outcome: Improving    Patient received Zosyn IV this shift for UTI.  VSS.  On RA.  NSR on telemetry. No complaints of pain.  Up with assist of one with gait belt and walker to [facility].   Right abdominal GUANAKO drain patent, draining serosanguinous fluid.  All cares performed as ordered and explained.  Will continue to monitor and follow plan of care.

## 2021-08-14 NOTE — PLAN OF CARE
Problem: UTI (Urinary Tract Infection)  Goal: Improved Infection Symptoms  Outcome: Improving     Problem: Adult Inpatient Plan of Care  Goal: Plan of Care Review  Outcome: Improving  Flowsheets  Taken 8/13/2021 1944 by Nakita Pearce RN  Progress: improving  Taken 8/7/2021 0701 by Elise Hester RN  Plan of Care Reviewed With: patient       Pt A&Ox4, calm and cooperative with cares. VSS on RA, NSR. Denies pain and nausea.  Tolerating soft diet but continues to have poor appetite. Up with SBA.  Abdominal GUANAKO drain patent.  Continue with current plan of care.

## 2021-08-14 NOTE — PROGRESS NOTES
Care Management Follow Up    Length of Stay (days): 8    Expected Discharge Date: 08/16/2021     Concerns to be Addressed:     Medical Progression. ID following-IV antibiotics. Advancing diet as tolerated. Repeat CT scan on Monday 8/16.   Patient plan of care discussed at interdisciplinary rounds: Yes    Anticipated Discharge Disposition:  TCU     Anticipated Discharge Services:  Therapy recommending TCU  Anticipated Discharge DME:  To be determined    Patient/family educated on Medicare website which has current facility and service quality ratings:  Yes  Education Provided on the Discharge Plan:  Per Team  Patient/Family in Agreement with the Plan:  Yes    Referrals Placed by CM/SW:  Referrals to TCU facilities previously sent  Private pay costs discussed: Not applicable    Additional Information:  Spoke with admissions at [facility] and updated on plan for possible discharge Monday 8/16 pending repeat CT scan. Will touch base with admissions at [facility] again on Monday 81/6. [facility] can likely accept and is patients preferred choice. PAS previously completed. Daughter to transport. Care manager to follow.       Arianne Blackman RN

## 2021-08-15 NOTE — PLAN OF CARE
Afebrile, VSS. No c/o pain. 1 GUANAKO drain. NSR. PIV- IV Zosyn. Awaiting CT on Monday to determine plan and TCU placement.

## 2021-08-15 NOTE — PLAN OF CARE
Problem: UTI (Urinary Tract Infection)  Goal: Improved Infection Symptoms  Outcome: Improving     Problem: Hypertension Acute  Goal: Blood Pressure Within Desired Range  Outcome: Improving     Problem: Dysrhythmia  Goal: Normalized Cardiac Rhythm  Outcome: Improving    Patient received Zosyn IV this shift for UTI.  VSS.  On RA.  NSR on telemetry. No complaints of pain.  Up with assist of one with gait belt and walker to [facility].   Right abdominal GUANAKO drain patent, draining serosanguinous fluid.  All cares performed as ordered and explained.  Will continue to monitor and follow plan of care.

## 2021-08-15 NOTE — PLAN OF CARE
Problem: Adult Inpatient Plan of Care  Goal: Plan of Care Review  Outcome: Improving   -Pt is alert and oriented x 4, pleasant. Transferred to floor this AM, family updated. A1 with ambulation and transfers. Telemetry running NSR. IV saline locked. Potassium protocol, check tomorrow AM. GUANAKO draining and intact. Regular diet. Plan is to discharge to TCU. Family here visiting.     Jamilah INTERIANO RN  08/15/21

## 2021-08-15 NOTE — PROGRESS NOTES
[facility] Medicine PROGRESS NOTE      Identification/Summary:   Angelica Toro is a 89 year old female admitted on 8/5/2021. 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 Her course was complicated by new-onset atrial fibrillation with RVR for which cardiology was consulted and amiodarone and heparin infusions started and they have now transitioned her to oral amiodarone and apixaban. Acute onset of ongoing abdominal pain, without rigidity or signs of peritonitis, CT scan demonstrated acute perforated sigmoid diverticulitis with 2 pelvic abscesses. The largest measures 6.5 x 5.4 cm. started on zosyn and underwent IR drained abscess 8/7. She is slowly feeling better. Surgery starting her on clear liquids on 8/9.  Repeat CT on 8/10 indicates she needs an additional drain placed, this was done on 8/11 without difficulty. General surgery signed off, because they feel this pelvic/abdominal abscess is more likely a GYN issue, suspected perforated uterus. Initial drain place don 8/7, came out accidentally 8/13 and IR recommended for it to be removed.  ID consulted and recommended changing her to Levaquin, Augmentin at discharge.  Repeat CT scan abdomen pelvis today showed persistent abscess in uterine area abt 4 cm, small bowel dilation concern for ileus vs SBO.  Will have surgery, gynecology, ID revisit with her.  She is tolerating diet.  Likely will need another drain by IR.  She will be here for at least 1-2 days.  TCU at discharge planned.     Assessment and Plan:     Sepsis secondary to:   Pelvic Abscesses  ?Uterine Abscess, suspected perforated uterus  History of ovarian masses s/p removal in March found to be carcinoid tumor  CT scan on 8/6 demonstrated above acute findings, and general surgery and IR consulted, and subsequently IR  "placed drain on 8/7, 8/11  Gynecology recommended ongoing conservative medical management without surgery as tolerated, and recommended IR drain placement \"without regard to possible uterine origin\"  Short-term had broadened IV piperacillin-tazobactam and vancomycin, discontinue vancomycin and continue with piperacillin tazobactam.  Cultures so far with Proteus, Clostridium, bacteroids  Appreciate ID input  Recommending palliative antibiotic treatment such as Augmentin and Levaquin.  Initial drain got pulled out accidentally on 8/13.  Was removed per IR recommendation.   repeat CT scan of the abdomen today reveals persistent abscess 4 cm, around the drain area, persistent  free fluid in the pelvis, small bowel distention is increased concern for obstruction versus ileus.  Patient is tolerating diet and had bowel movement 2 days ago. Passing flatus   IR reviewed her CT scan today, they are recommending CT abscessogram in the near future.   Have gynecology, ID, surgery revisit with her.    Atrial Fibrillation with RVR  Converted to NSR  Started on amiodarone, continue but now switch to oral  Continue Coreg and Diltiazem   IV heparin has been stopped  Has been followed by cardiology  On apixaban.   in sinus rhythm    Hypokalemia  Replace per protocol       UTI  UC grew Ecoli  On piperacillin-tazobactam, continue      Chronic Hyponatremia  Na was 128 in 3/10/2021, and most recently was 124 on 7/14/2021 a month ago  Monitor.    Severe protein-calorie malnutrition with albumin of 2.4.  Plan: Encourage healthy eating and Ensure clear supplements between meals     HTN  CAD  Order home medications.     HLD  Order home statin.       Diet: Snacks/Supplements Adult: Ensure Enlive; Between Meals  Regular Diet Adult  DVT Prophylaxis on apixaban  Code Status: No CPR- Do NOT Intubate    Anticipated possible discharge in 1-2 days once ileus is resolved are met.    Interval History/Subjective:   No nausea vomiting.  Abdominal pain is " "controlled.  No shortness of breath, chest pain.  Last stool was 2 days ago.  She is able to pass gas.     Physical Exam/Objective:  Vitals I/O   Vital signs:  Temp: 98.4  F (36.9  C) Temp src: Oral BP: 130/64 Pulse: 77   Resp: 16 SpO2: 93 % O2 Device: None (Room air) Oxygen Delivery: 3 LPM Height: 157.5 cm (5' 2\") Weight: 63.9 kg (140 lb 12.8 oz)  Estimated body mass index is 25.75 kg/m  as calculated from the following:    Height as of this encounter: 1.575 m (5' 2\").    Weight as of this encounter: 63.9 kg (140 lb 12.8 oz).       I/O last 3 completed shifts:  In: 820 [P.O.:820]  Out: 1190 [Urine:1200]     Body mass index is 25.75 kg/m .    General Appearance:  Alert, cooperative, no distress   Head:  Normocephalic, atraumatic   Eyes:  PERRL    Throat:  mucosa; moist   Neck: No JVD, thyromegaly   Lungs:   Clear to auscultation bilaterally, respirations unlabored   Chest Wall:  No tenderness or deformity   Heart:  Regular rate and rhythm, S1, S2 normal,no murmur   Abdomen:   Soft, non tender, mildly distended, bowel sounds present, no guarding or rigidity she has right lower quadrant drain in her abdomen with a serosanguineous fluid.   Extremities: No edema, no joint swelling   Skin: Skin color, texture, turgor normal, no rashes or lesions   Neurologic: Alert and oriented X 3, Moves all 4 extremities       Medications:   Personally Reviewed.    acetaminophen  650 mg Oral Once     amiodarone  200 mg Oral BID     apixaban ANTICOAGULANT  5 mg Oral BID     aspirin  81 mg Oral Daily     carvedilol  6.25 mg Oral BID w/meals     diltiazem ER COATED BEADS  180 mg Oral Daily    And     diltiazem ER COATED BEADS  120 mg Oral Daily     piperacillin-tazobactam  3.375 g Intravenous Q8H     potassium chloride  20 mEq Oral TID     sodium chloride (PF)  10 mL Irrigation Q8H     sodium chloride (PF)  3 mL Intracatheter Q8H     valsartan  80 mg Oral QPM       Data reviewed today: I personally reviewed all new medications, labs, " imaging/diagnostics reports over the past 24 hours. Pertinent findings include    Labs:  Most Recent 3 CBC's:  Recent Labs   Lab Test 08/15/21  0556 08/14/21  0555 08/13/21  0526   WBC 9.6 10.6 9.6   HGB 9.4* 9.7* 9.5*   MCV 77* 77* 76*   * 475* 492*     Most Recent 3 BMP's:  Recent Labs   Lab Test 08/15/21  0556 08/14/21  0939 08/13/21  0526 08/11/21  0514   NA  --  130* 130* 134*   POTASSIUM 4.1 3.6 3.7 3.2*   CHLORIDE  --  97* 96* 105   CO2  --  23 28 23   BUN  --  9 8 9   CR  --  0.64 0.52* 0.46*   ANIONGAP  --  10 6 6   ABIGAIL  --  7.7* 7.7* 7.7*   GLC  --  182* 105 106     Most Recent 6 Bacteria Isolates From Any Culture (See EPIC Reports for Culture Details):No lab results found.      Jennifer Valenzuela MD  Hospitalist  [facility]

## 2021-08-16 NOTE — PROGRESS NOTES
Care Management Follow Up    Length of Stay (days): 10    Expected Discharge Date: 08/18/2021  Expected Time of Departure: TBD .  Concerns to be Addressed:   CT Scan progression with consults.     Patient plan of care discussed at interdisciplinary rounds: Yes     Anticipated Discharge Disposition:  [facility]      Anticipated Discharge Services:  TCU   Anticipated Discharge DME:  TBD     Patient/family educated on Medicare website which has current facility and service quality ratings:  Yes   Education Provided on the Discharge Plan:  Yes   Patient/Family in Agreement with the Plan:  Yes     Referrals Placed by CM/SW:  TCU's of choice   Private pay costs discussed: Yes, possible transportation cost.     Additional Information:  SWCM recieved call from Pt's daughter Krista stating Pt may be ready for d//c today to [facility].  SWCM confirmed with Lilly in Admission that they take Pt after 2:30.  SW set up w/c ride for 3:00 PM per request of Krista and is aware of potential costs. .  SW was again called by daughter and Pt is not discharging today.  SWCM confirmed with nurse.  SWCM cancelled ride.  SWCM left  for admissions with Lilly.       YADI Hassan

## 2021-08-16 NOTE — CONSULTS
HPI  89 year old year old female who I have been consulted by No ref. provider found for evaluation of Chills  You 1-year-old female who we are reconsulted for evaluation of ileus versus small bowel obstruction.  She was admitted for diverticular versus ovarian abscess and has undergone drain placements with inadvertent removal of 1 drain.  A lower pelvic drain persists.  She is doing relatively well but has not had any bowel movements last 3 days.  She is passing small amounts of flatus and denies any nausea or emesis.  She underwent CT imaging today for evaluation of her abdominal distention was noted to have possible ileus versus small bowel obstruction.  Currently she has no abdominal pain and wants to leave the hospital.      Allergies:Amlodipine, Anastrozole, Lisinopril, Losartan, Sulfa drugs, Thiazide-type diuretics, Furosemide, Hydrochlorothiazide, Metolazone, Morphine, and No clinical screening - see comments    Past Medical History:   Diagnosis Date     Breast cancer (H)     left, s/p lumpectomy     CAD (coronary artery disease)     MI 2010 s/p stent     Diastolic dysfunction      Diverticulosis      HLD (hyperlipidemia)      HTN (hypertension)      Hx SBO      Right ovarian cyst      STEMI (ST elevation myocardial infarction) (H) 10/28/2010    inferior     Urinary incontinence        Past Surgical History:   Procedure Laterality Date     ANGIOPLASTY  2010     ANKLE FRACTURE SURGERY Right 1995     APPENDECTOMY       APPENDECTOMY  1975     AS CORNEAL TRANSPLANT,LAMELLAR       CATARACT EXTRACTION Bilateral 2007     CHOLECYSTECTOMY       CHOLECYSTECTOMY  1975     CORNEAL TRANSPLANT Bilateral 2011     FRACTURE TX, ANKLE RT/LT       LAPAROSCOPIC SALPINGO-OOPHORECTOMY Bilateral 3/26/2021    Procedure: Laparoscopic BSO. Cystoscopy;  Surgeon: Juanjose Camejo MD;  Location: [facility] OR     LIGATE VEIN       LUMPECTOMY BREAST Left      LUMPECTOMY BREAST Left 03/10/2017     LA PATIENT HAS A CORONARY ARTERY STENT   2010      BREAST BIOPSY LT       VEIN SURGERY Right 1980         CURRENT MEDS:    Current Facility-Administered Medications:      acetaminophen (TYLENOL) tablet 650 mg, 650 mg, Oral, Q4H PRN, Rudolph Berg MD, 650 mg at 08/06/21 0409     acetaminophen (TYLENOL) tablet 650 mg, 650 mg, Oral, Once, Myron Alcantar MD     amiodarone (PACERONE) tablet 200 mg, 200 mg, Oral, BID, Mariama Pedraza MD, 200 mg at 08/16/21 0918     apixaban ANTICOAGULANT (ELIQUIS) tablet 5 mg, 5 mg, Oral, BID, Jennifer Valenzuela MD, 5 mg at 08/16/21 1008     aspirin EC tablet 81 mg, 81 mg, Oral, Daily, Carlos Felipe MD, 81 mg at 08/16/21 0918     carvedilol (COREG) tablet 6.25 mg, 6.25 mg, Oral, BID w/meals, Myron Alcantar MD, 6.25 mg at 08/16/21 0918     diltiazem ER COATED BEADS (CARDIZEM CD/CARTIA XT) 24 hr capsule 180 mg, 180 mg, Oral, Daily, 180 mg at 08/16/21 1008 **AND** diltiazem ER COATED BEADS (CARDIZEM CD/CARTIA XT) 24 hr capsule 120 mg, 120 mg, Oral, Daily, Carlos Felipe MD, 120 mg at 08/16/21 1008     lidocaine (LMX4) cream, , Topical, Q1H PRN, Carlos Felipe MD     lidocaine 1 % 0.1-1 mL, 0.1-1 mL, Other, Q1H PRN, Carlos Felipe MD     magnesium hydroxide (MILK OF MAGNESIA) suspension 30 mL, 30 mL, Oral, Daily PRN, Carlos Felipe MD     melatonin tablet 1 mg, 1 mg, Oral, At Bedtime PRN, Carlos Felipe MD     metoprolol (LOPRESSOR) injection 5 mg, 5 mg, Intravenous, Q5 Min PRN, Carlos Felipe MD, 5 mg at 08/06/21 1051     micafungin (MYCAMINE) 100 mg in sodium chloride 0.9 % 100 mL intermittent infusion, 100 mg, Intravenous, Q24H, Yvonne Morrell MD     ondansetron (ZOFRAN-ODT) ODT tab 4 mg, 4 mg, Oral, Q6H PRN **OR** ondansetron (ZOFRAN) injection 4 mg, 4 mg, Intravenous, Q6H PRN, Carlos Felipe MD, 4 mg at 08/08/21 0922     [COMPLETED] piperacillin-tazobactam (ZOSYN) 3.375 g vial to attach to  mL bag, 3.375 g, Intravenous, Once, 3.375 g at 08/06/21 1509 **FOLLOWED BY** piperacillin-tazobactam (ZOSYN)  "3.375 g vial to attach to  mL bag, 3.375 g, Intravenous, Q8H, Carlos Felipe MD, 3.375 g at 08/16/21 1248     polyethylene glycol (MIRALAX) Packet 17 g, 17 g, Oral, Daily PRN, Carlos Felipe MD     potassium chloride ER (KLOR-CON M) CR tablet 20 mEq, 20 mEq, Oral, TID, Diony Ledezma MD, 20 mEq at 08/16/21 1301     prochlorperazine (COMPAZINE) injection 5 mg, 5 mg, Intravenous, Q6H PRN, Carlos Felipe MD, 5 mg at 08/07/21 1015     sodium chloride (PF) 0.9% PF flush 10 mL, 10 mL, Irrigation, Q8H, Darvin Vásquez MD, 10 mL at 08/16/21 1300     sodium chloride (PF) 0.9% PF flush 3 mL, 3 mL, Intracatheter, Q8H, Carlos Felipe MD, 3 mL at 08/16/21 1250     sodium chloride (PF) 0.9% PF flush 3 mL, 3 mL, Intracatheter, q1 min prn, Carlos Felipe MD     valsartan (DIOVAN) tablet 80 mg, 80 mg, Oral, QPM, Carlos Felipe MD, 80 mg at 08/15/21 2246    FAMHX-reviewed and noncontributory     reports that she has never smoked. She has never used smokeless tobacco. She reports current alcohol use of about 1.0 standard drinks of alcohol per week. She reports that she does not use drugs.    Review of Systems:  The 12 point review of systems  is within normal limits except for as mentioned above in the HPI.  General ROS: No complaints or constitutional symptoms  Ophthalmic ROS: No complaints of visual changes  Skin: No complaints or symptoms   Endocrine: No complaints or symptoms  Hematologic/Lymphatic: No symptoms or complaints  Psychiatric: No symptoms or complaints  Respiratory ROS: no cough, shortness of breath, or wheezing  Cardiovascular ROS: no chest pain or dyspnea on exertion  Gastrointestinal ROS: As per HPI  Genito-Urinary ROS: no dysuria, trouble voiding, or hematuria  Musculoskeletal ROS: no joint or muscle pain  Neurological ROS: no TIA or stroke symptoms      EXAM:  BP (!) 152/69 (BP Location: Left arm)   Pulse 74   Temp 98.3  F (36.8  C) (Oral)   Resp 20   Ht 1.575 m (5' 2\")   Wt 63.9 kg " (140 lb 12.8 oz)   SpO2 93%   BMI 25.75 kg/m    GENERAL: Well developed female, No acute distress, pleasant and conversant   EYES: Pupils equal, round and reactive, no scleral icterus  ABDOMEN: Soft, mild distention, mild tenderness to deep palpation in the left lower and right mid abdominal region  SKIN: Pink, warm and dry, no obvious rashes or lesions   NEURO:No focal deficits, ambulatory  MUSCULOSKELETAL:No obvious deformities, no swelling, normal appearing      LABS:  Lab Results   Component Value Date    WBC 10.5 08/16/2021    HGB 9.6 08/16/2021    HCT 29.8 08/16/2021    MCV 77 08/16/2021     08/16/2021     INR/Prothrombin Time  Recent Labs   Lab 08/14/21  0939   *   CO2 23   BUN 9     Lab Results   Component Value Date    ALT 21 08/13/2021    AST 24 08/13/2021    ALKPHOS 81 08/13/2021       IMAGES:   Relevant images were reviewed and discussed with the patient.  Notable findings were: CT scan images reviewed and demonstrate mildly dilated loops of small intestine with persistent right lower quadrant pelvic fluid collection    Assessment/Plan:   Angelica Toro is a 89 year old female with either an ileus or bowel obstruction.  I reviewed the CT images and there does appear to be a persistent right lower quadrant fluid collection which appears to communicate with the current pigtail drain.  This fluid collection can certainly create an ileus and explain her symptomatology.  There is no surgical intervention at this time and she does not appear toxic.  I have ordered a small bowel follow-through for which she can take the Gastrografin orally without an NG tube.  This was discussed with the nursing team and the plan will be for small bowel follow-through to see if we can motivate her bowels to function properly.  -we will follow      John Rivero D.O. FACS  [phone]

## 2021-08-16 NOTE — PLAN OF CARE
Problem: Pain Acute  Goal: Acceptable Pain Control and Functional Ability  Outcome: No Change   Pt has complained of no pain this shift.      Problem: Fall Injury Risk  Goal: Absence of Fall and Fall-Related Injury  Outcome: No Change   Pt is assist x 2. On alarms. Can call appropriately and make needs known. Call light within reach.      Problem: UTI (Urinary Tract Infection)  Goal: Improved Infection Symptoms  Outcome: No Change   Pt has incontinence. Purewick utilized while in bed to maintain skin integrity. No other reported symptoms of UTI reported per pt. ID consult added micafungin to cover candida found in abscess fluid.       Problem: Adult Inpatient Plan of Care  Goal: Plan of Care Review  Outcome: No Change   .followed by ID, OB, Surg, PT/OT. Pt had CT. Orders placed for several consult. Nurse spoke with IR and they do not want to move forward with placing another drain. They informed nurse to vigorously irrigate GUANAKO with 20 ml NS. Thiswas done.    ID consult added micafungin to cover candida found in abscess fluid. Surg consulted. No surgery needed. Surgery decided against the NG placement and wants pt to drink gastrografin orally. Nurse ordered medication from xray. K protocol. K 4.0 with a recheck in the morning. Pt had ride scheduled for 1500 today and that was canceled due to CT results. Anticipated discharge to a TCU

## 2021-08-16 NOTE — PLAN OF CARE
Problem: Adult Inpatient Plan of Care  Goal: Plan of Care Review  Outcome: No Change   Patient is alert and oriented, patient is an assist of 2 and has some generalized weakness. Patient denies having any pain and no burning or itching with urination. Patient can be demanding at times. Drain intact and has yellow/clear drainage.

## 2021-08-16 NOTE — PROGRESS NOTES
"INFECTIOUS DISEASE FOLLOW UP NOTE    Date: 2021   CHIEF COMPLAINT:   Chief Complaint   Patient presents with     Chills        ASSESSMENT:  89F with carcinoid tumor  Hx laparoscopy.  Suspected perforated uterus with pelvic abscess: cultures with proteus and anaerobes-this drain has since been removed. Also had abdominal abscess drained, growing e coli, pseudomonas, and candida (after several days of antibiotics). Improving on pip-tazo IV  Repeat CT  with residual pelvic fluid collection ?drain replacement    PLAN:  - continue pip-tazo IV while in the hospital  - add micafungin to cover the candida. Unclear significance (isolated after several days of IV antibiotics). Discussed with patient, family and will treat with micafungin while in the hospital. PO option for discharge would be fluconazole which has significant drug interactions with her medications (amiodarone, dilt, etc), so will stop antifugnal coverage at hospital discharge. Risk/benefits discussed with patient/family  - would plan levofloxacin/flagyl for discharge  - discussed with patient and family  - ID will follow, thanks    Yvonne Morrell MD  [facility]   On-Call: [phone]     ______________________________________________________________________    SUBJECTIVE / INTERVAL HISTORY: feeling ok today. Doesn't know how belly is feeling, mixed answers on whether she's tolerating PO well.     ROS: All other systems negative except as listed above.    SH/FH/Habits/PMH reviewed and unchanged.    OBJECTIVE:  BP (!) 152/69 (BP Location: Left arm)   Pulse 74   Temp 98.3  F (36.8  C) (Oral)   Resp 20   Ht 1.575 m (5' 2\")   Wt 63.9 kg (140 lb 12.8 oz)   SpO2 93%   BMI 25.75 kg/m       Resp: 20      Vital Signs  Temp: 98.3  F (36.8  C)  Temp src: Oral  Resp: 20  Pulse: 74  Pulse Rate Source: Monitor  BP: (!) 152/69  BP Location: Left arm    Temp (24hrs), Av.2  F (36.8  C), Min:98.1  F (36.7  C), Max:98.3  F (36.8 "  C)      GEN: No acute distress.    RESPIRATORY:  Normal breathing pattern. Clear to auscultation bilaterally  CARDIOVASCULAR:  Regular rate and rhythm. No murmur, click, gallop or rub.   ABDOMEN:  Soft, normal bowel sounds, non-tender, RLQ drain with purulent fluid  EXTREMITIES: No edema.  SKIN/HAIR/NAILS:  No rashes  IV: peripheral IV        Antibiotics:  Pip-tazo IV 8/6-    Pertinent labs:  No results found for: CRP   CBC RESULTS:   Recent Labs   Lab Test 08/16/21  0458   WBC 10.5   RBC 3.86   HGB 9.6*   HCT 29.8*   MCV 77*   MCH 24.9*   MCHC 32.2   RDW 15.3*   *      Last Comprehensive Metabolic Panel:  Sodium   Date Value Ref Range Status   08/14/2021 130 (L) 136 - 145 mmol/L Final     Potassium   Date Value Ref Range Status   08/16/2021 4.0 3.5 - 5.0 mmol/L Final     Chloride   Date Value Ref Range Status   08/14/2021 97 (L) 98 - 107 mmol/L Final     Carbon Dioxide (CO2)   Date Value Ref Range Status   08/14/2021 23 22 - 31 mmol/L Final     Anion Gap   Date Value Ref Range Status   08/14/2021 10 5 - 18 mmol/L Final     Glucose   Date Value Ref Range Status   08/14/2021 182 (H) 70 - 125 mg/dL Final     Urea Nitrogen   Date Value Ref Range Status   08/14/2021 9 8 - 28 mg/dL Final     Creatinine   Date Value Ref Range Status   08/14/2021 0.64 0.60 - 1.10 mg/dL Final     GFR Estimate   Date Value Ref Range Status   08/14/2021 78 >60 mL/min/1.73m2 Final     Comment:     As of July 11, 2021, eGFR is calculated by the CKD-EPI creatinine equation, without race adjustment. eGFR can be influenced by muscle mass, exercise, and diet. The reported eGFR is an estimation only and is only applicable if the renal function is stable.     Calcium   Date Value Ref Range Status   08/14/2021 7.7 (L) 8.5 - 10.5 mg/dL Final        MICROBIOLOGY DATA:  Personally reviewed.    8/7 pelvic abscess culture proteus, clostridium ramosum, b frag  8/11 abdominal abscess: e coli, pseudomonas, candida    Escherichia coli Pseudomonas  aeruginosa       JIMMY JIMMY     Ampicillin <=4 ug/mL Susceptible       Ampicillin/ Sulbactam 4/2 ug/mL Susceptible       Aztreonam   4 ug/mL Susceptible     Cefazolin 2 ug/mL Susceptible       Cefepime <=1 ug/mL Susceptible 2 ug/mL Susceptible     Ceftazidime   <=2 ug/mL Susceptible     Ceftriaxone <=1 ug/mL Susceptible       Ciprofloxacin <=0.25 ug/mL Susceptible <=0.25 ug/mL Susceptible     Gentamicin <=2 ug/mL Susceptible <=2 ug/mL Susceptible     Levofloxacin <=0.5 ug/mL Susceptible <=0.5 ug/mL Susceptible     Meropenem <=0.5 ug/mL Susceptible <=0.5 ug/mL Susceptible     Piperacillin/Tazobactam <=2/4 ug/mL Susceptible 4/4 ug/mL Susceptible     Tetracycline <=2 ug/mL Susceptible       Tobramycin <=2 ug/mL Susceptible <=2 ug/mL Susceptible     Trimethoprim/Sulfa <=0.5 ug/mL Susceptible                 Proteus mirabilis       JIMMY     Ampicillin <=4 ug/mL Susceptible     Ampicillin/ Sulbactam <=1/0.5 ug/mL Susceptible     Cefepime <=1 ug/mL Susceptible     Ceftriaxone <=1 ug/mL Susceptible     Ciprofloxacin <=0.25 ug/mL Susceptible     Gentamicin <=2 ug/mL Susceptible     Levofloxacin <=0.5 ug/mL Susceptible     Piperacillin/Tazobactam <=2/4 ug/mL Susceptible     Tetracycline >8 ug/mL Resistant     Tobramycin <=2 ug/mL Susceptible     Trimethoprim/Sulfa <=0.5 ug/mL Susceptible          RADIOLOGY:  Personally Reviewed.  Recent Results (from the past 24 hour(s))   CT Abdomen Pelvis w Contrast    Narrative    EXAM: CT ABDOMEN PELVIS W CONTRAST  LOCATION: [facility]  DATE/TIME: 8/16/2021 9:35 AM    INDICATION: Drained uterine abscess. More recent drain placement for intraperitoneal free fluid. Follow-up.   COMPARISON: Procedure CT dated 08/11/2021. CT abdomen pelvis dated 08/10/2021.   TECHNIQUE: CT scan of the abdomen and pelvis was performed following injection of IV contrast. Multiplanar reformats were obtained. Dose reduction techniques were used.  CONTRAST: ISOVUE 370 100 mL    FINDINGS:    LOWER CHEST: Small pleural effusions with atelectasis. Coronary artery disease.     HEPATOBILIARY: Small circumscribed hypoattenuating hepatic lesions have not changed. Status post cholecystectomy.     PANCREAS: Normal.    SPLEEN: Normal.    ADRENAL GLANDS: Normal.    KIDNEYS/BLADDER: Simple renal cysts do not require follow-up. A 1.6 x 1.2 cm lesion at the posterior middle third of the right kidney is 30 Hounsfield units.  There is mild right hydronephrosis and hydroureter, presumably related to the right pelvic   process. The urinary bladder is distended.    BOWEL: Normal stomach. Multiple small bowel loops are distended with a maximum diameter of 4.3 cm. Air-fluid levels are present. No definite transition point identified. The colon is normal in appearance.  No pneumatosis intestinalis.    LYMPH NODES: Multiple prominent and enlarged mesenteric lymph nodes are present.     VASCULATURE: Atherosclerotic disease. 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  No portal venous gas.    PELVIC ORGANS: The uterine catheter has been removed. There is a 4.2 x 3.1 x 1.8 cm collection with air locules and peripheral enhancement in this region (series 3 image 170).     OTHER: There is free fluid with peripheral enhancement and internal air locules in the right pelvis. A pigtail catheter terminates within this fluid; the fluid has decreased in volume from the comparison study.    MUSCULOSKELETAL: Degenerative change in the spine.       Impression    IMPRESSION:   1.  The uterine drain is no longer present. There is a 4 cm fluid collection in this region which contains up to 10 mL of fluid; aspiration or new drain placement should be considered.  2.  Free fluid in the pelvis has decreased though has not yet completely resolved with placement of a pigtail catheter.  3.  Small bowel distention has increased, and a small bowel obstruction is suspected. Paralytic ileus  could have a similar appearance. Recommend further evaluation.    NOTE: ABNORMAL REPORT    THE DICTATION ABOVE DESCRIBES AN ABNORMALITY FOR WHICH FOLLOW-UP IS NEEDED.        Principal Problem:    Acute cystitis without hematuria  Active Problems:    Essential hypertension    Hyperlipidemia    Chills    Chronic hyponatremia    Atrial fibrillation with rapid ventricular response (H)    Abdominal pain, generalized    Perforated sigmoid colon (H)    Pelvic abscess in female    Sepsis without acute organ dysfunction (H)

## 2021-08-16 NOTE — PLAN OF CARE
Problem: Adult Inpatient Plan of Care  Goal: Plan of Care Review  8/16/2021 0426 by Niesha Montejo, RN  Outcome: No Change  8/16/2021 0424 by Niesha Montejo, RN  Outcome: No Change   Patient is alert and oriented, patient is an assist of 2 and has some generalized weakness. Patient denies having any pain and no burning or itching with urination. Patient can be demanding at times. Drain intact and has yellow/clear drainage. Patient could not sleep without purewick so purewick was placed.

## 2021-08-16 NOTE — PROGRESS NOTES
"  Interventional Radiology - Chart Check  Inpatient - [facility]: Interventional Radiology   [phone]  8/16/2021    S:  Angelica Toro is a 89 year old female admitted on 8/5/2021. She has a history of bilateral ovarian masses, CAD, HTN, HLD, and chronic hyponatremia presents with fever and chills and admitted for acute cystitis and acute perforated sigmoid diverticulitis.  Dx with abdominal/intrauterine abscesses presumably from diverticulitis.    She is s/p CT guided RIGHT transgluteal drain placement 8/7/2021, this drain unfortunately broke and was removed 8/13.    She is s/p CT guided RIGHT lower quadrant drain placement 8/11/2021    Culture:    Drain #1:   4+ Clostridium ramosumAbnormal     Generally susceptible to Clindamycin, Metronidazole, Meropenem, Piperacillin/Tazobactam and Chloramphenicol.   4+ Bacteroides fragilisAbnormal     Generally susceptible to Amoxicillin/Clavulanic acid, Cefotetan, Metronidazole, Piperacillin/Tazobactam, Meropenem and Chloramphenicol.         Normal Marga with       4+ Proteus mirabilisAbnormal              Drain #2:    1+ Escherichia coliAbnormal        1+ Pseudomonas aeruginosaAbnormal        1+ Candida albicansAbnormal            Antibiotic: Zosyn  Flushing: 10mL BID    O:  BP (!) 152/69 (BP Location: Left arm)   Pulse 74   Temp 98.3  F (36.8  C) (Oral)   Resp 20   Ht 1.575 m (5' 2\")   Wt 63.9 kg (140 lb 12.8 oz)   SpO2 93%   BMI 25.75 kg/m        IMAGING:  EXAM: CT ABDOMEN PELVIS W CONTRAST  LOCATION: [facility]  DATE/TIME: 8/16/2021 9:35 AM     INDICATION: Drained uterine abscess. More recent drain placement for intraperitoneal free fluid. Follow-up.   COMPARISON: Procedure CT dated 08/11/2021. CT abdomen pelvis dated 08/10/2021.   TECHNIQUE: CT scan of the abdomen and pelvis was performed following injection of IV contrast. Multiplanar reformats were obtained. Dose reduction techniques were used.  CONTRAST: ISOVUE 370 " 100 mL     FINDINGS:   LOWER CHEST: Small pleural effusions with atelectasis. Coronary artery disease.      HEPATOBILIARY: Small circumscribed hypoattenuating hepatic lesions have not changed. Status post cholecystectomy.      PANCREAS: Normal.     SPLEEN: Normal.     ADRENAL GLANDS: Normal.     KIDNEYS/BLADDER: Simple renal cysts do not require follow-up. A 1.6 x 1.2 cm lesion at the posterior middle third of the right kidney is 30 Hounsfield units.  There is mild right hydronephrosis and hydroureter, presumably related to the right pelvic   process. The urinary bladder is distended.     BOWEL: Normal stomach. Multiple small bowel loops are distended with a maximum diameter of 4.3 cm. Air-fluid levels are present. No definite transition point identified. The colon is normal in appearance.  No pneumatosis intestinalis.     LYMPH NODES: Multiple prominent and enlarged mesenteric lymph nodes are present.      VASCULATURE: Atherosclerotic disease. 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  No portal venous gas.     PELVIC ORGANS: The uterine catheter has been removed. There is a 4.2 x 3.1 x 1.8 cm collection with air locules and peripheral enhancement in this region (series 3 image 170).      OTHER: There is free fluid with peripheral enhancement and internal air locules in the right pelvis. A pigtail catheter terminates within this fluid; the fluid has decreased in volume from the comparison study.     MUSCULOSKELETAL: Degenerative change in the spine.                                                                       IMPRESSION:   1.  The uterine drain is no longer present. There is a 4 cm fluid collection in this region which contains up to 10 mL of fluid; aspiration or new drain placement should be considered.  2.  Free fluid in the pelvis has decreased though has not yet completely resolved with placement of a pigtail catheter.  3.  Small bowel  distention has increased, and a small bowel obstruction is suspected. Paralytic ileus     LABS:  CBC RESULTS: Recent Labs   Lab Test 08/16/21  0458   WBC 10.5   RBC 3.86   HGB 9.6*   HCT 29.8*   MCV 77*   MCH 24.9*   MCHC 32.2   RDW 15.3*   *        Drain Outputs (in mL):      A:  89 yo F with hx of acute perforated sigmoid diverticulitis.  Dx with abdominal/intrauterine abscesses presumably from diverticulitis.     - s/p drain placement RIGHT transgluteal drain placement 8/7/2021, drain snapped 8/13 and is no longer functional per bedside staff  - s/p drain placement RIGHT lower quadrant drain placement 8/11/2021  - Cultures positive  - On abx- Zosyn  - CT completed this morning     P:    - Discussed case and reviewed imaging with Dr. Franks, who is at [facility] today  - Does not recommend moving forward with aspiration of or placement of a new tube at this point in time.    - Continue drain cares of RLQ drain, but recommend aggressive irrigation today with 20mL NS. Ordered and called to bedside RAGHAV Henderson.    - Anticipate CT and abscessogram in coming days to evaluate RLQ drain and if discharged, need for drain replacement in transgluteal abscess.  Called [facility] and had evaluation moved to Wednesday.   - Discharge drain orders in place.      MARISOL BOLIVAR NP  Interventional Radiology  [phone]

## 2021-08-16 NOTE — PROGRESS NOTES
Surgeon consulted with pt and NG discontinued. Pt will do gastrografin orally. Xray called to order med and they will bring it up.

## 2021-08-17 NOTE — PROGRESS NOTES
[facility] Medicine PROGRESS NOTE      Identification/Summary:   Angelica Toro is a 89 year old female admitted on 8/5/2021. 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 Her course was complicated by new-onset atrial fibrillation with RVR for which cardiology was consulted and amiodarone and heparin infusions started and they have now transitioned her to oral amiodarone and apixaban. Acute onset of ongoing abdominal pain, without rigidity or signs of peritonitis, CT scan demonstrated acute perforated sigmoid diverticulitis with 2 pelvic abscesses. The largest measures 6.5 x 5.4 cm. started on zosyn and underwent IR drained abscess 8/7. She is slowly feeling better. Surgery starting her on clear liquids on 8/9.  Repeat CT on 8/10 indicates she needs an additional drain placed, this was done on 8/11 without difficulty. General surgery signed off, because they feel this pelvic/abdominal abscess is more likely a GYN issue, suspected perforated uterus. Initial drain place don 8/7, came out accidentally 8/13 and IR recommended for it to be removed.  ID consulted and recommended changing her to Levaquin, Augmentin at discharge.  Repeat CT scan abdomen pelvis today showed persistent abscess in uterine area abt 4 cm, small bowel dilation concern for ileus vs SBO.  Will have surgery, gynecology, ID revisit with her.  She is tolerating diet.  Likely will need another drain by IR.  She will be here for at least 1-2 days.  TCU at discharge planned.     Assessment and Plan:     Sepsis secondary to:   Pelvic Abscesses  ?Uterine Abscess, suspected perforated uterus  History of ovarian masses s/p removal in March found to be carcinoid tumor  CT scan on 8/6 demonstrated above acute findings, and general surgery and IR consulted, and subsequently IR  "placed drain on 8/7, 8/11  Gynecology recommended ongoing conservative medical management without surgery as tolerated, and recommended IR drain placement \"without regard to possible uterine origin\"  Short-term had broadened IV piperacillin-tazobactam and vancomycin, discontinue vancomycin and continue with piperacillin tazobactam.  Cultures so far with Proteus, Clostridium, bacteroids  Appreciate ID input  Recommending palliative antibiotic treatment such as Augmentin and Levaquin.  Initial drain got pulled out accidentally on 8/13.  Was removed per IR recommendation.   repeat CT scan of the abdomen today reveals persistent abscess 4 cm, around the drain area, persistent  free fluid in the pelvis, small bowel distention is increased concern for obstruction versus ileus.  Patient is tolerating diet and had bowel movement 2 days ago. Passing flatus   IR reviewed her CT scan today, they are recommending CT abscessogram in the near future.   Have gynecology, ID, surgery revisit with her.    Atrial Fibrillation with RVR  Converted to NSR  Started on amiodarone, continue but now switch to oral  Continue Coreg and Diltiazem   IV heparin has been stopped  Has been followed by cardiology  On apixaban.   in sinus rhythm    Hypokalemia  Replace per protocol       UTI  UC grew Ecoli  On piperacillin-tazobactam, continue      Chronic Hyponatremia  Na was 128 in 3/10/2021, and most recently was 124 on 7/14/2021 a month ago  Monitor.    Severe protein-calorie malnutrition with albumin of 2.4.  Plan: Encourage healthy eating and Ensure clear supplements between meals     HTN  CAD  Order home medications.     HLD  Order home statin.       Diet: Snacks/Supplements Adult: Ensure Enlive; Between Meals  NPO for Medical/Clinical Reasons Except for: Meds  DVT Prophylaxis on apixaban  Code Status: No CPR- Do NOT Intubate    Anticipated possible discharge in 1-2 days once ileus is resolved are met.    Interval History/Subjective:   No " "nausea vomiting.  Abdominal pain is controlled.  No shortness of breath, chest pain.  Last stool was 2 days ago.  She is able to pass gas.     Physical Exam/Objective:  Vitals I/O   Vital signs:  Temp: 98.2  F (36.8  C) Temp src: Oral BP: 136/66 Pulse: 83   Resp: 18 SpO2: 96 % O2 Device: None (Room air) Oxygen Delivery: 3 LPM Height: 157.5 cm (5' 2\") Weight: 63.9 kg (140 lb 12.8 oz)  Estimated body mass index is 25.75 kg/m  as calculated from the following:    Height as of this encounter: 1.575 m (5' 2\").    Weight as of this encounter: 63.9 kg (140 lb 12.8 oz).       I/O last 3 completed shifts:  In: 600 [P.O.:600]  Out: 880 [Urine:830; Drains:50]     Body mass index is 25.75 kg/m .    General Appearance:  Alert, cooperative, no distress   Head:  Normocephalic, atraumatic   Eyes:  PERRL    Throat:  mucosa; moist   Neck: No JVD, thyromegaly   Lungs:   Clear to auscultation bilaterally, respirations unlabored   Chest Wall:  No tenderness or deformity   Heart:  Regular rate and rhythm, S1, S2 normal,no murmur   Abdomen:   Soft, non tender, mildly distended, bowel sounds present, no guarding or rigidity she has right lower quadrant drain in her abdomen with a serosanguineous fluid.   Extremities: No edema, no joint swelling   Skin: Skin color, texture, turgor normal, no rashes or lesions   Neurologic: Alert and oriented X 3, Moves all 4 extremities       Medications:   Personally Reviewed.    acetaminophen  650 mg Oral Once     amiodarone  200 mg Oral BID     apixaban ANTICOAGULANT  5 mg Oral BID     aspirin  81 mg Oral Daily     carvedilol  6.25 mg Oral BID w/meals     diltiazem ER COATED BEADS  180 mg Oral Daily    And     diltiazem ER COATED BEADS  120 mg Oral Daily     micafungin  100 mg Intravenous Q24H     piperacillin-tazobactam  3.375 g Intravenous Q8H     potassium chloride  20 mEq Oral TID     sodium chloride (PF)  10 mL Irrigation Q8H     sodium chloride (PF)  3 mL Intracatheter Q8H     valsartan  80 mg Oral " QPM       Data reviewed today: I personally reviewed all new medications, labs, imaging/diagnostics reports over the past 24 hours. Pertinent findings include    Labs:  Most Recent 3 CBC's:  Recent Labs   Lab Test 08/16/21  0458 08/15/21  0556 08/14/21  0555   WBC 10.5 9.6 10.6   HGB 9.6* 9.4* 9.7*   MCV 77* 77* 77*   * 535* 475*     Most Recent 3 BMP's:  Recent Labs   Lab Test 08/16/21  0458 08/15/21  0556 08/14/21  0939 08/13/21  0526 08/11/21  0514   NA  --   --  130* 130* 134*   POTASSIUM 4.0 4.1 3.6 3.7 3.2*   CHLORIDE  --   --  97* 96* 105   CO2  --   --  23 28 23   BUN  --   --  9 8 9   CR  --   --  0.64 0.52* 0.46*   ANIONGAP  --   --  10 6 6   ABIGAIL  --   --  7.7* 7.7* 7.7*   GLC  --   --  182* 105 106     Most Recent 6 Bacteria Isolates From Any Culture (See EPIC Reports for Culture Details):No lab results found.      Jennifer Valenzuela MD  Hospitalist  [facility]

## 2021-08-17 NOTE — PROGRESS NOTES
General Surgery Progress Note  Hospital Day # 11     Subjective:   CC:perforated diverticulitis with questionable ileus  Status: Gastrografin has worked successfully with multiple bowel movements no pain    Vitals:    08/16/21 0831 08/16/21 1619 08/16/21 2315 08/17/21 0753   BP: (!) 152/69 137/63 136/66 (!) 140/64   BP Location: Left arm Left arm Left arm Left arm   Pulse: 74 69 83 80   Resp: 20 18 18 16   Temp: 98.3  F (36.8  C) 98  F (36.7  C) 98.2  F (36.8  C) 98  F (36.7  C)   TempSrc: Oral Oral Oral Oral   SpO2: 93% 94% 96% 91%   Weight:       Height:           Physical Exam:  General: NAD, pleasant  ABD: Soft, mild distention, nontender, GUANAKO drain with purulent output  EXT:no CCE    Recent Labs   Lab 08/17/21  0753   WBC 12.6*   HGB 10.3*   HCT 32.2*   *       Recent Labs   Lab 08/17/21  0753 08/13/21  0526   * 130*   CO2 24 28   BUN 7* 8   ALBUMIN  --  1.4*   ALKPHOS  --  81   ALT  --  21   AST  --  24       Assessment: Resolution of questionable ileus    Plan: Recommend diet as tolerated  -GUANAKO drain remain in place with IR interrogation as an outpatient  -She can continue with normal activities and will discharge to-TCU at some point soon  -Surgery will sign off    John Rivero DO [facility]  [phone]

## 2021-08-17 NOTE — PROVIDER NOTIFICATION
Gastrografin results now available. having loose stools, no emesis. Asked to please advise.   No new orders placed. Ok to let pt have a few sips of water. Nothing to eat.

## 2021-08-17 NOTE — PROGRESS NOTES
[facility] MEDICINE PROGRESS NOTE      Code Status: No CPR- Do NOT Intubate       Identification/Summary:   89 year old female admitted on 8/5/2021. 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  8/5 admitted with fever and chills with acute cystitis.  Acute onset of ongoing abdominal pain.  CT scan demonstrated acute perforated sigmoid diverticulitis with 2 pelvic abscesses. The largest measures 6.5 x 5.4 cm. Started on zosyn and 8/7 underwent IR abscess drain.  8/9 surgery starting on clear liquids.  8/10 repeat CT indicated need of an additional drain.  8/11-second drain placed without difficulty. General surgery signed off, because they feel this pelvic/abdominal abscess is more likely a GYN issue, suspected perforated uterus.  Initial drain placed on 8/7, came out accidentally 8/13 and IR recommended removal.  ID consulted and recommended changing her to Levaquin, Augmentin at discharge.   8/16 repeat CT scan abdomen pelvis showed persistent abscess in uterine area ~4 cm, small bowel dilation concern for ileus vs SBO.  Reconsulted surgery, gynecology and ID.  8/16 given Gastrografin for possible small bowel follow-through.  Had an episode of emesis but since then has been having bowel movements and tolerating clear liquid diet.  Advance diet as tolerated recommended by surgery.  Course complicated by new-onset atrial fibrillation with RVR.  Cards consulted.  Started on heparin and IV amiodarone.  Have now transitioned to oral amiodarone and apixaban.   She will be here for at least 1-2 days.  TCU at discharge planned.     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 The  largest measures 6.5 x 5.4 cm. Started on zosyn and vancomycin.  General surgery and ID consulted.  8/7 underwent IR abscess drain #1.    8/9 surgery starting on clear liquids.    8/10 repeat CT indicated need of an additional drain.    8/11 IR drain #2 placed without difficulty. General surgery signed off, because they feel this pelvic/abdominal abscess is more likely a GYN issue, suspected perforated uterus.    8/13 unfortunately drain #1 broke and subsequently removed.  Drain #1 cultures-Clostridium, Bacteroides and Proteus.  Drain #2 E. coli, Pseudomonas and Candida.  8/16 repeat CT scan of the abdomen reveals persistent abscess 4 cm, around the drain area, persistent  free fluid in the pelvis, small bowel distention is increased concern for obstruction versus ileus.  IR recommending aggressive irrigation and follow-up CT abscessogram as an outpatient.  Given Gastrografin with subsequently good bowel movements and improvement in abdominal pain.  ID recommending continue Zosyn while in the hospital.  At time of discharge would transition to Levaquin and Flagyl.  Will advance diet as tolerated.  Atrial Fibrillation with RVR  Essential hypertension  Coronary artery disease  Initially treated with IV amiodarone and heparin.  Cardiology consulted.  Spontaneously converted to NSR.  8/7 transitioned to oral amiodarone and apixaban.  Continue Coreg, valsartan and Diltiazem   Hypokalemia  Replace per protocol   Acute cystitis  UC grew Ecoli  On piperacillin-tazobactam, continue   Chronic Hyponatremia  Na was 128 in 3/10/2021, and most recently was 124 on 7/14/2021 a month ago  Monitor.   Severe protein-calorie malnutrition with albumin of 2.4.  Appreciate RD evaluation.  Encourage healthy eating and Ensure clear supplements between meals    COVID-19 PCR negative on 8/5 and 8/13  Noted.  Standard precautions.  Anticoagulation   Apixaban 5 mg twice daily and ASA 81 mg daily as described above.  Ferrer:Not present  Fluids:  Saline locked  Pain meds: Tylenol as needed  Therapy: PT OT recommending TCU  Current Diet  Orders Placed This Encounter      Clear Liquid Diet    Supplements  Active Nourishment Order   Procedures     Snacks/Supplements Adult: Ensure Enlive; Between Meals     Barriers to Discharge: Dietary advancement    Disposition: Anticipate TCU discharge 8/18    Interval History/Subjective:  Patient feeling better today.  Tolerating clear liquid diet and interested in advancing her dietary choices.  No shortness of breath.  No chest pain.  Is remained in normal sinus rhythm.  No family present at this time.  Questions answered to verbalized satisfaction.    Physical Exam/Objective:  Temp:  [98  F (36.7  C)-98.2  F (36.8  C)] 98  F (36.7  C)  Pulse:  [69-84] 84  Resp:  [16-18] 16  BP: (136-144)/(63-66) 144/65  SpO2:  [91 %-96 %] 91 %  Wt Readings from Last 4 Encounters:   08/15/21 63.9 kg (140 lb 12.8 oz)   04/12/21 60.8 kg (134 lb)   03/26/21 61.9 kg (136 lb 7.4 oz)   02/01/21 62.3 kg (137 lb 4.8 oz)     Body mass index is 25.75 kg/m .    Constitutional: awake, alert, cooperative, no apparent distress, and appears stated age  ENT: Normocephalic, without obvious abnormality, atraumatic, external ears without lesions, oral pharynx with moist mucous membranes, tonsils without erythema or exudates, gums normal and good dentition.  Respiratory: No increased work of breathing, good air exchange, clear to auscultation bilaterally, no crackles or wheezing  Cardiovascular: Normal apical impulse, regular rate and rhythm, normal S1 and S2, no S3 or S4, and no murmur noted  GI: Diminished bowel sounds.  Soft mild distention.  No tenderness.  Skin: normal skin color, texture, turgor, no redness, warmth, or swelling, and no rashes  Musculoskeletal: There is no redness, warmth, or swelling of the joints.  Motor strength is 4 out of 5 all extremities bilaterally.  Tone is normal. no lower extremity pitting edema present  Neurologic: Cranial  nerves II-XII are grossly intact. Motor Exam:  Motor exam is symmetrical 5 out of 5 all extremities bilaterally  Sensory:  Sensory intact  Neuropsychiatric: General: normal, calm and normal eye contact Level of consciousness: alert / normal Affect: normal Orientation: oriented to self, place, time and situation Memory and insight: normal, memory for past and recent events intact and thought process normal    Medications:   Personally Reviewed.  Medications       acetaminophen  650 mg Oral Once     amiodarone  200 mg Oral BID     apixaban ANTICOAGULANT  5 mg Oral BID     aspirin  81 mg Oral Daily     carvedilol  6.25 mg Oral BID w/meals     diltiazem ER COATED BEADS  180 mg Oral Daily    And     diltiazem ER COATED BEADS  120 mg Oral Daily     micafungin  100 mg Intravenous Q24H     piperacillin-tazobactam  3.375 g Intravenous Q8H     potassium chloride  20 mEq Oral TID     sodium chloride (PF)  10 mL Irrigation Q8H     sodium chloride (PF)  3 mL Intracatheter Q8H     valsartan  80 mg Oral QPM       Data reviewed today: I personally reviewed all new medications, labs, imaging/diagnostics reports over the past 24 hours. Pertinent findings include:    Imaging:   Recent Results (from the past 24 hour(s))   XR Gastrografin Challenge    Narrative    EXAM: XR GASTROGRAFIN CHALLENGE  LOCATION: [facility]  DATE/TIME: 8/17/2021 12:40 AM    INDICATION: Gastrografin UGI Challenge  COMPARISON: None.      Impression    IMPRESSION: Gastrografin contrast is seen in the colon with markedly dilated distended loops of small bowel suggesting partial obstruction. Pigtail catheter projects over the right hemipelvis.        Labs:  XR Gastrografin Challenge   Final Result   IMPRESSION: Gastrografin contrast is seen in the colon with markedly dilated distended loops of small bowel suggesting partial obstruction. Pigtail catheter projects over the right hemipelvis.       CT Abdomen Pelvis w Contrast   Final Result    IMPRESSION:    1.  The uterine drain is no longer present. There is a 4 cm fluid collection in this region which contains up to 10 mL of fluid; aspiration or new drain placement should be considered.   2.  Free fluid in the pelvis has decreased though has not yet completely resolved with placement of a pigtail catheter.   3.  Small bowel distention has increased, and a small bowel obstruction is suspected. Paralytic ileus could have a similar appearance. Recommend further evaluation.      NOTE: ABNORMAL REPORT      THE DICTATION ABOVE DESCRIBES AN ABNORMALITY FOR WHICH FOLLOW-UP IS NEEDED.       CT Abdomen Peritoneum Abscess Aspiration   Final Result   IMPRESSION:   1.  CT-guided drain placement into right lower quadrant abdominal abscess.      Reference CPT Codes: 68235, 80974      CT Abdomen Pelvis w Contrast   Final Result   Addendum 1 of 1   Addendum: Small bowel ileus or partial obstruction. Ileus is likely.      Final   IMPRESSION:    1.  Intraperitoneal free fluid with peripheral enhancement and internal air locules has increased in volume from the prior study. Additional drain placement should be considered.   2.  The previously drained uterine collection is no longer present.      Echocardiogram Complete   Final Result      CT Abd Peritoneum Abscess Drain w Cath Place   Final Result   IMPRESSION:   1.  Successful CT-guided drain placement into presumed uterine abscess. This 12 English drain was modified with additional sideholes which was positioned within the loculated fluid in the pelvis to provide drainage of both fluid collections.      Reference CPT Codes: 95644, 69052, 66096      CT Abdomen Pelvis w Contrast   Final Result   IMPRESSION:    1.  Findings indicative of acute perforated sigmoid diverticulitis with 2 pelvic abscesses. The largest measures 6.5 x 5.4 cm and may be amenable to percutaneous drainage.   2.  Additionally, there is small volume ascites with peritoneal enhancement indicative of  peritonitis. Small volume peritoneum.   3.  Manifestations of third spacing   4.  Small bilateral effusions and associated atelectasis. There are patchy bibasilar opacities as well which may indicate superimposed infection.      Findings were discussed with Akua Ventura the charge nurse at 1520 hours.       XR Abdomen 1 View   Final Result   IMPRESSION:       Decompressed stomach. 2 adjacent loops of gas containing small bowel in the left lower quadrant are mildly gas distended and may have mild wall thickening. Other small bowel loops in the central pelvis/right lower quadrant are normal caliber. Normal    colonic gas pattern.  Nothing for obstruction or free air.        Atheromatous calcifications in the left related to splenic artery. No nephrolithiasis.        Recent Results (from the past 24 hour(s))   CBC with platelets    Collection Time: 08/17/21  7:53 AM   Result Value Ref Range    WBC Count 12.6 (H) 4.0 - 11.0 10e3/uL    RBC Count 4.18 3.80 - 5.20 10e6/uL    Hemoglobin 10.3 (L) 11.7 - 15.7 g/dL    Hematocrit 32.2 (L) 35.0 - 47.0 %    MCV 77 (L) 78 - 100 fL    MCH 24.6 (L) 26.5 - 33.0 pg    MCHC 32.0 31.5 - 36.5 g/dL    RDW 15.7 (H) 10.0 - 15.0 %    Platelet Count 555 (H) 150 - 450 10e3/uL   Basic metabolic panel    Collection Time: 08/17/21  7:53 AM   Result Value Ref Range    Sodium 130 (L) 136 - 145 mmol/L    Potassium 3.7 3.5 - 5.0 mmol/L    Chloride 99 98 - 107 mmol/L    Carbon Dioxide (CO2) 24 22 - 31 mmol/L    Anion Gap 7 5 - 18 mmol/L    Urea Nitrogen 7 (L) 8 - 28 mg/dL    Creatinine 0.50 (L) 0.60 - 1.10 mg/dL    Calcium 7.9 (L) 8.5 - 10.5 mg/dL    Glucose 99 70 - 125 mg/dL    GFR Estimate 85 >60 mL/min/1.73m2   Extra Red Top Tube    Collection Time: 08/17/21  7:53 AM   Result Value Ref Range    Hold Specimen JIC        Pending Labs:  Unresulted Labs Ordered in the Past 30 Days of this Admission     No orders found from 7/6/2021 to 8/6/2021.          Gurvinder Garcia MD  [facility]  [facility]  Phone: #[phone]

## 2021-08-17 NOTE — PROGRESS NOTES
"[facility] Medicine PROGRESS NOTE      Identification/Summary:   Angelica Toro is a 89 year old female admitted on 8/5/2021. She has a history of bilateral ovarian masses, CAD, HTN, HLD, and chronic hyponatremia presents with fever and chills and admitted for acute cystitis. Her course was complicated by new-onset atrial fibrillation with RVR for which cardiology was consulted and amiodarone and heparin infusions started and they have now transitioned her to oral amiodarone and apixaban. Acute onset of ongoing abdominal pain, without rigidity or signs of peritonitis, CT scan demonstrated acute perforated sigmoid diverticulitis with 2 pelvic abscesses. The largest measures 6.5 x 5.4 cm. She started on piperacillin tazobactam and underwent IR drained abscess 8/7. She is slowly feeling better. Surgery starting her on clear liquids on 8/9.  Repeat CT on 8/10 indicates she needs an additional drain placed, this was done on 8/11 without difficulty. General surgery signed off, because they feel this pelvic/abdominal abscess is more likely a GYN issue.  Drain #1 came out accidentally yesterday and IR recommended to pull it out.  She will have a repeat CT scan abdomen pelvis on Monday, and after will plan on discharging tomorrow to TCU.      Assessment and Plan:     Sepsis secondary to:   Acute Perforated Sigmoid Diverticulitis with Abscesses  Pelvic Abscess  ?Uterine Abscess  CT scan on 8/6 demonstrated above acute findings, and general surgery and IR consulted, and subsequently IR placed drain on 8/7, 8/11  Gynecology recommended ongoing conservative medical management without surgery as tolerated, and recommended IR drain placement \"without regard to possible uterine origin\"  Short-term had broadened IV piperacillin-tazobactam and vancomycin, discontinue vancomycin and continue with piperacillin tazobactam.  New drain placement for residual fluid formation in the pelvis/abdomen 8/11  Cultures so far with Proteus, " "Clostridium, bacteroids  Appreciate ID input  Recommending palliative antibiotic treatment such as Augmentin and Levaquin.  DRAIN #1 got pulled out accidentally on 8/13.  IR recommended removing this drain.  She will have repeat CT scan of the abdomen on Monday if she were to stay inpatient.     Atrial Fibrillation with RVR  Converted to NSR  Started on amiodarone, continue but now switch to oral  Continue Coreg and Diltiazem   IV heparin has been stopped  Has been followed by cardiology  On apixaban.     Hypokalemia  Replace per protocol       UTI  UC grew Ecoli  On piperacillin-tazobactam, continue      Chronic Hyponatremia  Na was 128 in 3/10/2021, and most recently was 124 on 7/14/2021 a month ago  Monitor.    Severe protein-calorie malnutrition with albumin of 2.4.  Plan: Encourage healthy eating and Ensure clear supplements between meals     HTN  CAD  Order home medications.     HLD  Order home statin.       Diet: Snacks/Supplements Adult: Ensure Enlive; Between Meals  NPO for Medical/Clinical Reasons Except for: Meds  DVT Prophylaxis on apixaban  Code Status: No CPR- Do NOT Intubate    Anticipated possible discharge in 1-2 days once clinical progress, improvement milestones are met.    Interval History/Subjective:  She feels her appetite is much improved today.  Today's the first day that she ate much improved.  No nausea vomiting.  Abdominal pain is controlled.  No shortness of breath, chest pain.    Physical Exam/Objective:  Vitals I/O   Vital signs:  Temp: 98.2  F (36.8  C) Temp src: Oral BP: 136/66 Pulse: 83   Resp: 18 SpO2: 96 % O2 Device: None (Room air) Oxygen Delivery: 3 LPM Height: 157.5 cm (5' 2\") Weight: 63.9 kg (140 lb 12.8 oz)  Estimated body mass index is 25.75 kg/m  as calculated from the following:    Height as of this encounter: 1.575 m (5' 2\").    Weight as of this encounter: 63.9 kg (140 lb 12.8 oz).       I/O last 3 completed shifts:  In: 600 [P.O.:600]  Out: 880 [Urine:830; Drains:50] "     Body mass index is 25.75 kg/m .    General Appearance:  Alert, cooperative, no distress   Head:  Normocephalic, atraumatic   Eyes:  PERRL    Throat:  mucosa; moist   Neck: No JVD, thyromegaly   Lungs:   Clear to auscultation bilaterally, respirations unlabored   Chest Wall:  No tenderness or deformity   Heart:  Regular rate and rhythm, S1, S2 normal,no murmur   Abdomen:   Soft, non tender, non distended, bowel sounds present, no guarding or rigidity she has 1 drain in her abdomen with a serosanguineous fluid.   Extremities: No edema, no joint swelling   Skin: Skin color, texture, turgor normal, no rashes or lesions   Neurologic: Alert and oriented X 3, Moves all 4 extremities       Medications:   Personally Reviewed.    acetaminophen  650 mg Oral Once     amiodarone  200 mg Oral BID     apixaban ANTICOAGULANT  5 mg Oral BID     aspirin  81 mg Oral Daily     carvedilol  6.25 mg Oral BID w/meals     diltiazem ER COATED BEADS  180 mg Oral Daily    And     diltiazem ER COATED BEADS  120 mg Oral Daily     micafungin  100 mg Intravenous Q24H     piperacillin-tazobactam  3.375 g Intravenous Q8H     potassium chloride  20 mEq Oral TID     sodium chloride (PF)  10 mL Irrigation Q8H     sodium chloride (PF)  3 mL Intracatheter Q8H     valsartan  80 mg Oral QPM       Data reviewed today: I personally reviewed all new medications, labs, imaging/diagnostics reports over the past 24 hours. Pertinent findings include    Labs:  Most Recent 3 CBC's:  Recent Labs   Lab Test 08/16/21  0458 08/15/21  0556 08/14/21  0555   WBC 10.5 9.6 10.6   HGB 9.6* 9.4* 9.7*   MCV 77* 77* 77*   * 535* 475*     Most Recent 3 BMP's:  Recent Labs   Lab Test 08/16/21  0458 08/15/21  0556 08/14/21  0939 08/13/21  0526 08/11/21  0514   NA  --   --  130* 130* 134*   POTASSIUM 4.0 4.1 3.6 3.7 3.2*   CHLORIDE  --   --  97* 96* 105   CO2  --   --  23 28 23   BUN  --   --  9 8 9   CR  --   --  0.64 0.52* 0.46*   ANIONGAP  --   --  10 6 6   ABIGAIL  --    --  7.7* 7.7* 7.7*   GLC  --   --  182* 105 106     Most Recent 6 Bacteria Isolates From Any Culture (See EPIC Reports for Culture Details):No lab results found.      Jennifer Valenzuela MD  Hospitalist  [facility]

## 2021-08-17 NOTE — PROGRESS NOTES
GYN Progress note    CT reviewed  New fluid pocket noted   Appears amenable to IR drain placement  Would recommend second drain placement over surgical intervention  Will follow    Diony Chaparro MD

## 2021-08-17 NOTE — PROGRESS NOTES
Pt never had NGT placed. Gastrografin challenge today. Had green emesis tonight. Reportedly reluctant for NGT now. Will have XR for f/u around midnight.

## 2021-08-17 NOTE — PROGRESS NOTES
CLINICAL NUTRITION SERVICES - REASSESSMENT NOTE      EVALUATION OF THE PROGRESS TOWARD GOALS   Diet: CL after gastrograffin   She was getting ensure clear and ensure but stated she doesn't want them anymore. She is refusing other supplement options as well.       ANTHROPOMETRICS  Admission wt: 58kg  Most Recent Weight: 63kg      GI Status  Loose green stools noted in flowsheet   Large green emesis noted in RN charting  Abd is rounded     LABS  Reviewed    MEDICATIONS  Reviewed      NUTRITION DIAGNOSIS  Altered GI function related to perforated diverticulitis as evidenced by need for NPO and drain placement       Goals:  (new) Meet est needs -currently not met as on CL        INTERVENTIONS  Implementation  Pt requesting supplements to be discontinued. She refused any other options     Encourage po       Monitoring/Evaluation  Progress toward goals will be monitored and evaluated per protocol.

## 2021-08-17 NOTE — PROGRESS NOTES
Patient alert and oriented. VSS. Gastrografin administered orally around 1615 per surgery order; follow-up xray scheduled for 12:30 am tomorrow. Patient remain NPO since 1600. Denies any pain and discomfort. Bowel sounds present denies nausea. IV antibiotics given as ordered. Patient had a very large green emesis around 2130 shortly after taking her scheduled bedtime meds; patient denies feeling nauseous and denies the need for intervention. Dr. Berg made aware. Will continue to monitor patient's symptoms at this time per MD. Patient verbalized feeling better and resting comfortably in bed at this time.

## 2021-08-17 NOTE — PROGRESS NOTES
"INFECTIOUS DISEASE FOLLOW UP NOTE    Date: 2021   CHIEF COMPLAINT:   Chief Complaint   Patient presents with     Chills        ASSESSMENT:  89F with carcinoid tumor  Hx laparoscopy.  Suspected perforated uterus with pelvic abscess: cultures with proteus and anaerobes-this drain has since been removed. Also had abdominal abscess drained, growing e coli, pseudomonas, and candida (after several days of antibiotics). Improving on pip-tazo IV. Micafungin added .   Repeat CT  with residual pelvic fluid collection ?drain replacement    PLAN:  - continue pip-tazo IV while in the hospital  - continue micafungin to cover the candida while in the hospital. Unclear significance (isolated after several days of IV antibiotics). Discussed with patient, family and will treat with micafungin while in the hospital. PO option for discharge would be fluconazole which has significant drug interactions with her medications (amiodarone, dilt, etc), so will stop antifugnal coverage at hospital discharge. Risk/benefits discussed with patient/family  - would plan levofloxacin/flagyl for discharge  - discussed with patient and family  - ID will follow, thanks    Yvonne Morrell MD  [facility]   On-Call: [phone]     ______________________________________________________________________    SUBJECTIVE / INTERVAL HISTORY: resting. Gastrografin last night with bowel movements. Feeling ok.     ROS: All other systems negative except as listed above.    SH/FH/Habits/PMH reviewed and unchanged.    OBJECTIVE:  BP (!) 140/64 (BP Location: Left arm)   Pulse 80   Temp 98  F (36.7  C) (Oral)   Resp 16   Ht 1.575 m (5' 2\")   Wt 63.9 kg (140 lb 12.8 oz)   SpO2 91%   BMI 25.75 kg/m       Resp: 16      Vital Signs  Temp: 98.3  F (36.8  C)  Temp src: Oral  Resp: 20  Pulse: 74  Pulse Rate Source: Monitor  BP: (!) 152/69  BP Location: Left arm    Temp (24hrs), Av.2  F (36.8  C), Min:98.1  F (36.7  C), " Max:98.3  F (36.8  C)      GEN: No acute distress.    RESPIRATORY:  Normal breathing pattern. Clear to auscultation bilaterally  CARDIOVASCULAR:  Regular rate and rhythm. No murmur, click, gallop or rub.   ABDOMEN:  Soft, normal bowel sounds, non-tender, RLQ drain with purulent fluid  EXTREMITIES: No edema.  SKIN/HAIR/NAILS:  No rashes  IV: peripheral IV        Antibiotics:  Pip-tazo IV 8/6-  micafungin 8/16-    Pertinent labs:  No results found for: CRP   CBC RESULTS:   Recent Labs   Lab Test 08/16/21  0458   WBC 10.5   RBC 3.86   HGB 9.6*   HCT 29.8*   MCV 77*   MCH 24.9*   MCHC 32.2   RDW 15.3*   *      Last Comprehensive Metabolic Panel:  Sodium   Date Value Ref Range Status   08/17/2021 130 (L) 136 - 145 mmol/L Final     Potassium   Date Value Ref Range Status   08/17/2021 3.7 3.5 - 5.0 mmol/L Final     Chloride   Date Value Ref Range Status   08/17/2021 99 98 - 107 mmol/L Final     Carbon Dioxide (CO2)   Date Value Ref Range Status   08/17/2021 24 22 - 31 mmol/L Final     Anion Gap   Date Value Ref Range Status   08/17/2021 7 5 - 18 mmol/L Final     Glucose   Date Value Ref Range Status   08/17/2021 99 70 - 125 mg/dL Final     Urea Nitrogen   Date Value Ref Range Status   08/17/2021 7 (L) 8 - 28 mg/dL Final     Creatinine   Date Value Ref Range Status   08/17/2021 0.50 (L) 0.60 - 1.10 mg/dL Final     GFR Estimate   Date Value Ref Range Status   08/17/2021 85 >60 mL/min/1.73m2 Final     Comment:     As of July 11, 2021, eGFR is calculated by the CKD-EPI creatinine equation, without race adjustment. eGFR can be influenced by muscle mass, exercise, and diet. The reported eGFR is an estimation only and is only applicable if the renal function is stable.     Calcium   Date Value Ref Range Status   08/17/2021 7.9 (L) 8.5 - 10.5 mg/dL Final        MICROBIOLOGY DATA:  Personally reviewed.    8/7 pelvic abscess culture proteus, clostridium ramosum, b frag  8/11 abdominal abscess: e coli, pseudomonas,  candida    Escherichia coli Pseudomonas aeruginosa       JIMMY JIMMY     Ampicillin <=4 ug/mL Susceptible       Ampicillin/ Sulbactam 4/2 ug/mL Susceptible       Aztreonam   4 ug/mL Susceptible     Cefazolin 2 ug/mL Susceptible       Cefepime <=1 ug/mL Susceptible 2 ug/mL Susceptible     Ceftazidime   <=2 ug/mL Susceptible     Ceftriaxone <=1 ug/mL Susceptible       Ciprofloxacin <=0.25 ug/mL Susceptible <=0.25 ug/mL Susceptible     Gentamicin <=2 ug/mL Susceptible <=2 ug/mL Susceptible     Levofloxacin <=0.5 ug/mL Susceptible <=0.5 ug/mL Susceptible     Meropenem <=0.5 ug/mL Susceptible <=0.5 ug/mL Susceptible     Piperacillin/Tazobactam <=2/4 ug/mL Susceptible 4/4 ug/mL Susceptible     Tetracycline <=2 ug/mL Susceptible       Tobramycin <=2 ug/mL Susceptible <=2 ug/mL Susceptible     Trimethoprim/Sulfa <=0.5 ug/mL Susceptible                 Proteus mirabilis       JIMMY     Ampicillin <=4 ug/mL Susceptible     Ampicillin/ Sulbactam <=1/0.5 ug/mL Susceptible     Cefepime <=1 ug/mL Susceptible     Ceftriaxone <=1 ug/mL Susceptible     Ciprofloxacin <=0.25 ug/mL Susceptible     Gentamicin <=2 ug/mL Susceptible     Levofloxacin <=0.5 ug/mL Susceptible     Piperacillin/Tazobactam <=2/4 ug/mL Susceptible     Tetracycline >8 ug/mL Resistant     Tobramycin <=2 ug/mL Susceptible     Trimethoprim/Sulfa <=0.5 ug/mL Susceptible          RADIOLOGY:  Personally Reviewed.  Recent Results (from the past 24 hour(s))   CT Abdomen Pelvis w Contrast    Narrative    EXAM: CT ABDOMEN PELVIS W CONTRAST  LOCATION: [facility]  DATE/TIME: 8/16/2021 9:35 AM    INDICATION: Drained uterine abscess. More recent drain placement for intraperitoneal free fluid. Follow-up.   COMPARISON: Procedure CT dated 08/11/2021. CT abdomen pelvis dated 08/10/2021.   TECHNIQUE: CT scan of the abdomen and pelvis was performed following injection of IV contrast. Multiplanar reformats were obtained. Dose reduction techniques were  used.  CONTRAST: ISOVUE 370 100 mL    FINDINGS:   LOWER CHEST: Small pleural effusions with atelectasis. Coronary artery disease.     HEPATOBILIARY: Small circumscribed hypoattenuating hepatic lesions have not changed. Status post cholecystectomy.     PANCREAS: Normal.    SPLEEN: Normal.    ADRENAL GLANDS: Normal.    KIDNEYS/BLADDER: Simple renal cysts do not require follow-up. A 1.6 x 1.2 cm lesion at the posterior middle third of the right kidney is 30 Hounsfield units.  There is mild right hydronephrosis and hydroureter, presumably related to the right pelvic   process. The urinary bladder is distended.    BOWEL: Normal stomach. Multiple small bowel loops are distended with a maximum diameter of 4.3 cm. Air-fluid levels are present. No definite transition point identified. The colon is normal in appearance.  No pneumatosis intestinalis.    LYMPH NODES: Multiple prominent and enlarged mesenteric lymph nodes are present.     VASCULATURE: Atherosclerotic disease. 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  No portal venous gas.    PELVIC ORGANS: The uterine catheter has been removed. There is a 4.2 x 3.1 x 1.8 cm collection with air locules and peripheral enhancement in this region (series 3 image 170).     OTHER: There is free fluid with peripheral enhancement and internal air locules in the right pelvis. A pigtail catheter terminates within this fluid; the fluid has decreased in volume from the comparison study.    MUSCULOSKELETAL: Degenerative change in the spine.       Impression    IMPRESSION:   1.  The uterine drain is no longer present. There is a 4 cm fluid collection in this region which contains up to 10 mL of fluid; aspiration or new drain placement should be considered.  2.  Free fluid in the pelvis has decreased though has not yet completely resolved with placement of a pigtail catheter.  3.  Small bowel distention has increased, and a small  bowel obstruction is suspected. Paralytic ileus could have a similar appearance. Recommend further evaluation.    NOTE: ABNORMAL REPORT    THE DICTATION ABOVE DESCRIBES AN ABNORMALITY FOR WHICH FOLLOW-UP IS NEEDED.        Principal Problem:    Acute cystitis without hematuria  Active Problems:    Essential hypertension    Hyperlipidemia    Chills    Chronic hyponatremia    Atrial fibrillation with rapid ventricular response (H)    Abdominal pain, generalized    Perforated sigmoid colon (H)    Pelvic abscess in female    Sepsis without acute organ dysfunction (H)

## 2021-08-17 NOTE — PROGRESS NOTES
Care Management Follow Up    Length of Stay (days): 11    Expected Discharge Date: 08/18/2021  Expected Time of Departure: 3:00PM.  Concerns to be Addressed:       Patient plan of care discussed at interdisciplinary rounds: Yes    Anticipated Discharge Disposition:       Anticipated Discharge Services:    Anticipated Discharge DME:      Patient/family educated on Medicare website which has current facility and service quality ratings:    Education Provided on the Discharge Plan:    Patient/Family in Agreement with the Plan:      Referrals Placed by CM/SW:    Private pay costs discussed: Not applicable    Additional Information:  Chart reviewed, following pt regarding discharge plans.  Anticipate return to [facility] at discharge.  Daughter Krista updated and message left with admissions at [facility].      YADI Carrera

## 2021-08-17 NOTE — PLAN OF CARE
Problem: Pain Acute  Goal: Acceptable Pain Control and Functional Ability  Outcome: Improving  Declined.     Problem: Risk for Delirium  Goal: Improved Sleep  Outcome: Improving   Was able to sleep most of the shift.     Problem: UTI (Urinary Tract Infection)  Goal: Improved Infection Symptoms  Outcome: Improving   Utilizing the purewick. No signs and symptoms of UTI    Problem: Adult Inpatient Plan of Care  Goal: Plan of Care Review  Outcome: Improving   Had gastrografin challenge xray around 0030. IVSL. Has alarms on for safety. IV abx. K protocol addressed. GUANAKO to RLQ patent and draining.

## 2021-08-18 NOTE — PLAN OF CARE
Problem: Discharge Planning  Goal: Discharge Planning (Adult, OB, Behavioral, Peds)  Outcome: Improving   Patient to discharge to TCU on 8/19.  Turned and repositioned in bed or the chair q2h. Mepilex dressing applied to bottom. WOC consult is pending. One episode of loose stool this shift. Incontinent brief in place.  GUANAKO drain remains in place with minimal milky output.   Patient advanced to a regular diet, but reports minimal diet. Family is at bedside and encouraging patient to eat.

## 2021-08-18 NOTE — PROGRESS NOTES
Care Management Follow Up    Length of Stay (days): 12    Expected Discharge Date:  08/19/21  Expected Time of Departure: 1430hrs on 8/19 via [facility] w/c transport.    Concerns to be Addressed: all concerns addressed in this encounter       Patient plan of care discussed at interdisciplinary rounds: Yes     Anticipated Discharge Disposition: Transitional Care  Disposition Comments: Family and facility updated.    Anticipated Discharge Services:  PT/OT    Patient/family educated on Medicare website which has current facility and service quality ratings: yes (By previous CM.)    Education Provided on the Discharge Plan:  Yes - family is aware of current plan.  Patient/Family in Agreement with the Plan: yes    Referrals Placed by CM/SW: Post Acute Facilities  Private pay costs discussed: Yes. Family is aware of potential cost of transport if not covered by insurance. They are not concerned about the cost.    Additional Information:  At this time of 1209hrs - The current plan is for pt to transfer to [facility] upon discharge via [facility] wheelchair transport. Family has requested that transport services be utilized as they do not safely feel comfortable transporting the pt and she is too acute to take a Taxi cab.    Facility stated it would be a private-room and asked that pt arrive at 1500hrs or later - transport arranged at 1430hrs. Facility admissions updated by CM.    PAS previously done.    CM to follow.    Griffin Cerda RN

## 2021-08-18 NOTE — CONSULTS
Wound Ostomy  WOC Assessment       Allergies:  Amlodipine  Anastrozole  Lisinopril  Losartan  Sulfa Drugs  Thiazide-Type Diuretics  Furosemide  Hydrochlorothiazide  Metolazone  Morphine  No Clinical Screening - See Comments    Diagnosis:   Patient Active Problem List    Diagnosis Date Noted     Perforated sigmoid colon (H) 08/07/2021     Priority: Medium     Pelvic abscess in female 08/07/2021     Priority: Medium     Sepsis without acute organ dysfunction (H) 08/07/2021     Priority: Medium     Atrial fibrillation with rapid ventricular response (H) 08/06/2021     Priority: Medium     Abdominal pain, generalized 08/06/2021     Priority: Medium     Chills 08/05/2021     Priority: Medium     Acute cystitis without hematuria 08/05/2021     Priority: Medium     Chronic hyponatremia 08/05/2021     Priority: Medium     S/P laparoscopy 03/26/2021     Priority: Medium     Pelvic mass 02/15/2021     Priority: Medium     Added automatically from request for surgery [id number]       Complex cyst of right ovary 07/02/2018     Priority: Medium     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       Small bowel obstruction (H) 12/22/2016     Priority: Medium     Overview:   Last Assessment & Plan:   Resolved, stools are a bit firm but no residual abdominal pain. Discussed hydration and fiber intake. She will rtc if improved hydration and fiber intake are not helpful in softening stools.       Family hx-breast malignancy 03/28/2014     Priority: Medium     Coronary atherosclerosis 09/11/2012     Priority: Medium     Overview:   s/p MI 10/2010, single vessel  disease on cath, SREEDHAR to mid-circ.       Diastolic dysfunction 06/28/2012     Priority: Medium     Asymptomatic varicose veins 09/16/2007     Priority: Medium     Overview:   Varicose Veins       Benign neoplasm of colon 09/16/2007     Priority: Medium     Overview:   Polyp Colon Adenomatous       Diverticulosis of large intestine 09/16/2007     Priority: Medium     Overview:   Diverticulosis Colon       Essential hypertension 09/16/2007     Priority: Medium     Overview:   Hypertension Chronic       Hyperlipidemia 09/16/2007     Priority: Medium     Lumbago 09/16/2007     Priority: Medium     Overview:   Pain Back Lumbar       Urinary incontinence 09/16/2007     Priority: Medium     Overview:   LW Modifier:  stress  ; Incontinence Urinary  NOS       Labs:  Recent Labs   Lab Test 08/18/21  0704 08/13/21  0526   HGB 9.3* 9.5*   ALBUMIN  --  1.4*       Glenn:  Glenn Score: 15    Specialty Bed:   Standard med surg mattre    Wound culture obtained: No    Edema:  No    Anatomic Site/Laterality: Buttocks    Reason for ongoing care:   Skin integrity assessment and plan of care    Encounter Type:  Initial Wound Type:   Scattered areas of erythema and dried drainage. Largest approximately 0.5 cm x 0.5 cm with yellow discoloration noted with may be softening non-viable tissue.  None noted over deloris prominences.This appears to be related incontinence associated dermatitis.    Plan of care: Will begin Critic Aid clear with each pericares.         Nursing care provided was coordinated care with CNA.    Discussed plan of care with patient and family member.    Outcomes and treatment recommendations are to promote skin integrity.    Actions taken by WOC RN: 5 minutes of education and WOC Discharge recommendations entered.    Planned Follow Up: Weekly.    Plan for next visit: Reassess skin integrity.

## 2021-08-18 NOTE — PLAN OF CARE
Problem: Adult Inpatient Plan of Care  Goal: Absence of Hospital-Acquired Illness or Injury     Surgery by this morning. Started on clear liquid diet as having bowel movements. Per Angelica, she didn't sleep well last night so she wanted to catch up on some rest today. Getting up to commode this morning to void and for bowel movements but then this afternoon able to walk to bathroom. Lost IV access, reestablished by BETSY RN. Denies pain. Appetite remains poor but denies nausea. Orders now to advance diet as tolerated. Three concerning areas for pressure ulcers on buttocks. Nursing to turn q2h to off-load pressure and continue to keep areas clean. Page out to MD to request WOC consult. GUANAKO with milky tan drainage. Irrigating per orders. Report given to oncoming RN.

## 2021-08-18 NOTE — DISCHARGE INSTRUCTIONS
Drain Placement Discharge Instructions:  You had a small tube or drain(s) placed. This was placed so the abnormal fluid collection within your body can be drained out (externally). Please follow the below instructions as you recover:    Care Instructions after drain placement:  - If you received sedation for your procedure, do not drive or operate heavy machinery for the rest of the day.  - Rest after your drain(s) were placed. Avoid strenuous activity and heavy lifting for the next 2 days. Return to your normal activities as you tolerate after the 2 day restriction.  - You may shower; however, you should not submerge site under water like in a tub bath, Jacuzzi or pool. Cover drain(s) exit site(s) with plastic wrap while showering.  - Keep dressing clean and dry as long as drain (s) are in place. If you have a gauze dressing, please change the dressing as needed to keep site(s) clean and dry.  - You may eat and drink as normal.  - You may have discomfort after the procedure near the drain exit site(s). You may take acetaminophen (Tylenol ) or ibuprofen (Motrin ) as needed for any discomfort.  - Inspect the tube(s) often for kinks.  - If you have a gravity bag, keep the bag below the drain exit site(s) to allow for free flow of drainage by gravity.  - Flush your drainage tube with 10mL sterile normal saline daily.  - Record your daily drain(s) outputs and amount flushed on your drainage record. Bring your records to your next radiology appointment.  - Empty your drainage bag(s)/bulb(s) daily or when it is approximately half way fluid. Follow below instructions for emptying your bag(s)/bulb(s):  - Clean hands well with soap and water.  - Place a measuring container near the outlet valve of the drainage bag/bulb.  - If you have a drainage BAG: Twist the blue valve at the bottom of the bag while holding the valve over your measuring cup and open container to empty. Re-twist the valve closed on the drainage bag once  "complete.  - If you have a drainage BULB: Open the bulb cap (at the top of the bulb). Empty the fluid into the measuring cup. Squeeze bulb and hold flat. While bulb is squeezed, close the cap.  - After draining fluid record your drainage output. Bring record of your drain outputs to your next radiology appointment.  - Discard drainage into toilet once fluid drained.    Flushing Your Drainage Tube:   1. Collect flushing supplies: 10mL of sterile normal saline in syringe, alcohol pad.  2. Clean the flushing (center) port with alcohol and attach the flushing syringe by twisting into place.  3. Turn the 3 way stopcock valve \"off\" to the drainage bag/bulb.  4. Gently inject/flush the drain so fluid is moving towards your body through the drainage catheter.   5. Turn the stopcock valve back to its center position to allow for drainage to resume.   6. Remove flushing syringe from flushing port by twisting off.?    Follow-Up:   - [facility] will call you to schedule your next follow-up appointment. Please call [facility] if you are not contacted within the next week at [phone]     Please seek medical evaluation for:  - Nausea and/or vomiting.  - Diffuse abdominal pain.  - Fevers (greater than 101 F (38.3C)).    Call [facility] at [phone] with tube related questions or concerns:  - Drainage tube(s) falls out, is pulled back or felt to be out of position.   - Unable to flush drainage tube(s).   - Leakage (or purulent drainage) around drainage tube site(s).   - Extreme pain at drainage tube site(s).   - Outputs suddenly stop or significantly reduces.   - Warmth, redness, swelling or tenderness around the drainage tube(s).    Buttocks skin care - use dimethicone moisture barrier with each dariusz cares    "

## 2021-08-18 NOTE — PROGRESS NOTES
[facility] MEDICINE PROGRESS NOTE      Code Status: No CPR- Do NOT Intubate       Identification/Summary:   89 year old female admitted on 8/5/2021. 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  8/5 admitted with fever and chills with acute cystitis.  Acute onset of ongoing abdominal pain.  CT scan demonstrated acute perforated sigmoid diverticulitis with 2 pelvic abscesses. The largest measures 6.5 x 5.4 cm. Started on zosyn and 8/7 underwent IR abscess drain.  8/9 surgery starting on clear liquids.  8/10 repeat CT indicated need of an additional drain.  8/11-second drain placed without difficulty. General surgery signed off, because they feel this pelvic/abdominal abscess is more likely a GYN issue, suspected perforated uterus.  Initial drain placed on 8/7, came out accidentally 8/13 and IR recommended removal.  ID consulted and recommended changing her to Levaquin, Augmentin at discharge.   8/16 repeat CT scan abdomen pelvis showed persistent abscess in uterine area ~4 cm, small bowel dilation concern for ileus vs SBO.  Reconsulted surgery, gynecology and ID.  8/16 given Gastrografin for possible small bowel follow-through.  Had an episode of emesis but since then has been having bowel movements and tolerating clear liquid diet.  Advance diet as tolerated recommended by surgery.  Course complicated by new-onset atrial fibrillation with RVR.  Cards consulted.  Started on heparin and IV amiodarone.  Have now transitioned to oral amiodarone and apixaban.   She will be here for at least 1-2 days.  Awaiting bed availability at TCU.     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 The largest measures 6.5 x 5.4 cm. Started on zosyn and vancomycin.  General surgery and ID consulted.  8/7 underwent IR abscess drain #1.    8/9 surgery starting on clear liquids.    8/10 repeat CT indicated need of an additional drain.    8/11 IR drain #2 placed without difficulty. General surgery signed off, because they feel this pelvic/abdominal abscess is more likely a GYN issue, suspected perforated uterus.    8/13 unfortunately drain #1 broke and subsequently removed.  Drain #1 cultures-Clostridium, Bacteroides and Proteus.  Drain #2 E. coli, Pseudomonas and Candida.  8/16 repeat CT scan of the abdomen reveals persistent abscess 4 cm, around the drain area, persistent  free fluid in the pelvis, small bowel distention is increased concern for obstruction versus ileus.  IR recommending aggressive irrigation and follow-up CT abscessogram as an outpatient.  Given Gastrografin with subsequently good bowel movements and improvement in abdominal pain.  ID recommending continue Zosyn while in the hospital.  At time of discharge would transition to Levaquin and Flagyl.  Will advance diet as tolerated.  Atrial Fibrillation with RVR  Essential hypertension  Coronary artery disease  Initially treated with IV amiodarone and heparin.  Cardiology consulted.  Spontaneously converted to NSR.  8/7 transitioned to oral amiodarone and apixaban.  Continue Coreg, valsartan and Diltiazem   Hypokalemia  Replace per protocol   Acute cystitis  UC grew Ecoli  On piperacillin-tazobactam, continue   Chronic Hyponatremia  Na was 128 in 3/10/2021, and most recently was 124 on 7/14/2021 a month ago  Monitor.   Severe protein-calorie malnutrition with albumin of 2.4.  Appreciate RD evaluation.  Encourage healthy eating and Ensure clear supplements between meals  Multiple scabs and excoriations  Reviewed skin issues with nursing staff.  Patient has multiple scabs and excoriations some of which have sloughed off.  Wound care nurse  consulted.     COVID-19 PCR negative on 8/5 and 8/13  Noted.  Standard precautions.  Anticoagulation   Apixaban 5 mg twice daily and ASA 81 mg daily as described above.  Ferrer:Not present  Fluids: Saline locked  Pain meds: Tylenol as needed  Therapy: PT OT recommending TCU.  Unfortunately anticipated TCU does not have a bed available today but will 8/19.  Care management to discuss with family.  Current Diet  Orders Placed This Encounter      Advance Diet as Tolerated: Full Liquid Diet    Supplements  None    Barriers to Discharge: TCU bed availability, wound care nurse evaluation    Disposition: Anticipate discharge 8/19 to TCU    Interval History/Subjective:  Patient doing well.  Appetite is diminished but was able to eat a little bit this morning for breakfast.  No chest pain.  No shortness of breath.  No nausea or vomiting.  Feels that her abdominal pain is doing well.  GUANAKO drain is putting out serous fluid.  No redness.  Patient does have a number of scabs and excoriations from scratching noted by nursing.  No family present at this time.  Questions answered to verbalized satisfaction.    Physical Exam/Objective:  Temp:  [97.5  F (36.4  C)-98.4  F (36.9  C)] 98.1  F (36.7  C)  Pulse:  [73-84] 73  Resp:  [14-16] 16  BP: (126-149)/(59-67) 149/67  SpO2:  [93 %-96 %] 93 %  Wt Readings from Last 4 Encounters:   08/18/21 57.7 kg (127 lb 1.6 oz)   04/12/21 60.8 kg (134 lb)   03/26/21 61.9 kg (136 lb 7.4 oz)   02/01/21 62.3 kg (137 lb 4.8 oz)     Body mass index is 23.25 kg/m .    Constitutional: awake, alert, cooperative, no apparent distress, and appears stated age and cachectic  ENT: Normocephalic, without obvious abnormality, atraumatic, external ears without lesions, oral pharynx with moist mucous membranes, tonsils without erythema or exudates, gums normal and good dentition.  Respiratory: No increased work of breathing, good air exchange, clear to auscultation bilaterally, no crackles or wheezing  Cardiovascular:  Normal apical impulse, regular rate and rhythm, normal S1 and S2, no S3 or S4, and no murmur noted  GI: No scars, normal bowel sounds, soft, non-distended, non-tender, no masses palpated, no hepatosplenomegally and diminished bowel sounds.  GUANAKO drain noted to have milky serous fluid present.  Skin: Number of scabs and excoriations noted.  Please reference nursing notes for full skin exam.  Musculoskeletal: No redness or warmth.  No lower extremity edema.   Neurologic: Cranial nerves II-XII are grossly intact. Motor Exam:  Motor exam is symmetrical 5 out of 5 all extremities bilaterally  Sensory:  Sensory intact  Neuropsychiatric: General: normal, calm and normal eye contact Level of consciousness: alert / normal Affect: normal Orientation: oriented to self, place, time and situation Memory and insight: normal, memory for past and recent events intact and thought process normal    Medications:   Personally Reviewed.  Medications       acetaminophen  650 mg Oral Once     amiodarone  200 mg Oral BID     apixaban ANTICOAGULANT  5 mg Oral BID     aspirin  81 mg Oral Daily     carvedilol  6.25 mg Oral BID w/meals     diltiazem ER COATED BEADS  180 mg Oral Daily    And     diltiazem ER COATED BEADS  120 mg Oral Daily     micafungin  100 mg Intravenous Q24H     piperacillin-tazobactam  3.375 g Intravenous Q8H     potassium chloride  20 mEq Oral TID     sodium chloride (PF)  10 mL Irrigation Q8H     sodium chloride (PF)  3 mL Intracatheter Q8H     valsartan  80 mg Oral QPM       Data reviewed today: I personally reviewed all new medications, labs, imaging/diagnostics reports over the past 24 hours. Pertinent findings include:    Imaging:   No results found for this or any previous visit (from the past 24 hour(s)).    Labs:  XR Gastrografin Challenge   Final Result   IMPRESSION: Gastrografin contrast is seen in the colon with markedly dilated distended loops of small bowel suggesting partial obstruction. Pigtail catheter  projects over the right hemipelvis.       CT Abdomen Pelvis w Contrast   Final Result   IMPRESSION:    1.  The uterine drain is no longer present. There is a 4 cm fluid collection in this region which contains up to 10 mL of fluid; aspiration or new drain placement should be considered.   2.  Free fluid in the pelvis has decreased though has not yet completely resolved with placement of a pigtail catheter.   3.  Small bowel distention has increased, and a small bowel obstruction is suspected. Paralytic ileus could have a similar appearance. Recommend further evaluation.      NOTE: ABNORMAL REPORT      THE DICTATION ABOVE DESCRIBES AN ABNORMALITY FOR WHICH FOLLOW-UP IS NEEDED.       CT Abdomen Peritoneum Abscess Aspiration   Final Result   IMPRESSION:   1.  CT-guided drain placement into right lower quadrant abdominal abscess.      Reference CPT Codes: 72333, 10464      CT Abdomen Pelvis w Contrast   Final Result   Addendum 1 of 1   Addendum: Small bowel ileus or partial obstruction. Ileus is likely.      Final   IMPRESSION:    1.  Intraperitoneal free fluid with peripheral enhancement and internal air locules has increased in volume from the prior study. Additional drain placement should be considered.   2.  The previously drained uterine collection is no longer present.      Echocardiogram Complete   Final Result      CT Abd Peritoneum Abscess Drain w Cath Place   Final Result   IMPRESSION:   1.  Successful CT-guided drain placement into presumed uterine abscess. This 12 Telugu drain was modified with additional sideholes which was positioned within the loculated fluid in the pelvis to provide drainage of both fluid collections.      Reference CPT Codes: 65780, 11674, 87390      CT Abdomen Pelvis w Contrast   Final Result   IMPRESSION:    1.  Findings indicative of acute perforated sigmoid diverticulitis with 2 pelvic abscesses. The largest measures 6.5 x 5.4 cm and may be amenable to percutaneous drainage.   2.   Additionally, there is small volume ascites with peritoneal enhancement indicative of peritonitis. Small volume peritoneum.   3.  Manifestations of third spacing   4.  Small bilateral effusions and associated atelectasis. There are patchy bibasilar opacities as well which may indicate superimposed infection.      Findings were discussed with Akua Ventura the charge nurse at 1520 hours.       XR Abdomen 1 View   Final Result   IMPRESSION:       Decompressed stomach. 2 adjacent loops of gas containing small bowel in the left lower quadrant are mildly gas distended and may have mild wall thickening. Other small bowel loops in the central pelvis/right lower quadrant are normal caliber. Normal    colonic gas pattern.  Nothing for obstruction or free air.        Atheromatous calcifications in the left related to splenic artery. No nephrolithiasis.        Recent Results (from the past 24 hour(s))   CBC with platelets    Collection Time: 08/18/21  7:04 AM   Result Value Ref Range    WBC Count 10.5 4.0 - 11.0 10e3/uL    RBC Count 3.70 (L) 3.80 - 5.20 10e6/uL    Hemoglobin 9.3 (L) 11.7 - 15.7 g/dL    Hematocrit 29.0 (L) 35.0 - 47.0 %    MCV 78 78 - 100 fL    MCH 25.1 (L) 26.5 - 33.0 pg    MCHC 32.1 31.5 - 36.5 g/dL    RDW 15.6 (H) 10.0 - 15.0 %    Platelet Count 486 (H) 150 - 450 10e3/uL   Extra Green Top (Lithium Heparin) Tube    Collection Time: 08/18/21  7:04 AM   Result Value Ref Range    Hold Specimen JIC    Potassium    Collection Time: 08/18/21  7:04 AM   Result Value Ref Range    Potassium 3.9 3.5 - 5.0 mmol/L       Pending Labs:  Unresulted Labs Ordered in the Past 30 Days of this Admission     No orders found from 7/6/2021 to 8/6/2021.          Gurvinder Garcia MD  [facility]  Phone: #[phone]

## 2021-08-18 NOTE — PLAN OF CARE
Problem: Pain Acute  Goal: Acceptable Pain Control and Functional Ability  Outcome: Improving  Declined.     Problem: Risk for Delirium  Goal: Improved Sleep  Outcome: Improving   Was able to sleep most of the shift.     Problem: UTI (Urinary Tract Infection)  Goal: Improved Infection Symptoms  Outcome: Improving   Utilizing the purewick. No signs and symptoms of UTI.     Problem: Adult Inpatient Plan of Care  Goal: Plan of Care Review  Outcome: Improving  Able to tolerate diet, but does not have much of an appetite. Turning pt every 2 hours to offload pressure as excoriation to buttock is red and open. IVSL. Has alarms on for safety. IV abx. K protocol addressed. GUANAKO to RLQ patent and draining tan drainage. Was irrigated this shift.

## 2021-08-18 NOTE — PROVIDER NOTIFICATION
Discovered questionable pressure ulcers on bilateral buttocks (one quarter-sized area on left upper buttocks and two smaller open areas on right upper buttocks.) Page sent to MD to inform and inquire about WOC consult.

## 2021-08-18 NOTE — PROGRESS NOTES
"INFECTIOUS DISEASE FOLLOW UP NOTE    Date: 08/18/2021   CHIEF COMPLAINT:   Chief Complaint   Patient presents with     Chills        ASSESSMENT:  89F with carcinoid tumor  Hx laparoscopy.  Suspected perforated uterus with pelvic abscess: cultures with proteus and anaerobes-this drain has since been removed. Also had abdominal abscess drained, growing e coli, pseudomonas, and candida (after several days of antibiotics). Improving on pip-tazo IV. Micafungin added 8/16. Active issue.   Repeat CT 8/16 with residual pelvic fluid collection ?drain replacement    PLAN:  - continue pip-tazo IV while in the hospital  - continue micafungin to cover the candida while in the hospital. Unclear significance (isolated after several days of IV antibiotics). Discussed with patient, family and will treat with micafungin while in the hospital. PO option for discharge would be fluconazole which has significant drug interactions with her medications (amiodarone, dilt, etc), so will stop antifugnal coverage at hospital discharge. Risk/benefits discussed with patient/family  - would plan levofloxacin/flagyl for discharge for another 14 days  - discussed with patient and family  - ID will sign off. Please call with additional questions or change in clinical status.     Yvonne Morrell MD  [facility]   On-Call: [phone]     ______________________________________________________________________    SUBJECTIVE / INTERVAL HISTORY: per daughter perhaps discharging today. Not much appetite.     ROS: All other systems negative except as listed above.    SH/FH/Habits/PMH reviewed and unchanged.    OBJECTIVE:  BP (!) 149/67 (BP Location: Left arm)   Pulse 73   Temp 98.1  F (36.7  C) (Oral)   Resp 16   Ht 1.575 m (5' 2\")   Wt 57.7 kg (127 lb 1.6 oz)   SpO2 93%   BMI 23.25 kg/m       Resp: 16      Vital Signs  Temp: 98.3  F (36.8  C)  Temp src: Oral  Resp: 20  Pulse: 74  Pulse Rate Source: Monitor  BP: (!) " 152/69  BP Location: Left arm    Temp (24hrs), Av.2  F (36.8  C), Min:98.1  F (36.7  C), Max:98.3  F (36.8  C)      GEN: No acute distress.    RESPIRATORY:  Normal breathing pattern. Clear to auscultation bilaterally  CARDIOVASCULAR:  Regular rate and rhythm. No murmur, click, gallop or rub.   ABDOMEN:  Soft, normal bowel sounds, non-tender, RLQ drain with purulent fluid  EXTREMITIES: No edema.  SKIN/HAIR/NAILS:  No rashes  IV: peripheral IV        Antibiotics:  Pip-tazo IV -  micafungin -    Pertinent labs:  No results found for: CRP   CBC RESULTS:   Recent Labs   Lab Test 21  0458   WBC 10.5   RBC 3.86   HGB 9.6*   HCT 29.8*   MCV 77*   MCH 24.9*   MCHC 32.2   RDW 15.3*   *      Last Comprehensive Metabolic Panel:  Sodium   Date Value Ref Range Status   2021 130 (L) 136 - 145 mmol/L Final     Potassium   Date Value Ref Range Status   2021 3.9 3.5 - 5.0 mmol/L Final     Chloride   Date Value Ref Range Status   2021 99 98 - 107 mmol/L Final     Carbon Dioxide (CO2)   Date Value Ref Range Status   2021 24 22 - 31 mmol/L Final     Anion Gap   Date Value Ref Range Status   2021 7 5 - 18 mmol/L Final     Glucose   Date Value Ref Range Status   2021 99 70 - 125 mg/dL Final     Urea Nitrogen   Date Value Ref Range Status   2021 7 (L) 8 - 28 mg/dL Final     Creatinine   Date Value Ref Range Status   2021 0.50 (L) 0.60 - 1.10 mg/dL Final     GFR Estimate   Date Value Ref Range Status   2021 85 >60 mL/min/1.73m2 Final     Comment:     As of 2021, eGFR is calculated by the CKD-EPI creatinine equation, without race adjustment. eGFR can be influenced by muscle mass, exercise, and diet. The reported eGFR is an estimation only and is only applicable if the renal function is stable.     Calcium   Date Value Ref Range Status   2021 7.9 (L) 8.5 - 10.5 mg/dL Final        MICROBIOLOGY DATA:  Personally reviewed.     pelvic abscess culture  proteus, clostridium ramosum, b frag  8/11 abdominal abscess: e coli, pseudomonas, candida    Escherichia coli Pseudomonas aeruginosa       JIMMY JIMMY     Ampicillin <=4 ug/mL Susceptible       Ampicillin/ Sulbactam 4/2 ug/mL Susceptible       Aztreonam   4 ug/mL Susceptible     Cefazolin 2 ug/mL Susceptible       Cefepime <=1 ug/mL Susceptible 2 ug/mL Susceptible     Ceftazidime   <=2 ug/mL Susceptible     Ceftriaxone <=1 ug/mL Susceptible       Ciprofloxacin <=0.25 ug/mL Susceptible <=0.25 ug/mL Susceptible     Gentamicin <=2 ug/mL Susceptible <=2 ug/mL Susceptible     Levofloxacin <=0.5 ug/mL Susceptible <=0.5 ug/mL Susceptible     Meropenem <=0.5 ug/mL Susceptible <=0.5 ug/mL Susceptible     Piperacillin/Tazobactam <=2/4 ug/mL Susceptible 4/4 ug/mL Susceptible     Tetracycline <=2 ug/mL Susceptible       Tobramycin <=2 ug/mL Susceptible <=2 ug/mL Susceptible     Trimethoprim/Sulfa <=0.5 ug/mL Susceptible                 Proteus mirabilis       JIMMY     Ampicillin <=4 ug/mL Susceptible     Ampicillin/ Sulbactam <=1/0.5 ug/mL Susceptible     Cefepime <=1 ug/mL Susceptible     Ceftriaxone <=1 ug/mL Susceptible     Ciprofloxacin <=0.25 ug/mL Susceptible     Gentamicin <=2 ug/mL Susceptible     Levofloxacin <=0.5 ug/mL Susceptible     Piperacillin/Tazobactam <=2/4 ug/mL Susceptible     Tetracycline >8 ug/mL Resistant     Tobramycin <=2 ug/mL Susceptible     Trimethoprim/Sulfa <=0.5 ug/mL Susceptible          RADIOLOGY:  Personally Reviewed.  Recent Results (from the past 24 hour(s))   CT Abdomen Pelvis w Contrast    Narrative    EXAM: CT ABDOMEN PELVIS W CONTRAST  LOCATION: [facility]  DATE/TIME: 8/16/2021 9:35 AM    INDICATION: Drained uterine abscess. More recent drain placement for intraperitoneal free fluid. Follow-up.   COMPARISON: Procedure CT dated 08/11/2021. CT abdomen pelvis dated 08/10/2021.   TECHNIQUE: CT scan of the abdomen and pelvis was performed following injection of IV  contrast. Multiplanar reformats were obtained. Dose reduction techniques were used.  CONTRAST: ISOVUE 370 100 mL    FINDINGS:   LOWER CHEST: Small pleural effusions with atelectasis. Coronary artery disease.     HEPATOBILIARY: Small circumscribed hypoattenuating hepatic lesions have not changed. Status post cholecystectomy.     PANCREAS: Normal.    SPLEEN: Normal.    ADRENAL GLANDS: Normal.    KIDNEYS/BLADDER: Simple renal cysts do not require follow-up. A 1.6 x 1.2 cm lesion at the posterior middle third of the right kidney is 30 Hounsfield units.  There is mild right hydronephrosis and hydroureter, presumably related to the right pelvic   process. The urinary bladder is distended.    BOWEL: Normal stomach. Multiple small bowel loops are distended with a maximum diameter of 4.3 cm. Air-fluid levels are present. No definite transition point identified. The colon is normal in appearance.  No pneumatosis intestinalis.    LYMPH NODES: Multiple prominent and enlarged mesenteric lymph nodes are present.     VASCULATURE: Atherosclerotic disease. 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  No portal venous gas.    PELVIC ORGANS: The uterine catheter has been removed. There is a 4.2 x 3.1 x 1.8 cm collection with air locules and peripheral enhancement in this region (series 3 image 170).     OTHER: There is free fluid with peripheral enhancement and internal air locules in the right pelvis. A pigtail catheter terminates within this fluid; the fluid has decreased in volume from the comparison study.    MUSCULOSKELETAL: Degenerative change in the spine.       Impression    IMPRESSION:   1.  The uterine drain is no longer present. There is a 4 cm fluid collection in this region which contains up to 10 mL of fluid; aspiration or new drain placement should be considered.  2.  Free fluid in the pelvis has decreased though has not yet completely resolved with placement of a  pigtail catheter.  3.  Small bowel distention has increased, and a small bowel obstruction is suspected. Paralytic ileus could have a similar appearance. Recommend further evaluation.    NOTE: ABNORMAL REPORT    THE DICTATION ABOVE DESCRIBES AN ABNORMALITY FOR WHICH FOLLOW-UP IS NEEDED.        Principal Problem:    Acute cystitis without hematuria  Active Problems:    Essential hypertension    Hyperlipidemia    Chills    Chronic hyponatremia    Atrial fibrillation with rapid ventricular response (H)    Abdominal pain, generalized    Perforated sigmoid colon (H)    Pelvic abscess in female    Sepsis without acute organ dysfunction (H)

## 2021-08-19 NOTE — PLAN OF CARE
Problem: Adult Inpatient Plan of Care  Goal: Readiness for Transition of Care  Outcome: Improving     Problem: Pain Acute  Goal: Acceptable Pain Control and Functional Ability  Outcome: Improving     Patient denied having any pain on shift. She was able to sleep comfortably during the night with a purewick in place. Calls appropriately. Has not had anymore loose stools during the night. Is able to reposition self in bed. Saline locked in between IV antibiotics. Received IV Zosyn on shift.

## 2021-08-19 NOTE — PLAN OF CARE
Occupational Therapy Discharge Summary    Reason for therapy discharge:    Discharged to transitional care facility.    Progress towards therapy goal(s). See goals on Care Plan in [facility] electronic health record for goal details.  Goals not met.  Barriers to achieving goals:   discharge from facility and weakness.    Therapy recommendation(s):    Continued therapy is recommended.  Rationale/Recommendations:  Pt will benefit from OT at TCU for ADLs.

## 2021-08-19 NOTE — PLAN OF CARE
Physical Therapy Discharge Summary    Reason for therapy discharge:    Discharged to transitional care facility.    Progress towards therapy goal(s). See goals on Care Plan in [facility] electronic health record for goal details.  Goals not met.  Barriers to achieving goals:   discharge from facility and weakness.    Therapy recommendation(s):    Continued therapy is recommended.  Rationale/Recommendations:  TCU.

## 2021-08-19 NOTE — PLAN OF CARE
Patient alert and oriented. VSS. Tolerating diet but has poor appetite. Encouraged oral intake. Patient has multiple loose BM this shift. Provided with frequent dariusz cares. Assisted with repositioning and shifting weight in bed. Denies any pain thus far. IV antibiotics administered as ordered. GUANAKO drain thick tannish drainage noted.   Problem: Adult Inpatient Plan of Care  Goal: Plan of Care Review  Outcome: Improving     Problem: Adult Inpatient Plan of Care  Goal: Absence of Hospital-Acquired Illness or Injury  Intervention: Prevent Skin Injury  Recent Flowsheet Documentation  Taken 8/18/2021 1530 by Prasanna Cabrera RN  Body Position: turned     Problem: Skin Injury Risk Increased  Goal: Skin Health and Integrity  Outcome: Improving  Intervention: Optimize Skin Protection  Recent Flowsheet Documentation  Taken 8/18/2021 1600 by Prasanna Cabrera RN  Pressure Reduction Techniques: frequent weight shift encouraged  Pressure Reduction Devices: positioning supports utilized  Skin Protection: adhesive use limited

## 2021-08-19 NOTE — PLAN OF CARE
Problem: Discharge Planning  Goal: Discharge Planning (Adult, OB, Behavioral, Peds)  Outcome: Adequate for Discharge   Patient is discharging to TCU today. Patient and family have no concerns with discharge at this time. Patient is up with an A1 using transfer belt and walker. Patient does report increased appetite today and is eating more. Patient continues to have some loose stools and is voiding well.

## 2021-08-19 NOTE — DISCHARGE SUMMARY
[facility] MEDICINE  DISCHARGE SUMMARY     Primary Care Physician: Karma Bethea  Admission Date: 8/5/2021   Discharge Provider: Gurvinder Gracia MD Discharge Date: 8/19/2021   Diet:   Active Diet and Nourishment Order   Procedures     Advance Diet as Tolerated     Code Status: No CPR- Do NOT Intubate   Activity: DCACTIVITY: Activity as tolerated  Assistance with ambulation.  Follow PT OT recommendations.  Fall precautions.      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        PROCEDURES ( this hospitalization only)      IR drain placement #1 8/7/2021  IR drain placement #2 8/11/2021    RECOMMENDATIONS TO OUTPATIENT PROVIDER FOR F/U VISIT     1.  Follow-up with primary site physician per standard protocols.  2.  Follow-up with gynecology per their recommendations.  3.  Follow-up outpatient interventional radiology abscessogram within 1 week.  4.  Drain management and wound management per standard protocols.    DISPOSITION     Skilled Nursing Facility    SUMMARY OF HOSPITAL COURSE:      89 year old female admitted on 8/5/2021. She has a history of bilateral ovarian masses s/p laparoscopic removal, found to be carcinoid  tumor in March 2021, CAD, HTN, HLD, and chronic hyponatremia.      1.  Infectious disease.  8/5 admitted with fever and chills with acute cystitis.  Acute onset of ongoing abdominal pain.  CT scan demonstrated acute perforated sigmoid diverticulitis with 2 pelvic abscesses. The largest measures 6.5 x 5.4 cm. Started on zosyn and 8/7 underwent IR abscess drain.  8/9 surgery starting on clear liquids.  8/10 repeat CT indicated need of an additional drain.  8/11-second drain placed without difficulty. General surgery signed off, because they feel this pelvic/abdominal abscess is more likely a GYN issue, suspected perforated uterus.  Initial drain placed on 8/7, came out accidentally 8/13 and IR recommended removal.  ID consulted.   8/16 repeat CT scan abdomen pelvis showed persistent abscess in uterine area ~4 cm, small bowel dilation concern for ileus vs SBO.  Reconsulted surgery, gynecology and ID.  8/16 given Gastrografin for possible small bowel follow-through.  Had an episode of emesis but since then has been having bowel movements and tolerating clear liquid diet.  Advance diet as tolerated recommended by surgery.  Tolerating regular diet.  Minimal drain placement though remain in place.  Outpatient abscessogram recommended.  Further management per gynecology.  Per ID will discharge on Levaquin and Flagyl for 14 days.    2.  Cardiology.  Course was complicated by new-onset atrial fibrillation with RVR.  Cards consulted.  Started on heparin and IV amiodarone.  Have now transitioned to oral amiodarone and apixaban.       3.  Therapy.  PT OT worked with patient recommended TCU at discharge.  Fall precautions.    Discharge Medications with Med changes:     Discharge Medication List as of 8/19/2021  1:28 PM      START taking these medications    Details   acetaminophen (TYLENOL) 325 MG tablet Take 2 tablets (650 mg) by mouth every 4 hours as needed for mild pain or fever, OTC      amiodarone (PACERONE) 200 MG tablet  Take 1 tablet (200 mg) by mouth 2 times daily, Disp-60 tablet, R-0, Local Print      apixaban ANTICOAGULANT (ELIQUIS) 5 MG tablet Take 1 tablet (5 mg) by mouth 2 times daily, Disp-60 tablet, R-0, Local Print      levofloxacin (LEVAQUIN) 500 MG tablet Take 1 tablet (500 mg) by mouth daily for 14 days, Disp-14 tablet, R-0, Local Print      ondansetron (ZOFRAN-ODT) 4 MG ODT tab Take 1 tablet (4 mg) by mouth every 6 hours as needed for nausea or vomiting, No Print Out      polyethylene glycol (MIRALAX) 17 GM/Dose powder Take 17 g by mouth daily, Disp-510 g, OTC      potassium chloride (KAYCIEL) 20 MEQ/15ML (10%) solution Take 15 mLs (20 mEq) by mouth 3 times daily, No Print Out         CONTINUE these medications which have CHANGED    Details   metroNIDAZOLE (FLAGYL) 500 MG tablet Take 1 tablet (500 mg) by mouth 3 times daily for 14 days, Disp-42 tablet, R-0, Local Print         CONTINUE these medications which have NOT CHANGED    Details   aspirin 81 MG EC tablet Take 81 mg by mouth daily , Historical      carvedilol (COREG) 6.25 MG tablet Take 6.25 mg by mouth 2 times daily (with meals) , Historical      diltiazem 300 MG 24 hr capsule Take 300 mg by mouth daily Takes at 1300., Historical      valsartan (DIOVAN) 80 MG tablet Take 80 mg by mouth every evening , Historical               Rationale for medication changes:      Antibiotics for abdominal abscesses.  Medications for atrial fibrillation management.      Consults     PHYSICAL THERAPY ADULT IP CONSULT  OCCUPATIONAL THERAPY ADULT IP CONSULT  CARDIOLOGY GENERAL ADULT IP CONSULT  PHARMACY IP CONSULT  PHARMACY IP CONSULT  SURGERY GENERAL IP CONSULT  GYNECOLOGY IP CONSULT  PHARMACY TO DOSE VANCO  INFECTIOUS DISEASES IP CONSULT  SURGERY GENERAL IP CONSULT  OB GYN IP CONSULT  WOUND OSTOMY CONTINENCE NURSE  IP CONSULT  PHYSICAL THERAPY ADULT IP CONSULT  OCCUPATIONAL THERAPY ADULT IP CONSULT  NUTRITION SERVICES ADULT IP CONSULT      Immunizations given this encounter      Most Recent Immunizations   Administered Date(s) Administered     COVID-19,PF,Pfizer 03/18/2021     FLU 6-35 months 09/21/2012     Influenza (High Dose) 3 valent vaccine 09/07/2016     Influenza Vaccine IM > 6 months Valent IIV4 10/31/2013     Pneumo Conj 13-V (2010&after) 05/22/2015     Pneumococcal 23 valent 10/02/2004     Td (Adult), Adsorbed 12/31/2005     Tdap (Adacel,Boostrix) 03/28/2014           Anticoagulation Information      Recent INR results: No results for input(s): INR in the last 168 hours.  Warfarin doses (if applicable) or name of other anticoagulant: Eliquis 5 mg twice daily      SIGNIFICANT IMAGING FINDINGS     Results for orders placed or performed during the hospital encounter of 08/05/21   XR Abdomen 1 View    Impression    IMPRESSION:     Decompressed stomach. 2 adjacent loops of gas containing small bowel in the left lower quadrant are mildly gas distended and may have mild wall thickening. Other small bowel loops in the central pelvis/right lower quadrant are normal caliber. Normal   colonic gas pattern.  Nothing for obstruction or free air.      Atheromatous calcifications in the left related to splenic artery. No nephrolithiasis.   CT Abdomen Pelvis w Contrast    Impression    IMPRESSION:   1.  Findings indicative of acute perforated sigmoid diverticulitis with 2 pelvic abscesses. The largest measures 6.5 x 5.4 cm and may be amenable to percutaneous drainage.  2.  Additionally, there is small volume ascites with peritoneal enhancement indicative of peritonitis. Small volume peritoneum.  3.  Manifestations of third spacing  4.  Small bilateral effusions and associated atelectasis. There are patchy bibasilar opacities as well which may indicate superimposed infection.    Findings were discussed with Akua Ventura the charge nurse at 1520 hours.    CT Abd Peritoneum Abscess Drain w Cath Place    Impression    IMPRESSION:  1.  Successful CT-guided drain placement into presumed uterine  abscess. This 12 [place] drain was modified with additional sideholes which was positioned within the loculated fluid in the pelvis to provide drainage of both fluid collections.    Reference CPT Codes: 77583, 02279, 25841   CT Abdomen Pelvis w Contrast    Impression    IMPRESSION:   1.  Intraperitoneal free fluid with peripheral enhancement and internal air locules has increased in volume from the prior study. Additional drain placement should be considered.  2.  The previously drained uterine collection is no longer present.   CT Abdomen Peritoneum Abscess Aspiration    Impression    IMPRESSION:  1.  CT-guided drain placement into right lower quadrant abdominal abscess.    Reference CPT Codes: 99887, 56703   CT Abdomen Pelvis w Contrast    Impression    IMPRESSION:   1.  The uterine drain is no longer present. There is a 4 cm fluid collection in this region which contains up to 10 mL of fluid; aspiration or new drain placement should be considered.  2.  Free fluid in the pelvis has decreased though has not yet completely resolved with placement of a pigtail catheter.  3.  Small bowel distention has increased, and a small bowel obstruction is suspected. Paralytic ileus could have a similar appearance. Recommend further evaluation.    NOTE: ABNORMAL REPORT    THE DICTATION ABOVE DESCRIBES AN ABNORMALITY FOR WHICH FOLLOW-UP IS NEEDED.    XR Gastrografin Challenge    Impression    IMPRESSION: Gastrografin contrast is seen in the colon with markedly dilated distended loops of small bowel suggesting partial obstruction. Pigtail catheter projects over the right hemipelvis.        SIGNIFICANT LABORATORY FINDINGS     Most Recent 3 CBC's:  Recent Labs   Lab Test 08/19/21  0631 08/18/21  0704 08/17/21  0753   WBC 9.5 10.5 12.6*   HGB 9.4* 9.3* 10.3*   MCV 77* 78 77*   * 486* 555*     Most Recent 3 BMP's:  Recent Labs   Lab Test 08/19/21  0631 08/18/21  0704 08/17/21  0753 08/14/21  0939 08/13/21  0526   NA  --   --   130* 130* 130*   POTASSIUM 3.2* 3.9 3.7 3.6 3.7   CHLORIDE  --   --  99 97* 96*   CO2  --   --  24 23 28   BUN  --   --  7* 9 8   CR  --   --  0.50* 0.64 0.52*   ANIONGAP  --   --  7 10 6   ABIGAIL  --   --  7.9* 7.7* 7.7*   GLC  --   --  99 182* 105     Most Recent 2 LFT's:  Recent Labs   Lab Test 08/13/21  0526 08/05/21  1515   AST 24 22   ALT 21 26   ALKPHOS 81 126*   BILITOTAL 0.7 0.6     Most Recent 3 INR's:  Recent Labs   Lab Test 08/11/21  1028   INR 1.58*     Most Recent Cholesterol Panel:No lab results found.  Most Recent TSH and T4:  Recent Labs   Lab Test 08/06/21  1019   TSH 1.41         Discharge Orders        General info for SNF    Length of Stay Estimate: Short Term Care estimated <30 days  Condition at Discharge: Improving  Level of care:skilled   Rehabilitation Potential: Good  Admission H&P remains valid and up-to-date: Yes  Recent Chemotherapy: N/A  Use Nursing Home Standing Orders: Yes     Mantoux instructions    Give two-step Mantoux (PPD) Per Facility Policy Yes     Reason for your hospital stay    Pelvic abscesses     Follow Up and recommended labs and tests    1.  Follow-up with facility physician per protocols.2.  Follow-up with gynecology per their recommendations.3.  Follow-up with interventional radiology outpatient abscessogram within 1 week at [facility].     Activity - Up with nursing assistance     Wound care    Site:   Coccyx  Instructions: Mepilex dressing changes per standard protocols and when soiled     Wound care (specify)    Site:   Abdominal drain site  Instructions: Record volume.  Routine drain management.     No CPR- Do NOT Intubate     Physical Therapy Adult Consult    Evaluate and treat as clinically indicated.    Reason: Significant weakness after prolonged hospitalization     Occupational Therapy Adult Consult    Evaluate and treat as clinically indicated.    Reason: Significant weakness after prolonged hospitalization     Nutrition Services Adult IP Consult     Reason: Severe protein caloric malnutrition     Fall precautions     Advance Diet as Tolerated    Follow this diet upon discharge: Orders Placed This Encounter      Advance Diet as Tolerated: Regular Diet Adult       Examination   Physical Exam   Temp:  [98.1  F (36.7  C)-99.1  F (37.3  C)] 98.1  F (36.7  C)  Pulse:  [78-90] 90  Resp:  [18] 18  BP: (136-139)/(63-64) 139/64  SpO2:  [93 %-94 %] 93 %  Wt Readings from Last 4 Encounters:   08/18/21 57.7 kg (127 lb 1.6 oz)   04/12/21 60.8 kg (134 lb)   03/26/21 61.9 kg (136 lb 7.4 oz)   02/01/21 62.3 kg (137 lb 4.8 oz)       Constitutional: awake, alert, cooperative, no apparent distress, and appears stated age  Eyes: Lids and lashes normal, pupils equal, round and reactive to light, extra ocular muscles intact, sclera clear, conjunctiva normal  ENT: Normocephalic, without obvious abnormality, atraumatic, sinuses nontender on palpation, external ears without lesions, oral pharynx with moist mucous membranes, tonsils without erythema or exudates, gums normal and good dentition.  Respiratory: No increased work of breathing, good air exchange, clear to auscultation bilaterally, no crackles or wheezing  Cardiovascular: Normal apical impulse, regular rate and rhythm, normal S1 and S2, no S3 or S4, and no murmur noted  GI: Positive bowel sounds soft nondistended.  Serous milky drainage noted.  No blood.  No tenderness.  Skin: normal skin color, texture, turgor, no redness, warmth, or swelling, and no rashes  Musculoskeletal: There is no redness, warmth, or swelling of the joints.  Full range of motion noted.  Motor strength is 4 out of 5 all extremities bilaterally.  Tone is normal. no lower extremity pitting edema present  Neurologic: Cranial nerves II-XII are grossly intact. Motor Exam:  Motor exam is symmetrical 5 out of 5 all extremities bilaterally  Sensory:  Sensory intact  Neuropsychiatric: General: normal, calm and normal eye contact Level of consciousness: alert /  normal Affect: normal Orientation: oriented to self, place, time and situation Memory and insight: normal, memory for past and recent events intact and thought process normal      Please see EMR for more detailed significant labs, imaging, consultant notes etc.    IGurvinder MD, personally saw the patient today and spent greater than 30 minutes discharging this patient.    Gurvinder Garcia MD  [facility]    CC:Karma Bethea

## 2021-08-19 NOTE — PROGRESS NOTES
Care Management Discharge Note    Discharge Date: 08/19/2021  Expected Time of Departure: 1430hrs on 8/19 via [facility] w/c transport.    Discharge Disposition: Transitional Care    Discharge Services:  Therapy    Discharge Transportation: agency    Private pay costs discussed: Yes    Patient/family educated on Medicare website which has current facility and service quality ratings: yes (By previous CM.)    Education Provided on the Discharge Plan:  Yes  Persons Notified of Discharge Plans: Pt, daughter Krista, and associated medical staff.  Patient/Family in Agreement with the Plan: yes    Handoff Referral Completed: Yes    Additional Information:  Pt has been accepted to TCU placement at [facility]. This is where pt and family wanted pt to go at discharge.    [facility] wheelchair transport utilized and potential private-pay discussed with family.    PAS had been done by a previous CM.    No further CM needs identified.    Griffin Cerda RN

## 2021-09-02 NOTE — H&P
Interventional Radiology - Pre-Procedure Note:  9/3/2021    Procedure Requested: Abscessogram  Requested by: Loly Juan CNP    History and Physical Reviewed: H&P documented within 30 days (by Carlos Felipe MD  on 8/5/21).  I have personally reviewed the patient's medical history and have updated the medical record as necessary.    Brief HPI: Angelica Toro is a 89 year old female with history of CAD, HTN, HLD, bilateral ovarian masses, recent admission for perforated sigmoid diverticulitis and pelvic abscess s/p CT guided 12 Fr drain placement 8/7/21 into presumed intrauterine abscess (subsequently removed 8/13/21) and s/p CT guided 12 Fr drain into pelvic abscess 8/11/21. Presents today for follow up CT/abscessogram. Patient presents from TCU - no drain output records sent. RN trying to obtain records via telephone. Patient denies issues with drain leaking, pain, fevers, chills    Drain cultures: +E Coli, +Pseudomonas aeruginosa   Drain flushing: 10 mL NS BID  Drain outputs:  5-10 mL per day.      IMAGING:  CT today pending    NPO: MN  ANTICOAGULANTS: Eliquis  ANTIBIOTICS: Flagyl, Levaquin      ALLERGIES  Allergies   Allergen Reactions     Amlodipine Cough, Swelling and Other (See Comments)     PN: swollen tongue, irritated throat     Anastrozole Palpitations     High b/p, headache     Lisinopril Anaphylaxis, Shortness Of Breath and Other (See Comments)     Losartan Swelling     PN: severe leg swelling     Sulfa Drugs Swelling and Anaphylaxis     Throat swelling     Thiazide-Type Diuretics Other (See Comments)     Shakes and chills.  Shakes and chills.     Furosemide Swelling     Hydrochlorothiazide Swelling     Metolazone Other (See Comments)     PN: Trouble breathing and tremors     Morphine GI Disturbance     No Clinical Screening - See Comments Other (See Comments)     PN: decaff coffee         LABS:  INR   Date Value Ref Range Status   08/11/2021 1.58 (H) 0.85 - 1.15 Final     Comment:     Effective  7/11/2021, the reference range for this assay has changed.      Hemoglobin   Date Value Ref Range Status   08/30/2021 10.2 (L) 11.7 - 15.7 g/dL Final   ]  Platelet Count   Date Value Ref Range Status   08/30/2021 391 150 - 450 10e3/uL Final     Creatinine   Date Value Ref Range Status   08/30/2021 0.62 0.60 - 1.10 mg/dL Final     Potassium   Date Value Ref Range Status   08/30/2021 3.9 3.5 - 5.0 mmol/L Final         EXAM:  There were no vitals taken for this visit.  General:  Stable.  In no acute distress.    Neuro:  A&O x 3.   Resp:  Lungs clear to auscultation bilaterally.  Cardio:  S1S2 and reg  Abdomen: Pelvic drain exit site without erythema or drainage to bulb suction with small amount of pale yellow fluid in bulb.     Pre-Sedation Assessment:  Mallampati Airway Classification:  II - Faucial pillars and soft palate may be seen, but uvula is masked by the base of the tongue  Previous reaction to anesthesia/sedation:  No  Sedation plan based on assessment: Moderate (conscious) sedation  ASA Classification: Class 3 - SEVERE SYSTEMIC DISEASE, DEFINITE FUNCTIONAL LIMITATIONS.   Code Status: Full Code intra procedure, per discussion with patient.       ASSESSMENT/PLAN:   Abscessogram with possible drain reposition, exchange or removal with sedation as needed    Procedure, risks/benefits, details, alternatives, and sedation reviewed with patient/family and she verbalized understanding. All questions answered. OK to proceed with above radiology procedure.     Muna Lawrence, CNP  Interventional Radiology

## 2021-09-03 NOTE — DISCHARGE INSTRUCTIONS
Drain/Tube Removal Discharge Instructions:  Please follow the below instructions following your drain/tube removal.    Care Instructions after drain/tube removal:  - OK to shower after removal of your drain/tube.  - Avoid stagnant water such as tub baths, Jacuzzis and pools for 3 days after drain/tube removal.  - Keep previous drain/tube exit site covered with dressing of your preference until drainage stops. Change dressings daily and as needed to keep site dry and clean.  - Expect drainage will stop in approximately 3 days time.    Seek medical evaluation for the following:  - Fever (greater than 101 F (38.3C)).  - Purulent (yellow/green/foul smelling) drainage from previous drain exit site.  - Develop pain at or near the previous drain exit site.  - Persistent drainage lasting longer than three days from previous drain exit site.

## 2021-09-03 NOTE — PRE-PROCEDURE
GENERAL PRE-PROCEDURE:   Procedure:  Abscessogram  Date/Time:  9/3/2021 9:33 AM    Written consent obtained?: Yes    Risks and benefits: Risks, benefits and alternatives were discussed    Consent given by:  Patient  Patient states understanding of procedure being performed: Yes    Patient's understanding of procedure matches consent: Yes    Procedure consent matches procedure scheduled: Yes    Expected level of sedation:  Moderate  Appropriately NPO:  Yes  ASA Class:  3  Mallampati  :  Grade 2- soft palate, base of uvula, tonsillar pillars, and portion of posterior pharyngeal wall visible  Lungs:  Lungs clear with good breath sounds bilaterally  Heart:  Normal heart sounds and rate  History & Physical reviewed:  History and physical reviewed and no updates needed  Statement of review:  I have reviewed the lab findings, diagnostic data, medications, and the plan for sedation

## 2021-09-03 NOTE — IP AVS SNAPSHOT
[facility] Interventional Radiology  [address] [place] [id number]  Phone: [phone]  Fax: [phone]                                    After Visit Summary   9/3/2021    Angelica Toro    MRN: [id number]           After Visit Summary Signature Page    I have received my discharge instructions, and my questions have been answered. I have discussed any challenges I see with this plan with the nurse or doctor.    ..........................................................................................................................................  Patient/Patient Representative Signature      ..........................................................................................................................................  Patient Representative Print Name and Relationship to Patient    ..................................................               ................................................  Date                                   Time    ..........................................................................................................................................  Reviewed by Signature/Title    ...................................................              ..............................................  Date                                               Time          22EPIC Rev 08/18

## 2021-10-12 NOTE — PLAN OF CARE
"  Problem: End-of-Life Care  Goal: Comfort, Peace and Preserved Dignity  Outcome: Improving  Pt arrival via [facility] transport. Pt appears very tired, \"sleeping poorly\",  but able to answer most question. Pt with perforated colon and uterine cancer. Pt GUANAKO drain with minimal drainage, hospital reports very little drainage last several days. Pt recently lost weight because of nausea. Emesis basin at bedside. Limited mobility, bedrest.  Call light appropriate.   "

## 2021-10-13 NOTE — PROGRESS NOTES
Received orders for Bisacodyl supp. Offered to pt, but pt refused today but is open possibly tomorrow.

## 2021-10-13 NOTE — PLAN OF CARE
Problem: End-of-Life Care  Goal: Comfort, Peace and Preserved Dignity  Outcome: No Change   Alert with some forgetfulness.  Able to use call light appropriately.  Voided on bed pan x 1.  Ate fairly well for supper.  Denies pain.  Has deep reddened area in dariusz area.  Nystatin powder applied.  Able to take pills whole. Scant amt drainage from GUANAKO.

## 2021-10-13 NOTE — PLAN OF CARE
"  Problem: End-of-Life Care  Goal: Comfort, Peace and Preserved Dignity  Outcome: No Change     Problem: Infection  Goal: Absence of Infection Signs and Symptoms  Outcome: No Change   Pt alert, flat affect, withdrawn today. When asked about nausea, said \"a little\". Writer encouraged to take Zofran and especially before all her medications. Pt agreed. Pt stated it was effective afterwards but did not want to eat. Encouraged to have cracker or something with am meds as pt takes 4 ATB's. Pt refused but stated she would take her meds. About 5 minutes after swallowing her medications she vomited large amts of  mucous. Assisted pt to wash hands and face. Later pt asked to be turned and repositioned pt to left side. Refused all other cares. Pt has been sleeping and looks very comfortable. Case Manger here and updated on unknown bowel movement. Bowel sounds: right active and left very diminished. Shahrzad will updated MD and call back with plan of care. Also discussed with pt that if she does not want to take all the antibiotics she can refuse and updated CM/MD and see symptom management of discomfort only. Pt nodded her head and said \"ok\". Pt did refuse ATB this afternoon.   "

## 2021-10-13 NOTE — PLAN OF CARE
Problem: End-of-Life Care  Goal: Comfort, Peace and Preserved Dignity  Outcome: Improving   Pt called for bedpan at 0200 and then repositioned. Called at 0500 because nausea, but declined med intervention. Spit into dish, and then stated relief. Repositioned. Joyce Alaniz RN

## 2021-10-14 NOTE — PLAN OF CARE
"  Problem: End-of-Life Care  Goal: Comfort, Peace and Preserved Dignity  Outcome: No Change   Pt alert, drowsy, \"tired\". Pt slightly nauseated in afternoon and Zofran given with good effect. Pt did refuse to take other medications as did \"not want to throw up again\". Pt is very \"gaggy\" and just \"the thought of taking 4-5 pills makes me sick\". Meds held and pt and family supported. Also CM called and updated on pt not wanting medications. Shahrzad AVILEZ is calling pt daughter Krista and will discuss the plan of care. Pt received suppository due to unknown BM. Pt did have a medium soft black stool. GUANAKO put out 20 ml (irritgated with 10cc). Pt open to bed bath today and felt comfortable after this. Buttocks red. Desitin applied. Discussed dickey catheter and pt will think about it.   "

## 2021-10-14 NOTE — PROGRESS NOTES
Pt. Slept.  Family came in.  Requested a bowl of soup and a glass of milk.  Did not void this shift.  No s/s of nausea.

## 2021-10-14 NOTE — PLAN OF CARE
Problem: End-of-Life Care  Goal: Comfort, Peace and Preserved Dignity  Outcome: Declining   Patient is able to make needs known by using call pendant.  Green emesis at 0015 and again at 0630.  Prn ondansetron given both times which was effective at 0015.  Will pass along to next shift to assess effectiveness of most recent dose.  Bedpan x 2 and brief changed for incontinent void when asked to use the bedpan the second time.  GUANAKO drain 10 mls.

## 2021-10-15 NOTE — PLAN OF CARE
Angelica was nauseas early shift -  Had a small emesis - Zofran given for good effect   Able to eat 1/4 cup yogurt  Had broth, but nauseas   Gave Ativan at 8:30 pm for good effect   Angelica refused her pill medications as she says they make her vomit   Did give Aliquis   Left message with [facility] CM to assess meds tomorow   Repositioned easily and back to sleep

## 2021-10-15 NOTE — PLAN OF CARE
Derian had another emesis,med dk gr bile   Gave 0.5mg of Ativan at 1630  Suggested that the next po be a bit lighter than an icecream shake   Derian was able to hold down 1/2 cup of peperment tea  Did have a small emesis after turning to reposition and brief change   J-P drained 10 ml of clear fluid  Gave 0.5mg of Ativan at 1930 for nausea   Derian was able to drink 1/2 cup of peppermint tea and this helped the nausea also   Slept well until 10pm and needed to void - changed blayne and derian reported that she was not nauseated

## 2021-10-15 NOTE — PLAN OF CARE
Problem: End-of-Life Care  Goal: Comfort, Peace and Preserved Dignity  Outcome: No Change   On first rounds pt found to have had a large emesis, dark green with emesis on face, hands, bed ect. Bed bath given, linens changed. Pt refused all pills except Zofran 4 mg. This was given and effective. Pt asked for milk shake later for lunch and tolerated this. RAGHAV Markham visited today and assessed current status. See new orders for increase in Zofran q 8 and discontinue all Antibiotics. Pt turned and repositioned x 3. Coccyx /sacrum is red with mepilex intact. GUANAKO patent. Last BM 10/14.  Dtr's here and visited and aware of all orders/status and in agreement.

## 2021-10-15 NOTE — PLAN OF CARE
Problem: End-of-Life Care  Goal: Comfort, Peace and Preserved Dignity  Outcome: Declining   Patient is able to make needs known by using call pendant.  Small green emesis x 2.  Prn lorazepam 0.5 mg given at 0145 with effective relief.  Void x 2 per bedpan.

## 2021-10-16 NOTE — PLAN OF CARE
Ferrer catheter placed 16 Fr at 6pm for return of dk denice urine 300 ml  No emesis yet   Has had some ice chips  Slept the rest of the shift   Had sips of water and no emesis   10 ml fluid from J-P drain

## 2021-10-16 NOTE — PLAN OF CARE
"  Problem: Nausea and Vomiting  Goal: Fluid and Electrolyte Balance  Outcome: No Change     Problem: End-of-Life Care  Goal: Comfort, Peace and Preserved Dignity  Outcome: Improving    Pt slept well through beginning of shift, c/o some mild nausea with relief from PRN ativan 0.5mg at 0000. Pt woke at 0400 and had a large projectile green emesis at that time, needed total bed change. Given PRN ativan 0.5mg after emesis with what appears to be relief of symptoms, pt has not had another emesis since. No complaints of pain but many complaints of generalized discomfort through the night such as \"something is pinching me\". GUANAKO had scant output. Pt stated she needed to void but when she attempted she was unsuccessful and fell back to sleep.       "

## 2021-10-17 NOTE — PLAN OF CARE
Angelica is alert when spoken to   She cont's to feel nauseated and had 1 mucous spit  Gave PRN  0.5mg of haldol and 0.5mg of ativan at 1715   Repeated 1 hour later for some relief   Gave supp at 1930 - no results   Ferrer cath 125 ml dk concentrated   Angelica continued to be nauseated and had a large green bile emesis at 2200   Gave increased dose of Haldol and Ativan 1mg at 2215   Contacted triage to get increases and atropine drops and scopolamine patches will be delivered tonight

## 2021-10-18 NOTE — PROGRESS NOTES
Pt reports nausea at change of shift. Spitting up thick secretions into tissues. Medicated with prn Ativan and Haldol. Repositioned on right side. Pt sleeping on rounds until 400 when found not breathing. Pronounced dead by writer at 405. No heartbeat x 1 minute. Daughter Krista notified. Awaiting  Home. [facility] Notified. Joyce Alaniz RN

## 2022-12-11 NOTE — ED PROVIDER NOTES
EMERGENCY DEPARTMENT ENCOUNTER      NAME: Angelica Toro  AGE: 89 year old female  YOB: 1929  MRN: [id number]  EVALUATION DATE & TIME: 8/5/2021  5:34 PM    PCP: Loly Sy    ED PROVIDER: Myron Alcantar M.D.      Chief Complaint   Patient presents with     Chills         FINAL IMPRESSION:  1. Acute cystitis without hematuria    2. Chills          ED COURSE & MEDICAL DECISION MAKING:    Pertinent Labs & Imaging studies reviewed. (See chart for details)  89 year old female presents to the Emergency Department for evaluation of generalized weakness, chills. Patient appears non toxic with stable vitals signs, patient is febrile with no tachycardia or hypoxia.  Lungs are clear, abdomen is benign, no flank pain or CVA tenderness, no skin changes or rash, she denies any new cough or shortness of breath or chest pain.  Labs and urine studies obtained from triage concerning for urine studies suggestive of UTI and with her chills concerning for cystitis.  She is not significantly tachycardic, no tachypnea, no hypotension, certainly nothing to suggest septic shock at this time.  Had chest x-ray done at outside clinic which was reviewed and reported no acute concerning findings.  Feel the patient would benefit from admission for antibiotics and continued observation.  Patient was started on ceftriaxone given Tylenol and fluids.  Discussed findings and recommendations for admission with patient and family.  Discussed admission plan with hospitalist.      5:52 PM I met with the patient, obtained history, performed an initial exam, and discussed options and plan for diagnostics and treatment here in the ED.  7:00 PM I spoke to Dr. Felipe, hospitalist.   7:18 PM Repeat exam benign. Discussed findings and admission. Updated patient on boarding.    At the conclusion of the encounter I discussed the results of all of the tests and the disposition. The questions were answered and return precautions provided. The  END OF SHIFT NOTE:    INTAKE/OUTPUT  12/10 0701 - 12/11 0700  In: 2436 [P.O.:1370; I.V.:1066]  Out: 700 [Urine:700]  Voiding: Yes  Catheter: No  Drain:              Flatus: Patient does have flatus present. Stool:  occurrences. Characteristics:  Stool Appearance: Soft  Stool Color: Brown  Stool Amount: Small  Stool Assessment  Incontinence: Yes  Stool Appearance: Soft  Stool Color: Brown  Stool Amount: Small  Stool Source: Rectum  Last BM (including prior to admit): 12/10/22    Emesis:  occurrences. Characteristics:        VITAL SIGNS  Patient Vitals for the past 12 hrs:   Temp Pulse Resp BP SpO2   12/11/22 0346 98 °F (36.7 °C) 99 20 133/67 96 %   12/11/22 0021 97.9 °F (36.6 °C) (!) 103 20 125/67 96 %   12/10/22 2130 98.6 °F (37 °C) -- -- -- --   12/10/22 2016 (!) 102.6 °F (39.2 °C) (!) 112 20 133/69 95 %       Pain Assessment  Pain Level: 0 (12/11/22 0440)  Pain Location: Back  Patient's Stated Pain Goal: 0 - No pain    Ambulating  Yes    Shift report given to oncoming nurse at the bedside.     Grzegorz Shi RN patient or family acknowledged understanding and was agreeable with the care plan.         MEDICATIONS GIVEN IN THE EMERGENCY:  Medications   acetaminophen (TYLENOL) tablet 650 mg (650 mg Oral Not Given 8/5/21 1842)   carvedilol (COREG) tablet 6.25 mg (6.25 mg Oral Given 8/5/21 2008)   aspirin EC tablet 81 mg (81 mg Oral Given 8/5/21 2144)   valsartan (DIOVAN) tablet 80 mg (80 mg Oral Given 8/5/21 2144)   cefTRIAXone (ROCEPHIN) 1 g vial to attach to  mL bag for ADULTS or NS 50 mL bag for PEDS (has no administration in time range)   lidocaine 1 % 0.1-1 mL (has no administration in time range)   lidocaine (LMX4) cream (has no administration in time range)   sodium chloride (PF) 0.9% PF flush 3 mL (has no administration in time range)   sodium chloride (PF) 0.9% PF flush 3 mL (has no administration in time range)   melatonin tablet 1 mg (has no administration in time range)   ondansetron (ZOFRAN-ODT) ODT tab 4 mg (has no administration in time range)     Or   ondansetron (ZOFRAN) injection 4 mg (has no administration in time range)   magnesium hydroxide (MILK OF MAGNESIA) suspension 30 mL (has no administration in time range)   polyethylene glycol (MIRALAX) Packet 17 g (has no administration in time range)   diltiazem ER COATED BEADS (CARDIZEM CD/CARTIA XT) 24 hr capsule 180 mg (has no administration in time range)     And   diltiazem ER COATED BEADS (CARDIZEM CD/CARTIA XT) 24 hr capsule 120 mg (has no administration in time range)   acetaminophen (TYLENOL) tablet 650 mg (650 mg Oral Given 8/5/21 1535)   cefTRIAXone (ROCEPHIN) 1 g vial to attach to  mL bag for ADULTS or NS 50 mL bag for PEDS (0 g Intravenous Stopped 8/5/21 1927)   0.9% sodium chloride BOLUS (500 mLs Intravenous New Bag 8/5/21 1856)       NEW PRESCRIPTIONS STARTED AT TODAY'S ER VISIT  Current Discharge Medication List               =================================================================    HPI    Patient information was obtained  from: Yael    Use of Intrepreter: N/A       Angelica Toro is a 89 year old female with a history of hypokalemia, hyponatremia, anemia, CAD, HTN, hyperlipidemia, and dyslipidemia, who presents to the ED via walk-in for the evaluation of chills.     Patient reports intermittent chills since 7/14/21. Patient was seen at [facility] earlier today where they found low sodium and electrolyte levels. Patient denies any new pain, chest pain, cough, urinary symptoms, fever, rashes, blood in stool. Patient has not taken blood pressure medication today. Patient has no history of tobacco or alcohol use.     Per daughter, patient is currently on antibiotics for a bladder infection and started medication a week ago. Patient has gradually gotten weaker, had a loss of appetite, and has been experiencing chills.     No other complaints at this time.     Per chart review, [place] [facility] [place] and cough where a XR Chest was performed.      RADIOLOGY - 08/05/2021 12:16 PM CDT    EXAM: XR CHEST 2 VIEWS  LOCATION: [facility]  DATE/TIME: 8/5/2021 12:11 PM    INDICATION: Cough, low grade temp, weakness  COMPARISON: 03/10/2021.    IMPRESSION: Lungs are hyperinflated. No focal consolidation or effusion. Heart size is normal. Atherosclerosis. Degenerative changes of the spine.        REVIEW OF SYSTEMS   Constitutional:  Denies fever. Positive for chills and loss of appetite.   Respiratory:  Denies productive cough or increased work of breathing  Cardiovascular:  Denies chest pain, palpitations  GI:  Denies abdominal pain, nausea, vomiting, or change in bowel or bladder habits   Musculoskeletal:  Denies any new muscle/joint swelling  Skin:  Denies rash   Neurologic:  Denies focal weakness. Positive for generalized weakness.  All systems negative except as marked.     PAST MEDICAL HISTORY:  Past Medical History:   Diagnosis Date     Breast cancer (H)     left, s/p  lumpectomy     CAD (coronary artery disease)     MI 2010 s/p stent     Diastolic dysfunction      Diverticulosis      HLD (hyperlipidemia)      HTN (hypertension)      Hx SBO      Right ovarian cyst      STEMI (ST elevation myocardial infarction) (H) 10/28/2010    inferior     Urinary incontinence        PAST SURGICAL HISTORY:  Past Surgical History:   Procedure Laterality Date     ANGIOPLASTY  2010     ANKLE FRACTURE SURGERY Right 1995     APPENDECTOMY       APPENDECTOMY  1975     AS CORNEAL TRANSPLANT,LAMELLAR       CATARACT EXTRACTION Bilateral 2007     CHOLECYSTECTOMY       CHOLECYSTECTOMY  1975     CORNEAL TRANSPLANT Bilateral 2011     FRACTURE TX, ANKLE RT/LT       LAPAROSCOPIC SALPINGO-OOPHORECTOMY Bilateral 3/26/2021    Procedure: Laparoscopic BSO. Cystoscopy;  Surgeon: Juanjose Camejo MD;  Location: [facility] OR     LIGATE VEIN       LUMPECTOMY BREAST Left      LUMPECTOMY BREAST Left 03/10/2017     DC PATIENT HAS A CORONARY ARTERY STENT  2010     US BREAST BIOPSY LT       VEIN SURGERY Right 1980         CURRENT MEDICATIONS:    Prior to Admission medications    Medication Sig Start Date End Date Taking? Authorizing Provider   aspirin 81 MG EC tablet Take 81 mg by mouth 1/8/15   Reported, Patient   carvedilol (COREG) 6.25 MG tablet TAKE 1 TABLET BY MOUTH TWICE DAILY WITH MEALS 12/14/20   Reported, Patient   diltiazem 300 MG 24 hr capsule Take 300 mg by mouth 3/13/18   Reported, Patient   Ferrous Gluconate 225 (27 Fe) MG TABS 225 mg    Reported, Patient   Multiple Vitamin (MULTI-VITAMINS) TABS Take 1 tablet by mouth    Reported, Patient   olmesartan (BENICAR) 20 MG tablet Take 20 mg by mouth 8/10/18 8/10/19  Reported, Patient   pravastatin (PRAVACHOL) 20 MG tablet Take 20 mg by mouth 3/13/18   Reported, Patient   valsartan (DIOVAN) 80 MG tablet Take 80 mg by mouth daily     Reported, Patient        ALLERGIES:  Allergies   Allergen Reactions     Amlodipine Cough, Swelling and Other (See Comments)     PN:  swollen tongue, irritated throat     Anastrozole Palpitations     High b/p, headache     Lisinopril Anaphylaxis, Shortness Of Breath and Other (See Comments)     Losartan Swelling     PN: severe leg swelling     Sulfa Drugs Swelling and Anaphylaxis     Throat swelling     Thiazide-Type Diuretics Other (See Comments)     Shakes and chills.  Shakes and chills.     Coffee Bean Extract  [Coffea Arabica]      Other reaction(s): Other (See Comments)  PN: decaff coffee     Furosemide Swelling     Hydrochlorothiazide Swelling     Metolazone Other (See Comments)     PN: Trouble breathing and tremors     Morphine GI Disturbance     No Clinical Screening - See Comments Other (See Comments)     PN: decaff coffee       FAMILY HISTORY:  Family History   Problem Relation Age of Onset     Breast Cancer Daughter 50.00     Breast Cancer Niece      Hypertension Mother      Uterine Cancer Mother      Prostate Cancer Father      Cancer Sister         small intestinal cancer.     No Known Problems Son         passed away in an accident.     No Known Problems Son      Heart Disease Daughter      Hyperlipidemia Daughter      Sjogren's Daughter      Hyperlipidemia Daughter        SOCIAL HISTORY:   Social History     Socioeconomic History     Marital status: Single     Spouse name: Not on file     Number of children: Not on file     Years of education: Not on file     Highest education level: Not on file   Occupational History     Not on file   Tobacco Use     Smoking status: Never Smoker     Smokeless tobacco: Never Used   Substance and Sexual Activity     Alcohol use: Yes     Alcohol/week: 1.0 standard drinks     Types: 1 Cans of beer per week     Comment: 3 times a year     Drug use: No     Sexual activity: Not Currently   Other Topics Concern     Not on file   Social History Narrative     Not on file     Social Determinants of Health     Financial Resource Strain:      Difficulty of Paying Living Expenses:    Food Insecurity:       "Worried About Running Out of Food in the Last Year:      Ran Out of Food in the Last Year:    Transportation Needs:      Lack of Transportation (Medical):      Lack of Transportation (Non-Medical):    Physical Activity:      Days of Exercise per Week:      Minutes of Exercise per Session:    Stress:      Feeling of Stress :    Social Connections:      Frequency of Communication with Friends and Family:      Frequency of Social Gatherings with Friends and Family:      Attends [employer] Services:      Active Member of Clubs or Organizations:      Attends Club or Organization Meetings:      Marital Status:    Intimate Partner Violence:      Fear of Current or Ex-Partner:      Emotionally Abused:      Physically Abused:      Sexually Abused:        VITALS:  Patient Vitals for the past 24 hrs:   BP Temp Temp src Pulse Resp SpO2 Height Weight   08/05/21 2144 (!) 163/70 98  F (36.7  C) Oral 85 -- -- -- --   08/05/21 2028 (!) 140/66 -- -- -- -- -- -- --   08/05/21 2024 (!) 144/67 97.8  F (36.6  C) Oral 87 18 95 % 1.575 m (5' 2\") 57.6 kg (127 lb 1 oz)   08/05/21 2013 126/60 -- -- -- -- -- -- --   08/05/21 2008 126/60 -- -- 78 -- -- -- --   08/05/21 1900 137/65 98.9  F (37.2  C) Oral 80 18 97 % -- --   08/05/21 1526 (!) 157/92 (!) 101.2  F (38.4  C) -- 88 16 92 % -- --   08/05/21 1321 (!) 163/69 98.9  F (37.2  C) Oral 88 16 98 % 1.575 m (5' 2\") 58.5 kg (129 lb)        PHYSICAL EXAM    Constitutional:  Awake, alert, in no apparent distress  HENT:  Normocephalic, Atraumatic. Bilateral external ears normal. Oropharynx moist. Nose normal. Neck- Normal range of motion with no guarding, No midline cervical tenderness, Supple, No stridor.   Eyes:  PERRL, EOMI with no signs of entrapment, Conjunctiva normal, No discharge.   Respiratory:  Normal breath sounds, No respiratory distress, No wheezing.    Cardiovascular:  Normal heart rate, Normal rhythm, No appreciable rubs or gallops.   GI:  Soft, No tenderness, No distension, No " palpable masses.  No CVA tenderness  Musculoskeletal:  Intact distal pulses, No pitting edema. Good range of motion in all major joints. No tenderness to palpation or major deformities noted.  Integument:  Warm, Dry, No erythema, No rash.   Neurologic:  Alert & oriented, Normal motor function, Normal sensory function, No focal deficits noted.   Psychiatric:  Affect normal, Judgment normal, Mood normal.    LAB:  All pertinent labs reviewed and interpreted.  Results for orders placed or performed during the hospital encounter of 08/05/21   Comprehensive metabolic panel   Result Value Ref Range    Sodium 124 (L) 136 - 145 mmol/L    Potassium 3.8 3.5 - 5.0 mmol/L    Chloride 91 (L) 98 - 107 mmol/L    Carbon Dioxide (CO2) 25 22 - 31 mmol/L    Anion Gap 8 5 - 18 mmol/L    Urea Nitrogen 12 8 - 28 mg/dL    Creatinine 0.59 (L) 0.60 - 1.10 mg/dL    Calcium 8.6 8.5 - 10.5 mg/dL    Glucose 110 70 - 125 mg/dL    Alkaline Phosphatase 126 (H) 45 - 120 U/L    AST 22 0 - 40 U/L    ALT 26 0 - 45 U/L    Protein Total 6.9 6.0 - 8.0 g/dL    Albumin 2.4 (L) 3.5 - 5.0 g/dL    Bilirubin Total 0.6 0.0 - 1.0 mg/dL    GFR Estimate 80 >60 mL/min/1.73m2   UA with Microscopic reflex to Culture    Specimen: Urine, Midstream   Result Value Ref Range    Color Urine Yellow Colorless, Straw, Light Yellow, Yellow    Appearance Urine Cloudy (A) Clear    Glucose Urine Negative Negative mg/dL    Bilirubin Urine Negative Negative    Ketones Urine Negative Negative mg/dL    Specific Gravity Urine 1.015 1.001 - 1.030    Blood Urine 0.5 mg/dL (A) Negative    pH Urine 6.5 5.0 - 7.0    Protein Albumin Urine 10  (A) Negative mg/dL    Urobilinogen Urine 2.0 (A) <2.0 mg/dL    Nitrite Urine Positive (A) Negative    Leukocyte Esterase Urine 250 [place]/uL (A) Negative    Bacteria Urine Few (A) None Seen /HPF    Mucus Urine Present (A) None Seen /LPF    RBC Urine 10 (H) <=2 /HPF    WBC Urine 25 (H) <=5 /HPF    Squamous Epithelials Urine 1 <=1 /HPF   CBC with platelets  and differential   Result Value Ref Range    WBC Count 9.8 4.0 - 11.0 10e3/uL    RBC Count 4.20 3.80 - 5.20 10e6/uL    Hemoglobin 10.6 (L) 11.7 - 15.7 g/dL    Hematocrit 32.5 (L) 35.0 - 47.0 %    MCV 77 (L) 78 - 100 fL    MCH 25.2 (L) 26.5 - 33.0 pg    MCHC 32.6 31.5 - 36.5 g/dL    RDW 13.5 10.0 - 15.0 %    Platelet Count 503 (H) 150 - 450 10e3/uL    % Neutrophils 80 %    % Lymphocytes 10 %    % Monocytes 9 %    % Eosinophils 0 %    % Basophils 0 %    % Immature Granulocytes 1 %    NRBCs per 100 WBC 0 <1 /100    Absolute Neutrophils 7.8 1.6 - 8.3 10e3/uL    Absolute Lymphocytes 1.0 0.8 - 5.3 10e3/uL    Absolute Monocytes 0.9 0.0 - 1.3 10e3/uL    Absolute Eosinophils 0.0 0.0 - 0.7 10e3/uL    Absolute Basophils 0.0 0.0 - 0.2 10e3/uL    Absolute Immature Granulocytes 0.1 (H) <=0.0 10e3/uL    Absolute NRBCs 0.0 10e3/uL   SARS-COV2 (COVID-19) Virus RT-PCR    Specimen: Nasopharyngeal; Swab   Result Value Ref Range    SARS CoV2 PCR Negative Negative       RADIOLOGY:  No orders to display          EKG:      I have independently reviewed and interpreted the EKG(s) documented above.    PROCEDURES:       I, Jessica Tran, am serving as a scribe to document services personally performed by Myron Alcantar MD, based on my observation and the provider's statements to me. I, Myron Alcantar MD attest that Jessica Tran is acting in a scribe capacity, has observed my performance of the services and has documented them in accordance with my direction.    Myron Alcantar M.D.  Emergency Medicine  [facility] EMERGENCY ROOM  [address] [facility] [id number] [phone]  Dept: [phone]     Myron Alcantar MD  08/05/21 4313

## (undated) DEVICE — CONNECTOR WATER VALVE PERFUSION PACK STR 020272801

## (undated) DEVICE — ENDO POUCH UNIVERSAL RETRIEVAL SYSTEM INZII 12/15MM CD004

## (undated) DEVICE — LINEN TOWEL PACK X30 5481

## (undated) DEVICE — DRSG TEGADERM 2 3/8X2 3/4" 1624W

## (undated) DEVICE — PREP POVIDONE IODINE SOLUTION 10% 4OZ

## (undated) DEVICE — NDL COUNTER 20CT 31142493

## (undated) DEVICE — ENDO POUCH UNIV RETRIEVAL SYSTEM INZII 10MM CD001

## (undated) DEVICE — ENDO SCOPE WARMER SEAL  C3101

## (undated) DEVICE — PREP CHLORAPREP 26ML TINTED ORANGE  260815

## (undated) DEVICE — ESU ENDO SCISSORS 5MM CVD 5DCS

## (undated) DEVICE — SU VICRYL 2-0 CT-2 27" UND J269H

## (undated) DEVICE — SOL NACL 0.9% IRRIG 1000ML BOTTLE 2F7124

## (undated) DEVICE — NDL ANGIOCATH 16GA 2" 4082

## (undated) DEVICE — ENDO TROCAR SLEEVE KII Z-THREADED 05X100MM CTS02

## (undated) DEVICE — NDL SPINAL 22GA 3.5" QUINCKE 405181

## (undated) DEVICE — ESU GROUND PAD ADULT W/CORD E7507

## (undated) DEVICE — SURGICEL HEMOSTAT 4X8" 1952

## (undated) DEVICE — SUCTION MANIFOLD NEPTUNE 2 SYS 4 PORT 0702-020-000

## (undated) DEVICE — CATH TRAY FOLEY 16FR W/URINE METER STATLOCK 303316A

## (undated) DEVICE — Device

## (undated) DEVICE — SUCTION IRR STRYKERFLOW II W/TIP 250-070-520

## (undated) DEVICE — GLOVE LINER HALF FINGER NYLON KP5015/50

## (undated) DEVICE — KIT PATIENT POSITIONING PIGAZZI LATEX FREE 40580

## (undated) DEVICE — SOL WATER IRRIG 1000ML BOTTLE 2F7114

## (undated) DEVICE — ENDO TROCAR FIRST ENTRY KII FIOS Z-THRD 12X100MM CTF73

## (undated) DEVICE — TUBING IRRIG CYSTO/BLADDER SET 81" LF 2C4040

## (undated) DEVICE — ENDO TROCAR FIRST ENTRY KII FIOS Z-THRD 05X100MM CTF03

## (undated) DEVICE — NDL INSUFFLATION 13GA 120MM C2201

## (undated) DEVICE — SOL ADH LIQUID BENZOIN SWAB 0.6ML C1544

## (undated) DEVICE — SU MONOCRYL 4-0 PS-2 27" UND Y426H

## (undated) DEVICE — LINEN TOWEL PACK X6 WHITE 5487

## (undated) DEVICE — SU VICRYL 0 TIE 54" J608H

## (undated) DEVICE — DRSG GAUZE 2X2" 8042

## (undated) DEVICE — GLOVE GAMMEX NEOPRENE ULTRA SZ 7.5 LF 8515

## (undated) DEVICE — ESU LIGASURE LAPAROSCOPIC BLUNT TIP SEALER 5MMX37CM LF1837

## (undated) RX ORDER — FENTANYL CITRATE 50 UG/ML
INJECTION, SOLUTION INTRAMUSCULAR; INTRAVENOUS
Status: DISPENSED
Start: 2021-01-01

## (undated) RX ORDER — BUPIVACAINE HYDROCHLORIDE 2.5 MG/ML
INJECTION, SOLUTION EPIDURAL; INFILTRATION; INTRACAUDAL
Status: DISPENSED
Start: 2021-01-01

## (undated) RX ORDER — NALOXONE HYDROCHLORIDE 1 MG/ML
INJECTION INTRAMUSCULAR; INTRAVENOUS; SUBCUTANEOUS
Status: DISPENSED
Start: 2021-01-01

## (undated) RX ORDER — FLUMAZENIL 0.1 MG/ML
INJECTION, SOLUTION INTRAVENOUS
Status: DISPENSED
Start: 2021-01-01

## (undated) RX ORDER — CEFAZOLIN SODIUM 2 G/100ML
INJECTION, SOLUTION INTRAVENOUS
Status: DISPENSED
Start: 2021-01-01

## (undated) RX ORDER — SODIUM CHLORIDE, SODIUM LACTATE, POTASSIUM CHLORIDE, CALCIUM CHLORIDE 600; 310; 30; 20 MG/100ML; MG/100ML; MG/100ML; MG/100ML
INJECTION, SOLUTION INTRAVENOUS
Status: DISPENSED
Start: 2021-01-01